# Patient Record
Sex: FEMALE | Race: WHITE | Employment: OTHER | URBAN - METROPOLITAN AREA
[De-identification: names, ages, dates, MRNs, and addresses within clinical notes are randomized per-mention and may not be internally consistent; named-entity substitution may affect disease eponyms.]

---

## 2017-01-10 ENCOUNTER — OFFICE VISIT (OUTPATIENT)
Dept: INTERNAL MEDICINE CLINIC | Age: 57
End: 2017-01-10

## 2017-01-10 VITALS
HEART RATE: 72 BPM | TEMPERATURE: 97.9 F | SYSTOLIC BLOOD PRESSURE: 132 MMHG | WEIGHT: 168 LBS | OXYGEN SATURATION: 98 % | BODY MASS INDEX: 25.46 KG/M2 | HEIGHT: 68 IN | DIASTOLIC BLOOD PRESSURE: 82 MMHG

## 2017-01-10 DIAGNOSIS — I10 ESSENTIAL HYPERTENSION, BENIGN: ICD-10-CM

## 2017-01-10 DIAGNOSIS — J02.9 PHARYNGITIS, UNSPECIFIED ETIOLOGY: Primary | ICD-10-CM

## 2017-01-10 DIAGNOSIS — J01.10 ACUTE NON-RECURRENT FRONTAL SINUSITIS: ICD-10-CM

## 2017-01-10 PROCEDURE — 87880 STREP A ASSAY W/OPTIC: CPT | Performed by: NURSE PRACTITIONER

## 2017-01-10 PROCEDURE — 99214 OFFICE O/P EST MOD 30 MIN: CPT | Performed by: NURSE PRACTITIONER

## 2017-01-10 RX ORDER — ESCITALOPRAM OXALATE 10 MG/1
10 TABLET ORAL DAILY
COMMUNITY
End: 2017-09-22

## 2017-01-10 RX ORDER — AMOXICILLIN AND CLAVULANATE POTASSIUM 875; 125 MG/1; MG/1
1 TABLET, FILM COATED ORAL 2 TIMES DAILY
Qty: 20 TABLET | Refills: 0 | Status: SHIPPED | OUTPATIENT
Start: 2017-01-10 | End: 2017-01-20

## 2017-01-10 ASSESSMENT — ENCOUNTER SYMPTOMS
SWOLLEN GLANDS: 1
NAUSEA: 1
SORE THROAT: 1
VOMITING: 0
COUGH: 1

## 2017-01-12 ENCOUNTER — OFFICE VISIT (OUTPATIENT)
Dept: PSYCHOLOGY | Age: 57
End: 2017-01-12

## 2017-01-12 DIAGNOSIS — F32.9 MAJOR DEPRESSIVE DISORDER, SINGLE EPISODE WITH ANXIOUS DISTRESS: Primary | ICD-10-CM

## 2017-01-12 DIAGNOSIS — F41.9 ANXIETY: ICD-10-CM

## 2017-01-12 DIAGNOSIS — F51.01 PRIMARY INSOMNIA: ICD-10-CM

## 2017-01-12 PROCEDURE — 90832 PSYTX W PT 30 MINUTES: CPT | Performed by: PSYCHOLOGIST

## 2017-01-12 ASSESSMENT — PATIENT HEALTH QUESTIONNAIRE - PHQ9
7. TROUBLE CONCENTRATING ON THINGS, SUCH AS READING THE NEWSPAPER OR WATCHING TELEVISION: 2
SUM OF ALL RESPONSES TO PHQ9 QUESTIONS 1 & 2: 2
3. TROUBLE FALLING OR STAYING ASLEEP: 3
1. LITTLE INTEREST OR PLEASURE IN DOING THINGS: 1
4. FEELING TIRED OR HAVING LITTLE ENERGY: 1
5. POOR APPETITE OR OVEREATING: 0
8. MOVING OR SPEAKING SO SLOWLY THAT OTHER PEOPLE COULD HAVE NOTICED. OR THE OPPOSITE, BEING SO FIGETY OR RESTLESS THAT YOU HAVE BEEN MOVING AROUND A LOT MORE THAN USUAL: 1
6. FEELING BAD ABOUT YOURSELF - OR THAT YOU ARE A FAILURE OR HAVE LET YOURSELF OR YOUR FAMILY DOWN: 2
SUM OF ALL RESPONSES TO PHQ QUESTIONS 1-9: 11
2. FEELING DOWN, DEPRESSED OR HOPELESS: 1
9. THOUGHTS THAT YOU WOULD BE BETTER OFF DEAD, OR OF HURTING YOURSELF: 0
10. IF YOU CHECKED OFF ANY PROBLEMS, HOW DIFFICULT HAVE THESE PROBLEMS MADE IT FOR YOU TO DO YOUR WORK, TAKE CARE OF THINGS AT HOME, OR GET ALONG WITH OTHER PEOPLE: 2

## 2017-01-13 DIAGNOSIS — F41.9 ANXIETY: ICD-10-CM

## 2017-01-13 DIAGNOSIS — F51.01 PRIMARY INSOMNIA: ICD-10-CM

## 2017-01-13 RX ORDER — ALPRAZOLAM 0.5 MG/1
0.5 TABLET ORAL 3 TIMES DAILY PRN
Qty: 60 TABLET | Refills: 2 | Status: CANCELLED | OUTPATIENT
Start: 2017-01-13

## 2017-01-17 RX ORDER — ALPRAZOLAM 0.5 MG/1
0.5 TABLET ORAL 3 TIMES DAILY PRN
Qty: 60 TABLET | Refills: 2 | Status: SHIPPED | OUTPATIENT
Start: 2017-01-17 | End: 2017-09-22 | Stop reason: SDUPTHER

## 2017-05-30 ENCOUNTER — TELEPHONE (OUTPATIENT)
Dept: FAMILY MEDICINE CLINIC | Age: 57
End: 2017-05-30

## 2017-07-07 RX ORDER — LISINOPRIL 10 MG/1
TABLET ORAL
Qty: 90 TABLET | Refills: 0 | Status: SHIPPED | OUTPATIENT
Start: 2017-07-07 | End: 2017-09-22

## 2017-09-22 ENCOUNTER — OFFICE VISIT (OUTPATIENT)
Dept: FAMILY MEDICINE CLINIC | Age: 57
End: 2017-09-22

## 2017-09-22 VITALS
BODY MASS INDEX: 25.42 KG/M2 | DIASTOLIC BLOOD PRESSURE: 82 MMHG | HEART RATE: 83 BPM | RESPIRATION RATE: 12 BRPM | TEMPERATURE: 97.7 F | SYSTOLIC BLOOD PRESSURE: 132 MMHG | WEIGHT: 167.2 LBS

## 2017-09-22 DIAGNOSIS — H92.02 OTALGIA, LEFT: ICD-10-CM

## 2017-09-22 DIAGNOSIS — F41.9 ANXIETY: ICD-10-CM

## 2017-09-22 DIAGNOSIS — Z12.11 SCREEN FOR COLON CANCER: ICD-10-CM

## 2017-09-22 DIAGNOSIS — F51.01 PRIMARY INSOMNIA: ICD-10-CM

## 2017-09-22 DIAGNOSIS — M26.69 TMJ INFLAMMATION: ICD-10-CM

## 2017-09-22 DIAGNOSIS — M25.562 CHRONIC PAIN OF LEFT KNEE: Primary | ICD-10-CM

## 2017-09-22 DIAGNOSIS — N95.1 HOT FLASHES DUE TO MENOPAUSE: ICD-10-CM

## 2017-09-22 DIAGNOSIS — I10 ESSENTIAL HYPERTENSION, BENIGN: ICD-10-CM

## 2017-09-22 DIAGNOSIS — G89.29 CHRONIC PAIN OF LEFT KNEE: Primary | ICD-10-CM

## 2017-09-22 PROCEDURE — 1036F TOBACCO NON-USER: CPT | Performed by: FAMILY MEDICINE

## 2017-09-22 PROCEDURE — 90471 IMMUNIZATION ADMIN: CPT | Performed by: FAMILY MEDICINE

## 2017-09-22 PROCEDURE — 3017F COLORECTAL CA SCREEN DOC REV: CPT | Performed by: FAMILY MEDICINE

## 2017-09-22 PROCEDURE — 90686 IIV4 VACC NO PRSV 0.5 ML IM: CPT | Performed by: FAMILY MEDICINE

## 2017-09-22 PROCEDURE — 3014F SCREEN MAMMO DOC REV: CPT | Performed by: FAMILY MEDICINE

## 2017-09-22 PROCEDURE — G8427 DOCREV CUR MEDS BY ELIG CLIN: HCPCS | Performed by: FAMILY MEDICINE

## 2017-09-22 PROCEDURE — 99214 OFFICE O/P EST MOD 30 MIN: CPT | Performed by: FAMILY MEDICINE

## 2017-09-22 PROCEDURE — G8419 CALC BMI OUT NRM PARAM NOF/U: HCPCS | Performed by: FAMILY MEDICINE

## 2017-09-22 RX ORDER — NAPROXEN 500 MG/1
500 TABLET ORAL 2 TIMES DAILY WITH MEALS
Qty: 60 TABLET | Refills: 5 | Status: SHIPPED | OUTPATIENT
Start: 2017-09-22 | End: 2018-07-09

## 2017-09-22 RX ORDER — ALPRAZOLAM 0.5 MG/1
0.5 TABLET ORAL 3 TIMES DAILY PRN
Qty: 60 TABLET | Refills: 2 | Status: SHIPPED | OUTPATIENT
Start: 2017-09-22 | End: 2018-07-09 | Stop reason: SDUPTHER

## 2017-11-16 DIAGNOSIS — Z00.00 ROUTINE GENERAL MEDICAL EXAMINATION AT A HEALTH CARE FACILITY: Primary | ICD-10-CM

## 2017-11-16 NOTE — TELEPHONE ENCOUNTER
Pt stated prempro about 4 weeks ago and it doesn't seem to have helped. C/o having itchy legs, nausea, lack of energy,  And didn't help anxiety either. Pt has a friend that uses climara patch and want to know your thoughts about that.

## 2017-11-16 NOTE — TELEPHONE ENCOUNTER
Pt also has appt in dec.  And asking for screening labs and if you think it's a good idea to check any hormone levels, iron studies bc family hx of hemochromatosis, and vit d

## 2017-11-21 NOTE — TELEPHONE ENCOUNTER
We had issues with her insurance and coverage of bloodwork in the past  Please verify what she wants to have done so we can order correctly  thanks

## 2017-12-19 ENCOUNTER — OFFICE VISIT (OUTPATIENT)
Dept: FAMILY MEDICINE CLINIC | Age: 57
End: 2017-12-19

## 2017-12-19 VITALS
DIASTOLIC BLOOD PRESSURE: 84 MMHG | HEIGHT: 68 IN | HEART RATE: 70 BPM | OXYGEN SATURATION: 97 % | RESPIRATION RATE: 14 BRPM | BODY MASS INDEX: 26.01 KG/M2 | SYSTOLIC BLOOD PRESSURE: 132 MMHG | WEIGHT: 171.6 LBS | TEMPERATURE: 97.4 F

## 2017-12-19 DIAGNOSIS — Z01.419 WELL WOMAN EXAM: Primary | ICD-10-CM

## 2017-12-19 DIAGNOSIS — I10 ESSENTIAL HYPERTENSION, BENIGN: ICD-10-CM

## 2017-12-19 DIAGNOSIS — R73.9 HYPERGLYCEMIA: ICD-10-CM

## 2017-12-19 DIAGNOSIS — F41.8 DEPRESSION WITH ANXIETY: ICD-10-CM

## 2017-12-19 PROCEDURE — 99396 PREV VISIT EST AGE 40-64: CPT | Performed by: FAMILY MEDICINE

## 2017-12-19 NOTE — TELEPHONE ENCOUNTER
Pt got labs done and she no showed her physical for 12/12/17 but rescheduled to come in for an appointment today 12/19/17

## 2017-12-19 NOTE — PROGRESS NOTES
benign  blood pressure stable @ goal, controlled    4. Depression with anxiety  Mild increased sx d/t stressors  Work on behavioral mgt as we discussed and per dr. Mo Rodríguez  Consider meds for increased sx, ? pristiq to give improvement in energy and low risk of sexual side effects or weight gain           Follow-up appointment:   Prn/1 year    Discussed use, benefit, and side effects of all prescribed medications. Barriers to medication compliance addressed. All patient questions answered. Pt voiced understanding. When applicable, patient's outside records were reviewed through [facility]. The patient has signed appropriate paperworks/consents. Dragon dictation software was used for parts of this progress note. All attempts were made to correct any errors and ensure accuracy.

## 2018-01-10 ENCOUNTER — TELEPHONE (OUTPATIENT)
Dept: FAMILY MEDICINE CLINIC | Age: 58
End: 2018-01-10

## 2018-01-11 NOTE — TELEPHONE ENCOUNTER
Please advise    Pt insurance wont cover tsh and vitamin d under routine. Claim has to be resubmitted with new diagnose code.  Thanks

## 2018-02-06 ENCOUNTER — OFFICE VISIT (OUTPATIENT)
Dept: FAMILY MEDICINE CLINIC | Age: 58
End: 2018-02-06

## 2018-02-06 VITALS
BODY MASS INDEX: 25.85 KG/M2 | HEART RATE: 73 BPM | RESPIRATION RATE: 20 BRPM | SYSTOLIC BLOOD PRESSURE: 134 MMHG | TEMPERATURE: 97.3 F | WEIGHT: 170 LBS | OXYGEN SATURATION: 98 % | DIASTOLIC BLOOD PRESSURE: 64 MMHG

## 2018-02-06 DIAGNOSIS — N39.0 URINARY TRACT INFECTION WITH HEMATURIA, SITE UNSPECIFIED: Primary | ICD-10-CM

## 2018-02-06 DIAGNOSIS — R31.9 URINARY TRACT INFECTION WITH HEMATURIA, SITE UNSPECIFIED: Primary | ICD-10-CM

## 2018-02-06 LAB
BILIRUBIN, POC: NORMAL
BLOOD URINE, POC: NORMAL
CLARITY, POC: NORMAL
COLOR, POC: YELLOW
GLUCOSE URINE, POC: NORMAL
KETONES, POC: NORMAL
LEUKOCYTE EST, POC: NORMAL
NITRITE, POC: NORMAL
PH, POC: 6
PROTEIN, POC: NORMAL
SPECIFIC GRAVITY, POC: 1.03
UROBILINOGEN, POC: 0.2

## 2018-02-06 PROCEDURE — G8427 DOCREV CUR MEDS BY ELIG CLIN: HCPCS | Performed by: FAMILY MEDICINE

## 2018-02-06 PROCEDURE — 3017F COLORECTAL CA SCREEN DOC REV: CPT | Performed by: FAMILY MEDICINE

## 2018-02-06 PROCEDURE — 3014F SCREEN MAMMO DOC REV: CPT | Performed by: FAMILY MEDICINE

## 2018-02-06 PROCEDURE — 99214 OFFICE O/P EST MOD 30 MIN: CPT | Performed by: FAMILY MEDICINE

## 2018-02-06 PROCEDURE — G8419 CALC BMI OUT NRM PARAM NOF/U: HCPCS | Performed by: FAMILY MEDICINE

## 2018-02-06 PROCEDURE — 81002 URINALYSIS NONAUTO W/O SCOPE: CPT | Performed by: FAMILY MEDICINE

## 2018-02-06 PROCEDURE — G8484 FLU IMMUNIZE NO ADMIN: HCPCS | Performed by: FAMILY MEDICINE

## 2018-02-06 PROCEDURE — 1036F TOBACCO NON-USER: CPT | Performed by: FAMILY MEDICINE

## 2018-02-06 RX ORDER — M-VIT,TX,IRON,MINS/CALC/FOLIC 27MG-0.4MG
1 TABLET ORAL DAILY
COMMUNITY

## 2018-02-06 RX ORDER — MULTIVIT WITH MINERALS/LUTEIN
1000 TABLET ORAL DAILY
COMMUNITY
End: 2018-07-09

## 2018-02-06 RX ORDER — CIPROFLOXACIN 500 MG/1
500 TABLET, FILM COATED ORAL 2 TIMES DAILY
Qty: 10 TABLET | Refills: 0 | Status: SHIPPED | OUTPATIENT
Start: 2018-02-06 | End: 2018-02-11

## 2018-02-06 NOTE — PROGRESS NOTES
Patient is here for urinary frequency and some dysuria. X 3 days. She started her 's left over Amoxil 875 mg 4 doses and it has helped. Still with some urinary frequency. Pain Miguel Gomez has improved. Slight specks on toilet paper . No blood for sure noticed. Darker urine . Last UTI was years ago. Last sexually active Friday. No abdominal or back pain. ROS: All other systems were reviewed and are negative . Patient's allergies and medications were reviewed. Patient's past medical, surgical, social , and family history were reviewed. Results for POC orders placed in visit on 02/06/18   POCT Urinalysis no Micro   Result Value Ref Range    Color, UA yellow     Clarity, UA hazy     Glucose, UA POC neg     Bilirubin, UA neg     Ketones, UA neg     Spec Grav, UA 1.030     Blood, UA POC non-hemolyzed trace     pH, UA 6.0     Protein, UA POC neg     Urobilinogen, UA 0.2     Leukocytes, UA trace     Nitrite, UA neg       OBJECTIVE:  /64   Pulse 73   Temp 97.3 °F (36.3 °C) (Oral)   Resp 20   Wt 170 lb (77.1 kg)   SpO2 98%   Breastfeeding? No   BMI 25.85 kg/m²   General: NAD, cooperative, alert and oriented X 3. Mood / affect is good. good insight. well hydrated. Neck : no lymphadenopathy, supple, FROM  CV: Regular rate and rhythm , no murmurs/ rub/ gallop. No edema. Lungs : CTA bilaterally, breathing comfortably  Abdomen: positive bowel sounds, soft , non tender, non distended. No hepatosplenomegaly. No CVA tenderness. Skin: no rashes. Non tender. ASSESSMENT/  PLAN:  Demetris Fields was seen today for urinary tract infection. Diagnoses and all orders for this visit:    Urinary tract infection with hematuria, site unspecified  -     POCT Urinalysis no Micro  -     Urine Culture  -     Likely given already 4 doses of Amoxil have been taken , urine culture will likely be negative . Finish additional Amoxil.    -     If no improvement in 3-4 days. Start Ciprofloxacin (CIPRO) 500 MG tablet;  Take 1

## 2018-02-08 LAB — URINE CULTURE, ROUTINE: NORMAL

## 2018-04-10 ENCOUNTER — OFFICE VISIT (OUTPATIENT)
Dept: FAMILY MEDICINE CLINIC | Age: 58
End: 2018-04-10

## 2018-04-10 VITALS
RESPIRATION RATE: 12 BRPM | SYSTOLIC BLOOD PRESSURE: 135 MMHG | OXYGEN SATURATION: 96 % | BODY MASS INDEX: 25.39 KG/M2 | DIASTOLIC BLOOD PRESSURE: 77 MMHG | TEMPERATURE: 94.9 F | WEIGHT: 167 LBS | HEART RATE: 82 BPM

## 2018-04-10 DIAGNOSIS — K52.9 AGE (ACUTE GASTROENTERITIS): Primary | ICD-10-CM

## 2018-04-10 DIAGNOSIS — J02.9 ACUTE PHARYNGITIS, UNSPECIFIED ETIOLOGY: ICD-10-CM

## 2018-04-10 DIAGNOSIS — Z71.84 COUNSELING FOR TRAVEL: ICD-10-CM

## 2018-04-10 DIAGNOSIS — R05.9 COUGH: ICD-10-CM

## 2018-04-10 DIAGNOSIS — R21 SKIN RASH: ICD-10-CM

## 2018-04-10 DIAGNOSIS — R19.7 DIARRHEA OF PRESUMED INFECTIOUS ORIGIN: ICD-10-CM

## 2018-04-10 PROCEDURE — G8419 CALC BMI OUT NRM PARAM NOF/U: HCPCS | Performed by: FAMILY MEDICINE

## 2018-04-10 PROCEDURE — 99214 OFFICE O/P EST MOD 30 MIN: CPT | Performed by: FAMILY MEDICINE

## 2018-04-10 PROCEDURE — 1036F TOBACCO NON-USER: CPT | Performed by: FAMILY MEDICINE

## 2018-04-10 PROCEDURE — 3014F SCREEN MAMMO DOC REV: CPT | Performed by: FAMILY MEDICINE

## 2018-04-10 PROCEDURE — 3017F COLORECTAL CA SCREEN DOC REV: CPT | Performed by: FAMILY MEDICINE

## 2018-04-10 PROCEDURE — G8427 DOCREV CUR MEDS BY ELIG CLIN: HCPCS | Performed by: FAMILY MEDICINE

## 2018-04-10 RX ORDER — CIPROFLOXACIN 500 MG/1
500 TABLET, FILM COATED ORAL 2 TIMES DAILY
Qty: 20 TABLET | Refills: 0 | Status: SHIPPED | OUTPATIENT
Start: 2018-04-10 | End: 2018-04-20

## 2018-04-19 ENCOUNTER — TELEPHONE (OUTPATIENT)
Dept: FAMILY MEDICINE CLINIC | Age: 58
End: 2018-04-19

## 2018-06-29 DIAGNOSIS — F51.01 PRIMARY INSOMNIA: ICD-10-CM

## 2018-06-29 DIAGNOSIS — F41.9 ANXIETY: ICD-10-CM

## 2018-06-29 NOTE — TELEPHONE ENCOUNTER
Refill is being requested for:  --ALPRAZolam [place]) 0.5 MG tablet     Last OV with PCP on:  --4/10/18    Last Labs on:  --12/9/17    Requested Pharmacy:  --[facility] [address] [facility] [phone] - [phone]    Does PCP feel refill is appropriate? Thank you!

## 2018-06-30 RX ORDER — ALPRAZOLAM 0.5 MG/1
0.5 TABLET ORAL 3 TIMES DAILY PRN
Qty: 60 TABLET | Refills: 2 | OUTPATIENT
Start: 2018-06-30

## 2018-07-02 NOTE — TELEPHONE ENCOUNTER
Pt's Xanax denied due to needing appt. Patient advised. She either wants to  a prescription or try to schedule soham today or early next for rf . No appts available. Please advise.

## 2018-07-09 ENCOUNTER — OFFICE VISIT (OUTPATIENT)
Dept: FAMILY MEDICINE CLINIC | Age: 58
End: 2018-07-09

## 2018-07-09 VITALS
HEART RATE: 68 BPM | SYSTOLIC BLOOD PRESSURE: 142 MMHG | OXYGEN SATURATION: 99 % | TEMPERATURE: 97.3 F | RESPIRATION RATE: 12 BRPM | BODY MASS INDEX: 24.72 KG/M2 | WEIGHT: 162.6 LBS | DIASTOLIC BLOOD PRESSURE: 92 MMHG

## 2018-07-09 DIAGNOSIS — F41.9 ANXIETY: ICD-10-CM

## 2018-07-09 DIAGNOSIS — I10 ESSENTIAL HYPERTENSION, BENIGN: Primary | ICD-10-CM

## 2018-07-09 DIAGNOSIS — Z12.11 SCREEN FOR COLON CANCER: ICD-10-CM

## 2018-07-09 DIAGNOSIS — F51.01 PRIMARY INSOMNIA: ICD-10-CM

## 2018-07-09 PROCEDURE — G8427 DOCREV CUR MEDS BY ELIG CLIN: HCPCS | Performed by: FAMILY MEDICINE

## 2018-07-09 PROCEDURE — 3017F COLORECTAL CA SCREEN DOC REV: CPT | Performed by: FAMILY MEDICINE

## 2018-07-09 PROCEDURE — 99214 OFFICE O/P EST MOD 30 MIN: CPT | Performed by: FAMILY MEDICINE

## 2018-07-09 PROCEDURE — G8420 CALC BMI NORM PARAMETERS: HCPCS | Performed by: FAMILY MEDICINE

## 2018-07-09 PROCEDURE — 1036F TOBACCO NON-USER: CPT | Performed by: FAMILY MEDICINE

## 2018-07-09 RX ORDER — ALPRAZOLAM 0.5 MG/1
0.5 TABLET ORAL 3 TIMES DAILY PRN
Qty: 90 TABLET | Refills: 2 | Status: SHIPPED | OUTPATIENT
Start: 2018-07-09 | End: 2019-05-29 | Stop reason: SDUPTHER

## 2018-07-09 ASSESSMENT — PATIENT HEALTH QUESTIONNAIRE - PHQ9
2. FEELING DOWN, DEPRESSED OR HOPELESS: 0
1. LITTLE INTEREST OR PLEASURE IN DOING THINGS: 0
SUM OF ALL RESPONSES TO PHQ QUESTIONS 1-9: 0
SUM OF ALL RESPONSES TO PHQ9 QUESTIONS 1 & 2: 0

## 2018-07-09 NOTE — PROGRESS NOTES
Vitamins-Minerals (THERAPEUTIC MULTIVITAMIN-MINERALS) tablet Take 1 tablet by mouth daily      lisinopril (PRINIVIL;ZESTRIL) 10 MG tablet TAKE 1 TABLET BY MOUTH EVERY DAY 90 tablet 3    Vitamin D (CHOLECALCIFEROL) 1000 UNITS CAPS capsule Take 1,000 Units by mouth daily. No current facility-administered medications for this visit. ASSESSMENT / PLAN:    1. Primary insomnia  Stable w/ xanax qhs prn  refills given as below  See CSM  Pt aware of need for every 3 month medication followup appointments, and that medication refills for benzodiazepines, narcotics and/or stimulants will only be given at appointment. - ALPRAZolam (XANAX) 0.5 MG tablet; Take 1 tablet by mouth 3 times daily as needed for Sleep or Anxiety for up to 30 days. .  Dispense: 90 tablet; Refill: 2    2. Anxiety  Stable despite increased stressors  No indication for additional therapy at this time, consider counseling or SSRI therapy for increased sx  No suicidal thoughts or ideation  See CSM  - ALPRAZolam (XANAX) 0.5 MG tablet; Take 1 tablet by mouth 3 times daily as needed for Sleep or Anxiety for up to 30 days. .  Dispense: 90 tablet; Refill: 2    3. Essential hypertension, benign  Elevated, recheck improved but still high  Discussed tx options. Pt feels high due to stressors, poor diet, increase in sodium intake  Will monitor with diet/exercise and recheck blood pressure 3 months  Management pending results. Consider increase dose of lisinopril    4. Screen for colon cancer  Working to set up colonoscopy  Aware risk of not having done           Follow-up appointment:   3 mos/prn    Discussed use, benefit, and side effects of all prescribed medications. Barriers to medication compliance addressed. All patient questions answered. Pt voiced understanding. When applicable, patient's outside records were reviewed through [facility]. The patient has signed appropriate paperworks/consents.     Dragon dictation software was used for parts of this progress note. All attempts were made to correct any errors and ensure accuracy.

## 2019-01-15 ENCOUNTER — TELEPHONE (OUTPATIENT)
Dept: FAMILY MEDICINE CLINIC | Age: 59
End: 2019-01-15

## 2019-01-15 DIAGNOSIS — N63.0 BREAST NODULE: Primary | ICD-10-CM

## 2019-03-11 ENCOUNTER — TELEPHONE (OUTPATIENT)
Dept: FAMILY MEDICINE CLINIC | Age: 59
End: 2019-03-11

## 2019-03-11 DIAGNOSIS — Z00.00 ROUTINE GENERAL MEDICAL EXAMINATION AT A HEALTH CARE FACILITY: Primary | ICD-10-CM

## 2019-03-11 DIAGNOSIS — R73.9 HYPERGLYCEMIA: ICD-10-CM

## 2019-05-29 ENCOUNTER — OFFICE VISIT (OUTPATIENT)
Dept: FAMILY MEDICINE CLINIC | Age: 59
End: 2019-05-29
Payer: COMMERCIAL

## 2019-05-29 VITALS
WEIGHT: 162 LBS | TEMPERATURE: 97.9 F | OXYGEN SATURATION: 98 % | HEIGHT: 68 IN | BODY MASS INDEX: 24.55 KG/M2 | DIASTOLIC BLOOD PRESSURE: 81 MMHG | SYSTOLIC BLOOD PRESSURE: 136 MMHG | HEART RATE: 54 BPM

## 2019-05-29 DIAGNOSIS — G89.29 CHRONIC PAIN OF LEFT KNEE: ICD-10-CM

## 2019-05-29 DIAGNOSIS — F51.01 PRIMARY INSOMNIA: ICD-10-CM

## 2019-05-29 DIAGNOSIS — F41.9 ANXIETY: ICD-10-CM

## 2019-05-29 DIAGNOSIS — M25.562 CHRONIC PAIN OF LEFT KNEE: ICD-10-CM

## 2019-05-29 DIAGNOSIS — Z01.419 WELL WOMAN EXAM: Primary | ICD-10-CM

## 2019-05-29 DIAGNOSIS — I83.813 VARICOSE VEINS OF BOTH LOWER EXTREMITIES WITH PAIN: ICD-10-CM

## 2019-05-29 DIAGNOSIS — Z83.49 FAMILY HISTORY OF HEMOCHROMATOSIS: ICD-10-CM

## 2019-05-29 DIAGNOSIS — I10 ESSENTIAL HYPERTENSION, BENIGN: ICD-10-CM

## 2019-05-29 DIAGNOSIS — Z12.11 SCREEN FOR COLON CANCER: ICD-10-CM

## 2019-05-29 DIAGNOSIS — R63.5 WEIGHT GAIN: ICD-10-CM

## 2019-05-29 PROCEDURE — 99396 PREV VISIT EST AGE 40-64: CPT | Performed by: FAMILY MEDICINE

## 2019-05-29 RX ORDER — LISINOPRIL 10 MG/1
TABLET ORAL
Qty: 90 TABLET | Refills: 3 | Status: SHIPPED | OUTPATIENT
Start: 2019-05-29 | End: 2020-05-07 | Stop reason: SDUPTHER

## 2019-05-29 RX ORDER — ALPRAZOLAM 0.5 MG/1
0.5 TABLET ORAL 3 TIMES DAILY PRN
Qty: 90 TABLET | Refills: 2 | Status: SHIPPED | OUTPATIENT
Start: 2019-05-29 | End: 2020-05-12 | Stop reason: SDUPTHER

## 2019-05-29 NOTE — PROGRESS NOTES
Here for well checkup, physical.  Pt states that things are doing well. Pt does have some chronic issues with leg and knee pain. Pt did try and strengthen knee with body bar and stretching but sx have continued. Pt denies any injury or trauma. When she is more active, does find sx are worse. No instability. Pt does try and stay active with stretching, and goes to gym regularly but with persistent sx. Pt has not had any recent imaging. Pt had imaging several years ago. No instability but mostly discomfort. Pt will be going to [facility] for 3 weeks and wants to get it strengthened up. Pt is not treating varicose veings, but is noting sx moderately and with bulging veins and discomfort. Pt with chroinc sx. Except as noted in the history of present illness as above, the review of systems is negative for the following:    General ROS: negative  Psychological ROS: negative  Allergy and Immunology ROS: negative  Hematological and Lymphatic ROS: negative  Respiratory ROS: no cough, shortness of breath, or wheezing  Cardiovascular ROS: no chest pain or dyspnea on exertion  Gastrointestinal ROS: no abdominal pain, change in bowel habits, or black or bloody stools  Genito-Urinary ROS: no dysuria, trouble voiding, or hematuria  Musculoskeletal ROS: negative  Dermatological ROS: negative      Past medical, surgical, and social history reviewed and updated. Medications and allergies reviewed and updated        /81   Pulse 54   Temp 97.9 °F (36.6 °C) (Oral)   Ht 5' 8\" (1.727 m)   Wt 162 lb (73.5 kg)   SpO2 98%   Breastfeeding? No   BMI 24.63 kg/m²   General appearance - healthy, alert, no distress  Skin - Skin color, texture, turgor normal. No rashes or lesions. No suspicious findings  Head - Normocephalic. No masses, lesions, tenderness or abnormalities  Eyes - conjunctivae/corneas clear. Pupils equal and reactive to light and accomodation, extraocular muscles intact.   Ears - External ears normal. Canals clear. Tympanic membranes normal bilaterally. Nose/Sinuses - Nares normal. Septum midline. Mucosa normal. No drainage or sinus tenderness. Oropharynx - Lips, mucosa, and tongue normal. Teeth and gums normal.   Neck - Neck supple. No adenopathy. Thyroid symmetric, normal size, no carotid bruit bilaterally  Back - Back symmetric, no curvature. Range of motion normal. No Costovertebral angle tenderness. Lungs - Percussion normal. Good diaphragmatic excursion. Lungs clear without wheeze, rales, crackles  Heart - Regular rate and rhythm, with no rub, murmur or gallop noted. Abdomen - Abdomen soft, non-tender. Bowel sounds normal. No masses, tenderness or organomegaly  Breasts: breasts appear normal, no suspicious masses, no skin or nipple changes or axillary nodes. Self exam is encouraged. Pelvis: normal external genitalia, vulva, vagina, cervix, uterus and adnexa, PAP: Pap smear done today, thin-prep method HPV co-testing. Extremities - Extremities normal. No deformities, edema, or skin discoloration  Musculoskeletal - Spine ROM normal. Muscular strength intact. Peripheral pulses - radial=4/4,, femoral=4/4, popliteal=4/4, dorsalis pedis=4/4,  Neuro - Gait normal. Reflexes normal and symmetric. Sensation grossly normal.  No focal weakness  Psych - euthymic, no suicidal thoughts or ideation, mood stable. Exam chaperoned by female assistant. ASSESSMENT / PLAN:    1. Well woman exam  No focal abnormalities on exam  Anticipatory guidance discussed. Check bloodwork as below  Check pap/pelvic/breast exam today  - CBC; Future  - Comprehensive Metabolic Panel; Future  - Lipid Panel; Future  - TSH without Reflex; Future  - PAP SMEAR    2.  Primary insomnia  Stable w/o flare  Cont prn xanax, use intermittent  See CSM  Pt aware of need for every 3 month medication followup appointments, and that medication refills for benzodiazepines, narcotics and/or stimulants will only be given at appointment. - ALPRAZolam (XANAX) 0.5 MG tablet; Take 1 tablet by mouth 3 times daily as needed for Sleep or Anxiety for up to 30 days. Dispense: 90 tablet; Refill: 2    3. Anxiety  Stable w/o flare  Cont prn xanax  Use intermittent  See CSM  - ALPRAZolam (XANAX) 0.5 MG tablet; Take 1 tablet by mouth 3 times daily as needed for Sleep or Anxiety for up to 30 days. Dispense: 90 tablet; Refill: 2    4. Weight gain  Check bloodwork for:  - TSH without Reflex; Future  - T4, Free; Future    5. Varicose veins of both lower extremities with pain  Refer Dr. Radha Snowden for eval  - Phi Emanuel MD, Vascular Surgery, [facility]    6. Essential hypertension, benign  blood pressure stable @ goal, controlled    7. Family history of hemochromatosis  Check ferritin level  - Ferritin; Future    8. Screen for colon cancer  Due colonoscopy  Refer GI For eval  - [facility] - Gary Henry MD, Gastroenterology, [facility]    9. Chronic pain of left knee  Exam nonfocal, suspect osteoarthritis issues  tx with range of motion exercsies, NSAIDs and referral to physical therapy   - External Referral To Physical Therapy           Follow-up appointment:   1 year/prn    Discussed use, benefit, and side effects of all prescribed medications. Barriers to medication compliance addressed. All patient questions answered. Pt voiced understanding. When applicable, patient's outside records were reviewed through [address] [place] [address] Loop. The patient has signed appropriate paperworks/consents.

## 2019-05-31 LAB
HPV COMMENT: NORMAL
HPV TYPE 16: NOT DETECTED
HPV TYPE 18: NOT DETECTED
HPVOH (OTHER TYPES): NOT DETECTED

## 2019-07-08 ENCOUNTER — TELEPHONE (OUTPATIENT)
Dept: FAMILY MEDICINE CLINIC | Age: 59
End: 2019-07-08

## 2019-12-20 ENCOUNTER — OFFICE VISIT (OUTPATIENT)
Dept: VASCULAR SURGERY | Age: 59
End: 2019-12-20
Payer: COMMERCIAL

## 2019-12-20 VITALS
BODY MASS INDEX: 24.25 KG/M2 | HEART RATE: 86 BPM | SYSTOLIC BLOOD PRESSURE: 124 MMHG | HEIGHT: 68 IN | WEIGHT: 160 LBS | DIASTOLIC BLOOD PRESSURE: 81 MMHG

## 2019-12-20 DIAGNOSIS — I83.893 SYMPTOMATIC VARICOSE VEINS OF BOTH LOWER EXTREMITIES: Primary | ICD-10-CM

## 2019-12-20 PROCEDURE — G8484 FLU IMMUNIZE NO ADMIN: HCPCS | Performed by: SURGERY

## 2019-12-20 PROCEDURE — G8427 DOCREV CUR MEDS BY ELIG CLIN: HCPCS | Performed by: SURGERY

## 2019-12-20 PROCEDURE — G8420 CALC BMI NORM PARAMETERS: HCPCS | Performed by: SURGERY

## 2019-12-20 PROCEDURE — 3017F COLORECTAL CA SCREEN DOC REV: CPT | Performed by: SURGERY

## 2019-12-20 PROCEDURE — 1036F TOBACCO NON-USER: CPT | Performed by: SURGERY

## 2019-12-20 PROCEDURE — 99204 OFFICE O/P NEW MOD 45 MIN: CPT | Performed by: SURGERY

## 2019-12-20 RX ORDER — ALPRAZOLAM 0.5 MG/1
TABLET ORAL
COMMUNITY
Start: 2019-11-14

## 2020-01-03 ENCOUNTER — OFFICE VISIT (OUTPATIENT)
Dept: INTERNAL MEDICINE CLINIC | Age: 60
End: 2020-01-03
Payer: COMMERCIAL

## 2020-01-03 VITALS
HEART RATE: 70 BPM | WEIGHT: 160 LBS | BODY MASS INDEX: 24.33 KG/M2 | OXYGEN SATURATION: 97 % | SYSTOLIC BLOOD PRESSURE: 122 MMHG | DIASTOLIC BLOOD PRESSURE: 78 MMHG

## 2020-01-03 PROBLEM — N30.00 ACUTE CYSTITIS WITHOUT HEMATURIA: Status: ACTIVE | Noted: 2020-01-03

## 2020-01-03 LAB
BILIRUBIN, POC: NORMAL
BLOOD URINE, POC: NORMAL
CLARITY, POC: NORMAL
COLOR, POC: NORMAL
GLUCOSE URINE, POC: NORMAL
KETONES, POC: NORMAL
LEUKOCYTE EST, POC: 70
NITRITE, POC: NORMAL
PH, POC: 5
PROTEIN, POC: NORMAL
SPECIFIC GRAVITY, POC: 1.02
UROBILINOGEN, POC: 0.2

## 2020-01-03 PROCEDURE — G8427 DOCREV CUR MEDS BY ELIG CLIN: HCPCS | Performed by: NURSE PRACTITIONER

## 2020-01-03 PROCEDURE — 3017F COLORECTAL CA SCREEN DOC REV: CPT | Performed by: NURSE PRACTITIONER

## 2020-01-03 PROCEDURE — 1036F TOBACCO NON-USER: CPT | Performed by: NURSE PRACTITIONER

## 2020-01-03 PROCEDURE — G8420 CALC BMI NORM PARAMETERS: HCPCS | Performed by: NURSE PRACTITIONER

## 2020-01-03 PROCEDURE — 81002 URINALYSIS NONAUTO W/O SCOPE: CPT | Performed by: NURSE PRACTITIONER

## 2020-01-03 PROCEDURE — G8484 FLU IMMUNIZE NO ADMIN: HCPCS | Performed by: NURSE PRACTITIONER

## 2020-01-03 PROCEDURE — 99213 OFFICE O/P EST LOW 20 MIN: CPT | Performed by: NURSE PRACTITIONER

## 2020-01-03 RX ORDER — SULFAMETHOXAZOLE AND TRIMETHOPRIM 800; 160 MG/1; MG/1
1 TABLET ORAL 2 TIMES DAILY
Qty: 14 TABLET | Refills: 0 | Status: SHIPPED | OUTPATIENT
Start: 2020-01-03 | End: 2020-01-10

## 2020-01-03 ASSESSMENT — ENCOUNTER SYMPTOMS
SINUS PRESSURE: 0
SINUS PAIN: 0
CONSTIPATION: 0
VOMITING: 0
NAUSEA: 0
COUGH: 0
COLOR CHANGE: 0
ABDOMINAL PAIN: 0
DIARRHEA: 0
WHEEZING: 0
BACK PAIN: 0
SHORTNESS OF BREATH: 0

## 2020-01-03 NOTE — PROGRESS NOTES
Subjective:      Patient ID: Mary Lindquist is a 63 y.o. female. Chief Complaint   Patient presents with    Other     burning, urine frequency, lower abdominal pain, nausea x 6 days       Dysuria    This is a new problem. The current episode started in the past 7 days. The problem occurs every urination. The problem has been gradually worsening. The quality of the pain is described as burning. There has been no fever. Associated symptoms include frequency and urgency. Pertinent negatives include no chills, flank pain, hematuria, nausea or vomiting. Treatments tried: cranberry juice. The treatment provided no relief. Review of Systems   Constitutional: Negative for chills, fatigue and fever. HENT: Negative for congestion, sinus pressure and sinus pain. Respiratory: Negative for cough, shortness of breath and wheezing. Cardiovascular: Negative for chest pain and palpitations. Gastrointestinal: Negative for abdominal pain, constipation, diarrhea, nausea and vomiting. Genitourinary: Positive for dysuria, frequency and urgency. Negative for flank pain and hematuria. Musculoskeletal: Negative for arthralgias, back pain and myalgias. Skin: Negative for color change, pallor and rash. Neurological: Negative for dizziness, syncope, weakness, light-headedness and headaches. Psychiatric/Behavioral: Negative for behavioral problems, confusion and sleep disturbance. The patient is not nervous/anxious. Objective:   Physical Exam  Constitutional:       Appearance: She is well-developed. HENT:      Head: Normocephalic and atraumatic. Eyes:      Conjunctiva/sclera: Conjunctivae normal.      Pupils: Pupils are equal, round, and reactive to light. Neck:      Musculoskeletal: Normal range of motion and neck supple. Thyroid: No thyromegaly. Vascular: No JVD. Cardiovascular:      Rate and Rhythm: Normal rate and regular rhythm. Heart sounds: Normal heart sounds.    Pulmonary:

## 2020-01-06 LAB
ORGANISM: ABNORMAL
URINE CULTURE, ROUTINE: ABNORMAL

## 2020-01-09 DIAGNOSIS — I83.12 VARICOSE VEINS OF BOTH LOWER EXTREMITIES WITH INFLAMMATION: Primary | ICD-10-CM

## 2020-01-09 DIAGNOSIS — I83.11 VARICOSE VEINS OF BOTH LOWER EXTREMITIES WITH INFLAMMATION: Primary | ICD-10-CM

## 2020-01-10 ENCOUNTER — TELEPHONE (OUTPATIENT)
Dept: VASCULAR SURGERY | Age: 60
End: 2020-01-10

## 2020-01-10 NOTE — TELEPHONE ENCOUNTER
Called lm/voicemail for patient to call office back to reschedule vascular scan. If patient calls back she needs to call [phone] to schedule an appointment at [facility].

## 2020-01-13 ENCOUNTER — TELEPHONE (OUTPATIENT)
Dept: CARDIOTHORACIC SURGERY | Age: 60
End: 2020-01-13

## 2020-01-13 NOTE — TELEPHONE ENCOUNTER
Grover Galvan would like a callback she wants to know if it is more expensive to have the scan done at [facility] and the cost to do it here at our office [phone].

## 2020-02-20 ENCOUNTER — TELEPHONE (OUTPATIENT)
Dept: VASCULAR SURGERY | Age: 60
End: 2020-02-20

## 2020-03-13 ENCOUNTER — NURSE TRIAGE (OUTPATIENT)
Dept: OTHER | Facility: CLINIC | Age: 60
End: 2020-03-13

## 2020-03-13 NOTE — TELEPHONE ENCOUNTER
Pt was triaged to be seen within the next 3 days but the pt wanted to send a message due to Coronavirus and that she needs to care for her parents. Pt has dry cough and nurse suggested that it was caused by a medication. lisinopril (PRINIVIL;ZESTRIL) 10 MG tablet    Please call pt as soon as possible.

## 2020-03-13 NOTE — TELEPHONE ENCOUNTER
Reason for Disposition   Taking an ACE Inhibitor medication (e.g., benazepril/LOTENSIN, captopril/CAPOTEN, enalapril/VASOTEC, lisinopril/ZESTRIL)    Answer Assessment - Initial Assessment Questions  1. ONSET: \"When did the cough begin? \"       Tuesday of last week     2. SEVERITY: \"How bad is the cough today? \"        Productive cough with light green mucus    3. RESPIRATORY DISTRESS: \"Describe your breathing. \"       SOB    4. FEVER: \"Do you have a fever? \" If so, ask: \"What is your temperature, how was it measured, and when did it start? \"      Denies    5. HEMOPTYSIS: \"Are you coughing up any blood? \" If so ask: \"How much? \" (flecks, streaks, tablespoons, etc.)      Denies    6. TREATMENT: \"What have you done so far to treat the cough? \" (e.g., meds, fluids, humidifier)      OTC meds, fluids    7. CARDIAC HISTORY: \"Do you have any history of heart disease? \" (e.g., heart attack, congestive heart failure)       Denies    8. LUNG HISTORY: \"Do you have any history of lung disease? \"  (e.g., pulmonary embolus, asthma, emphysema)       Denies    9. PE RISK FACTORS: \"Do you have a history of blood clots? \" (or: recent major surgery, recent prolonged travel, bedridden)      Denies    10. OTHER SYMPTOMS: \"Do you have any other symptoms? (e.g., runny nose, wheezing, chest pain)        Denies    11. PREGNANCY: \"Is there any chance you are pregnant? \" \"When was your last menstrual period? \"        NA     12. TRAVEL: \"Have you traveled out of the country in the last month? \" (e.g., travel history, exposures)        Denies    Protocols used: COUGH-ADULT-[place]    Patient called [place] [facility]) to schedule appointment, with red flag complaint, transferred to RN access for triage. Pt called in with dry cough that is productive at times with greenish yellow mucus since last Tuesday. SOB, denies wheezing and fever. Pt is taking an ACE inhibitor. Caller reports symptoms as documented above.   Caller informed of disposition. Soft transfer to pre-service center to schedule appointment as recommended. Care advice as documented. Please do not respond to the triage nurse through this encounter. Any subsequent communication should be directly with the patient.

## 2020-03-14 NOTE — TELEPHONE ENCOUNTER
As long as she has no fever, chills, it is likely a virus  I would avoid exposure to anyone while she is sick  It is not likely related to the lisinopril as she has been on that long-term and has a cough for 10 days

## 2020-03-18 ENCOUNTER — NURSE TRIAGE (OUTPATIENT)
Dept: OTHER | Facility: CLINIC | Age: 60
End: 2020-03-18

## 2020-03-18 ENCOUNTER — TELEPHONE (OUTPATIENT)
Dept: FAMILY MEDICINE CLINIC | Age: 60
End: 2020-03-18

## 2020-03-18 NOTE — TELEPHONE ENCOUNTER
Patient is calling today asking if she can get checked for COVID-19. She said that she has had a dry cough x2 weeks. She has no fever. She is fatigued. 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 She was given the 888 number for the flu/COVID clinic to be triaged.

## 2020-03-18 NOTE — TELEPHONE ENCOUNTER
Reason for Disposition   [1] No COVID-19 EXPOSURE BUT [2] questions about    Protocols used: CORONAVIRUS (COVID-19) EXPOSURE-ADULT-AH    Pt calling c/o cough for 2 weeks. Cough is dry. Has some fatigue and headache at times. No fever, no know exposure. 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 Clinic locations given. Recommend treat symptoms at home, increase fluids, cough med as needed, clinic locations given.

## 2020-03-18 NOTE — TELEPHONE ENCOUNTER
Notify pt that we can't do that testing here, and there are limited resources for COVID testing  Should go through the triage number as given  As she has not had a fever at all, it is extremely unlikely that she has that infection

## 2020-03-20 ENCOUNTER — TELEPHONE (OUTPATIENT)
Dept: FAMILY MEDICINE CLINIC | Age: 60
End: 2020-03-20

## 2020-05-07 RX ORDER — LISINOPRIL 10 MG/1
TABLET ORAL
Qty: 90 TABLET | Refills: 3 | Status: SHIPPED | OUTPATIENT
Start: 2020-05-07 | End: 2020-05-12

## 2020-05-12 ENCOUNTER — VIRTUAL VISIT (OUTPATIENT)
Dept: FAMILY MEDICINE CLINIC | Age: 60
End: 2020-05-12
Payer: COMMERCIAL

## 2020-05-12 VITALS — BODY MASS INDEX: 23.42 KG/M2 | WEIGHT: 154 LBS

## 2020-05-12 PROCEDURE — 3017F COLORECTAL CA SCREEN DOC REV: CPT | Performed by: FAMILY MEDICINE

## 2020-05-12 PROCEDURE — G8427 DOCREV CUR MEDS BY ELIG CLIN: HCPCS | Performed by: FAMILY MEDICINE

## 2020-05-12 PROCEDURE — 99213 OFFICE O/P EST LOW 20 MIN: CPT | Performed by: FAMILY MEDICINE

## 2020-05-12 RX ORDER — ALPRAZOLAM 0.5 MG/1
0.5 TABLET ORAL 3 TIMES DAILY PRN
Qty: 90 TABLET | Refills: 2 | Status: SHIPPED | OUTPATIENT
Start: 2020-05-12 | End: 2020-11-25 | Stop reason: SDUPTHER

## 2020-05-12 ASSESSMENT — PATIENT HEALTH QUESTIONNAIRE - PHQ9
2. FEELING DOWN, DEPRESSED OR HOPELESS: 0
SUM OF ALL RESPONSES TO PHQ QUESTIONS 1-9: 0
SUM OF ALL RESPONSES TO PHQ9 QUESTIONS 1 & 2: 0
1. LITTLE INTEREST OR PLEASURE IN DOING THINGS: 0
SUM OF ALL RESPONSES TO PHQ QUESTIONS 1-9: 0

## 2020-05-12 NOTE — PROGRESS NOTES
Here for virtual visit and recheck of anxiety. Pt states that she is doing ok, dealing with some stress with mother's recent health issues, including recent CVA. They are getting a lot of help with her, and looking at getting a live-in help. They are now able to get ST, OT, and PT but is struggling. Pt is managing the stress fair, but does feel that she is managing accordingly with xanax. Pt did do physical therapy in the past, and can restart that if needed. Pt did see dr. Elpidio Rooney at the office. Pt here for follow up of blood pressure. Pt states doing great with adherence to therapy and feels well. No issues of chest pain, shortness of breath. No vision changes, headache, swelling in legs. Pt does have monitor and will start monitoring    2020    TELEHEALTH EVALUATION -- Audio/Visual (During SUMWX-77 public health emergency)      Due to [place] 19 outbreak, patient's office visit was converted to a virtual visit. Patient was contacted and agreed to proceed with a virtual visit via [address] Visit  The risks and benefits of converting to a virtual visit were discussed in light of the current infectious disease epidemic. Patient also understood that insurance coverage and co-pays are up to their individual insurance plans. As above    Review of Systems  Except as noted above in the history of present illness, the review of systems is  negative for headache, vision changes, chest pain, shortness of breath, abdominal pain, urinary sx, bowel changes.     Past medical, surgical, and social history reviewed and updated  Medications and allergies reviewed and updated        Social History     Tobacco Use    Smoking status: Former Smoker     Packs/day: 0.50     Years: 5.00     Pack years: 2.50     Last attempt to quit: 1998     Years since quittin.7    Smokeless tobacco: Never Used   Substance Use Topics    Alcohol use: Yes     Comment: socially, 5x per week    Drug use: Not on file Current Outpatient Medications   Medication Sig Dispense Refill    vitamin B-12 (CYANOCOBALAMIN) 1000 MCG tablet Take 1,000 mcg by mouth daily      ALPRAZolam (XANAX) 0.5 MG tablet Take 1 tablet by mouth 3 times daily as needed for Sleep or Anxiety for up to 30 days. 90 tablet 2    ALPRAZolam (XANAX) 0.5 MG tablet TK 1 T PO  TID PRN FOR SLEEP OR ANXIETY      lisinopril (PRINIVIL;ZESTRIL) 10 MG tablet TAKE 1 TABLET BY MOUTH EVERY DAY 90 tablet 1    Psyllium (METAMUCIL FIBER PO) Take by mouth      Vitamin D (CHOLECALCIFEROL) 1000 UNITS CAPS capsule Take 1,000 Units by mouth daily.  Multiple Vitamins-Minerals (THERAPEUTIC MULTIVITAMIN-MINERALS) tablet Take 1 tablet by mouth daily       No current facility-administered medications for this visit. Allergies   Allergen Reactions    Dust Mite Extract     Macrobid [Nitrofurantoin Macrocrystal] Rash        PHYSICAL EXAMINATION:    All boxes checked are findings on exam, empty boxes are negative or not evaluated during the examination  Limitation in exam due to use of video conferencing software, virtual visits    Vital Signs: (As obtained by patient/caregiver or practitioner observation)  Wt 154 lb (69.9 kg)   BMI 23.42 kg/m²      Constitutional: [x] Appears well-developed and well-nourished [x] No apparent distress      [] Abnormal-   Mental status  [x] Alert and awake  [x] Oriented to person/place/time []Able to follow commands      Eyes:  EOM    []  Normal  [] Abnormal-  Sclera  []  Normal  [] Abnormal -         Discharge []  None visible  [] Abnormal -    HENT:   [x] Normocephalic, atraumatic.   [] Abnormal   [] Mouth/Throat: Mucous membranes are moist.     External Ears [] Normal  [] Abnormal-     Neck: [x] No visualized mass     Pulmonary/Chest: [x] Respiratory effort normal.  [x] No visualized signs of difficulty breathing or respiratory distress        [] Abnormal-      Musculoskeletal:   [] Normal gait with no signs of ataxia         [] signed appropriate paperworks/consents. An  electronic signature was used to authenticate this note. --Lissa Bueno MD on 5/12/2020 at 2:41 PM      Pursuant to the emergency declaration under the [address] [place] [address], [facility] waiver authority and the [employer] Act, this Virtual  Visit was conducted, with patient's consent, to reduce the patient's risk of exposure to COVID-19 and provide continuity of care for an established patient. Services were provided through a video synchronous discussion virtually to substitute for in-person clinic visit.

## 2020-08-25 ENCOUNTER — TELEPHONE (OUTPATIENT)
Dept: FAMILY MEDICINE CLINIC | Age: 60
End: 2020-08-25

## 2020-08-25 NOTE — TELEPHONE ENCOUNTER
Pt stated that she has appointment tomorrow she will like to know if you want to do a virtual and she can show you bump over facetime Sir?  Please advise

## 2020-08-25 NOTE — TELEPHONE ENCOUNTER
Patient is calling today regarding her lower leg/shin bilaterally. More pain on the left. She has a bump that is still present on her left shin. She states she did not have any bruising and the initial bump was about the size of a golf ball. She states she has always had problems with varicose veins. She now has pain/aching, when standing for a long time all the way up into her groin. Bilateral reflex insufficiency study at Dr. Cristian Ann office 8/27 @ 8:30a    She has no leg swelling, no redness, no warmth. She is not thinking it is a blood clot. She states no pain except with prolonged standing and when she touches the area.      Please advise

## 2020-08-27 ENCOUNTER — PROCEDURE VISIT (OUTPATIENT)
Dept: VASCULAR SURGERY | Age: 60
End: 2020-08-27
Payer: COMMERCIAL

## 2020-08-27 DIAGNOSIS — I83.12 VARICOSE VEINS OF BOTH LOWER EXTREMITIES WITH INFLAMMATION: ICD-10-CM

## 2020-08-27 DIAGNOSIS — I83.893 SYMPTOMATIC VARICOSE VEINS OF BOTH LOWER EXTREMITIES: Primary | ICD-10-CM

## 2020-08-27 DIAGNOSIS — I83.11 VARICOSE VEINS OF BOTH LOWER EXTREMITIES WITH INFLAMMATION: ICD-10-CM

## 2020-08-27 PROCEDURE — 93970 EXTREMITY STUDY: CPT | Performed by: SURGERY

## 2020-10-14 ENCOUNTER — TELEPHONE (OUTPATIENT)
Dept: FAMILY MEDICINE CLINIC | Age: 60
End: 2020-10-14

## 2020-10-15 ENCOUNTER — TELEPHONE (OUTPATIENT)
Dept: FAMILY MEDICINE CLINIC | Age: 60
End: 2020-10-15

## 2020-10-15 DIAGNOSIS — Z00.00 ROUTINE GENERAL MEDICAL EXAMINATION AT A HEALTH CARE FACILITY: Primary | ICD-10-CM

## 2020-10-15 NOTE — TELEPHONE ENCOUNTER
Álvaro Bennett called stating she is having knee surgery on 11/19/2020. Her pre-op visit with Dr. Evelyne Dale is scheduled for 11/13/2020. She will be having lab work done at [facility] per her surgeon, but she would also like to know if Dr. Evelyne Dale can order her thyroid panel, iron studies, vitamin D, and anything else she would be due for. She will contact the office to provide the lab's fax number. Please advise. Thanks.           Álvaro Bennett [phone] (home)

## 2020-11-12 ENCOUNTER — TELEPHONE (OUTPATIENT)
Dept: FAMILY MEDICINE CLINIC | Age: 60
End: 2020-11-12

## 2020-11-12 NOTE — TELEPHONE ENCOUNTER
Appt change to Physical/PAP  Pt postponed surgery    ----- Message from Polly Jacome sent at 11/12/2020  1:11 PM EST -----  Subject: Message to Provider    QUESTIONS  Information for Provider? PT has pre-op physical appointment on 11/13 and   they would like to change it to a regular wellness visit. PT wants a pap   smear during wellness visit. Patient also wants to know if they should do   labs they have orders for prior to visit  ---------------------------------------------------------------------------  --------------  [address]  What is the best way for the office to contact you? OK to leave message on   voicemail   OK to respond with secure message via [facility] portal (only for patients   who have registered [facility] account)  Preferred Call Back Phone Number? [phone]  ---------------------------------------------------------------------------  --------------  SCRIPT ANSWERS  Relationship to Patient?  Self

## 2020-11-13 ENCOUNTER — OFFICE VISIT (OUTPATIENT)
Dept: FAMILY MEDICINE CLINIC | Age: 60
End: 2020-11-13
Payer: COMMERCIAL

## 2020-11-13 ENCOUNTER — OFFICE VISIT (OUTPATIENT)
Dept: VASCULAR SURGERY | Age: 60
End: 2020-11-13
Payer: COMMERCIAL

## 2020-11-13 VITALS
OXYGEN SATURATION: 97 % | HEIGHT: 68 IN | WEIGHT: 158 LBS | BODY MASS INDEX: 23.95 KG/M2 | DIASTOLIC BLOOD PRESSURE: 83 MMHG | TEMPERATURE: 97.3 F | HEART RATE: 71 BPM | SYSTOLIC BLOOD PRESSURE: 132 MMHG

## 2020-11-13 VITALS
HEIGHT: 68 IN | HEART RATE: 69 BPM | TEMPERATURE: 97.4 F | DIASTOLIC BLOOD PRESSURE: 79 MMHG | BODY MASS INDEX: 23.95 KG/M2 | SYSTOLIC BLOOD PRESSURE: 126 MMHG | WEIGHT: 158 LBS

## 2020-11-13 PROCEDURE — G8420 CALC BMI NORM PARAMETERS: HCPCS | Performed by: SURGERY

## 2020-11-13 PROCEDURE — G8427 DOCREV CUR MEDS BY ELIG CLIN: HCPCS | Performed by: SURGERY

## 2020-11-13 PROCEDURE — 99213 OFFICE O/P EST LOW 20 MIN: CPT | Performed by: SURGERY

## 2020-11-13 PROCEDURE — 1036F TOBACCO NON-USER: CPT | Performed by: SURGERY

## 2020-11-13 PROCEDURE — G8484 FLU IMMUNIZE NO ADMIN: HCPCS | Performed by: SURGERY

## 2020-11-13 PROCEDURE — G8484 FLU IMMUNIZE NO ADMIN: HCPCS | Performed by: FAMILY MEDICINE

## 2020-11-13 PROCEDURE — 3017F COLORECTAL CA SCREEN DOC REV: CPT | Performed by: SURGERY

## 2020-11-13 PROCEDURE — 99396 PREV VISIT EST AGE 40-64: CPT | Performed by: FAMILY MEDICINE

## 2020-11-13 NOTE — Clinical Note
Temi Valenzuela saw Stacia Buckner in the office today to discuss results of her recent venous reflux study. Please see my attached note for recommendations. I have asked her to consider proceeding with venous surgery either before or after her knee treatment based upon her decision and discussions with her orthopedic surgeon. If you have any questions please feel free to contact me. Thanks for asked me to see her and please let me know if I can help you with any of your other patients in the future.     Belen Lopez

## 2020-11-13 NOTE — PROGRESS NOTES
Here for well checkup, physical.  Pt states that she is doing ok but dealing with moderate stressors. Pt is trying to stay home as she can. 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 Does get groceries delivered. Pt has been doing ok to stay healthy but has not been as consistent with exercise. .  Pt was doing some work to exercise, but with some osteoarthritis issues in L knee. Pt did see ortho in the past and was dx with end-stage osteoarthritis on L knee, but wants to get a second opinion before considering surgical intervention. Pt is struggling with the COVID stress but feels she is managing    Pt was concerned that in 2/2020 she did have some URI sx and she did loss taste and smell, but never had fever. Pt did have URI sx and was concerned with COVID. Pt states since then is more tired and has noted hair loss. At times can feel weak, wonders if not exercising    Pt has had issues with sleep, present for a while but seems to be progressive. Pt falls asleep ok but struggles with waking up and then struggling to get back to sleep. Pt does not take anything for her sleep. Pt has noted some mild pain in hip and wonders if related to her knee pain  Pt does find that her ambulation is ok and that is going well. Sx are more in knee than hip. No issues getting in and out of her car. They have discussed cortisone injections.          Except as noted in the history of present illness as above, the review of systems is negative for the following:    General ROS: negative  Psychological ROS: negative  Allergy and Immunology ROS: negative  Hematological and Lymphatic ROS: negative  Respiratory ROS: no cough, shortness of breath, or wheezing  Cardiovascular ROS: no chest pain or dyspnea on exertion  Gastrointestinal ROS: no abdominal pain, change in bowel habits, or black or bloody stools  Genito-Urinary ROS: no dysuria, trouble voiding, or hematuria  Musculoskeletal ROS: negative  Dermatological ROS: negative      Past medical, surgical, and social history reviewed and updated. Medications and allergies reviewed and updated        /83 (Site: Right Upper Arm, Position: Sitting, Cuff Size: Medium Adult)   Pulse 71   Temp 97.3 °F (36.3 °C) (Temporal)   Ht 5' 8\" (1.727 m)   Wt 158 lb (71.7 kg)   SpO2 97%   BMI 24.02 kg/m²   General appearance - healthy, alert, no distress  Skin - Skin color, texture, turgor normal. No rashes or lesions. No suspicious findings  Head - Normocephalic. No masses, lesions, tenderness or abnormalities  Eyes - conjunctivae/corneas clear. Pupils equal and reactive to light and accomodation, extraocular muscles intact. Ears - External ears normal. Canals clear. Tympanic membranes normal bilaterally. Nose/Sinuses - Nares normal. Septum midline. Mucosa normal. No drainage or sinus tenderness. Oropharynx - Lips, mucosa, and tongue normal. Teeth and gums normal.   Neck - Neck supple. No adenopathy. Thyroid symmetric, normal size, no carotid bruit bilaterally  Back - Back symmetric, no curvature. Range of motion normal. No Costovertebral angle tenderness. Lungs - Percussion normal. Good diaphragmatic excursion. Lungs clear without wheeze, rales, crackles  Heart - Regular rate and rhythm, with no rub, murmur or gallop noted. Abdomen - Abdomen soft, non-tender. Bowel sounds normal. No masses, tenderness or organomegaly  Breasts: breasts appear normal, no suspicious masses, no skin or nipple changes or axillary nodes. Self exam is encouraged. Pelvis: normal external genitalia, vulva, vagina, cervix, uterus and adnexa, PAP: Pap smear done today, thin-prep method, HPV test.   Extremities - Extremities normal. No deformities, edema, or skin discoloration  Musculoskeletal - Spine ROM normal. Muscular strength intact.   Peripheral pulses - radial=4/4,, femoral=4/4, popliteal=4/4, dorsalis pedis=4/4,  Neuro - Gait normal. Reflexes normal and symmetric. Sensation grossly normal.  No focal weakness  Psych - euthymic, no suicidal thoughts or ideation, mood stable. Exam chaperoned by female assistant. ASSESSMENT / PLAN:    1. Well woman exam  No focal abnormalities on exam  Anticipatory guidance discussed. Pap/pelvic/breast exam today  Check bloodwork for:  - CBC; Future  - Comprehensive Metabolic Panel; Future  - Lipid Panel; Future  - TSH without Reflex; Future  - T4, Free; Future  - Covid-19, Antibody; Future  - Hemoglobin A1C; Future  - Vitamin D 25 Hydroxy; Future  - Iron and TIBC; Future  - Ferritin; Future  - PAP SMEAR    2. Essential hypertension, benign  blood pressure stable @ goal, controlled  Check bloodwork for:  - CBC; Future  - Comprehensive Metabolic Panel; Future  - Lipid Panel; Future    3. Primary osteoarthritis of both knees  Moderate end stage dz  F/u with ortho    4. Chronic venous insufficiency  Cont supportive therapy and monitor    5. Hyperglycemia  - Hemoglobin A1C; Future    6. Fatigue, unspecified type  - CBC; Future  - TSH without Reflex; Future  - T4, Free; Future  - Vitamin D 25 Hydroxy; Future  - Iron and TIBC; Future  - Ferritin; Future    7. Family history of hemochromatosis  Check ferritin level    8. Needs flu shot  Encouraged pt get annual flu shot    9. Need for shingles vaccine  Pt is due for update on shingles vaccine. Pt is given information on Shingrix vaccine, need for 2 doses spaced 2-6 months apart, and that due to medicare requirements, they may need to get vaccine at pharmacy. Pt given information/prescription if appropriate. Limited stock availability also discussed. Follow-up appointment:   1 year/prn/pending bloodwork results    Discussed use, benefit, and side effects of all prescribed medications. Barriers to medication compliance addressed. All patient questions answered. Pt voiced understanding.   When applicable, patient's outside records were reviewed through [facility]. The patient has signed appropriate paperworks/consents.

## 2020-11-19 ENCOUNTER — TELEPHONE (OUTPATIENT)
Dept: FAMILY MEDICINE CLINIC | Age: 60
End: 2020-11-19

## 2020-11-19 DIAGNOSIS — Z01.419 WELL WOMAN EXAM: Primary | ICD-10-CM

## 2020-11-19 DIAGNOSIS — I10 ESSENTIAL HYPERTENSION, BENIGN: ICD-10-CM

## 2020-11-19 NOTE — TELEPHONE ENCOUNTER
Galindo Burrows called stating she is scheduled for knee surgery on 12/8/2020. She set up her pre-op visit with Dr. Brenden Greene for Monday 11/23/2020. Dr. Brenden Greene ordered lab work for her on 11/13/2020, and she wants to make sure, per her surgeon Dr. Betty Conway, that the CBC is ordered with Diff. She also wants to make sure all of her labs are coded correctly. She states her iron studies, thyroid studies, and CBC with Diff should have \"medical\" diagnoses and the rest of the labs should be coded as preventative. She wants to have her labs done today. Please advise. Thanks.           Galindo Stormy [phone] (home)

## 2020-11-20 ENCOUNTER — TELEPHONE (OUTPATIENT)
Dept: FAMILY MEDICINE CLINIC | Age: 60
End: 2020-11-20

## 2020-11-20 ENCOUNTER — OFFICE VISIT (OUTPATIENT)
Dept: PRIMARY CARE CLINIC | Age: 60
End: 2020-11-20
Payer: COMMERCIAL

## 2020-11-20 PROCEDURE — 99211 OFF/OP EST MAY X REQ PHY/QHP: CPT | Performed by: NURSE PRACTITIONER

## 2020-11-20 NOTE — PROGRESS NOTES
Amanda Prater received a viral test for COVID-19. They were educated on isolation and quarantine as appropriate. For any symptoms, they were directed to seek care from their PCP, given contact information to establish with a doctor, directed to an urgent care or the emergency room.

## 2020-11-23 ENCOUNTER — TELEPHONE (OUTPATIENT)
Dept: FAMILY MEDICINE CLINIC | Age: 60
End: 2020-11-23

## 2020-11-23 LAB — SARS-COV-2, NAA: NOT DETECTED

## 2020-11-24 DIAGNOSIS — Z01.419 WELL WOMAN EXAM: ICD-10-CM

## 2020-11-24 DIAGNOSIS — R53.83 FATIGUE, UNSPECIFIED TYPE: ICD-10-CM

## 2020-11-24 DIAGNOSIS — R73.9 HYPERGLYCEMIA: ICD-10-CM

## 2020-11-24 DIAGNOSIS — I10 ESSENTIAL HYPERTENSION, BENIGN: ICD-10-CM

## 2020-11-24 LAB
A/G RATIO: 1.9 (ref 1.1–2.2)
ALBUMIN SERPL-MCNC: 4.6 G/DL (ref 3.4–5)
ALP BLD-CCNC: 68 U/L (ref 40–129)
ALT SERPL-CCNC: 24 U/L (ref 10–40)
ANION GAP SERPL CALCULATED.3IONS-SCNC: 13 MMOL/L (ref 3–16)
AST SERPL-CCNC: 18 U/L (ref 15–37)
BASOPHILS ABSOLUTE: 0 K/UL (ref 0–0.2)
BASOPHILS RELATIVE PERCENT: 0.6 %
BILIRUB SERPL-MCNC: 0.4 MG/DL (ref 0–1)
BUN BLDV-MCNC: 11 MG/DL (ref 7–20)
CALCIUM SERPL-MCNC: 9.8 MG/DL (ref 8.3–10.6)
CHLORIDE BLD-SCNC: 102 MMOL/L (ref 99–110)
CHOLESTEROL, TOTAL: 223 MG/DL (ref 0–199)
CO2: 25 MMOL/L (ref 21–32)
CREAT SERPL-MCNC: <0.5 MG/DL (ref 0.6–1.1)
EOSINOPHILS ABSOLUTE: 0.1 K/UL (ref 0–0.6)
EOSINOPHILS RELATIVE PERCENT: 2 %
FERRITIN: 633.6 NG/ML (ref 15–150)
GFR AFRICAN AMERICAN: >60
GFR NON-AFRICAN AMERICAN: >60
GLOBULIN: 2.4 G/DL
GLUCOSE BLD-MCNC: 109 MG/DL (ref 70–99)
HCT VFR BLD CALC: 41.7 % (ref 36–48)
HDLC SERPL-MCNC: 83 MG/DL (ref 40–60)
HEMOGLOBIN: 14 G/DL (ref 12–16)
IRON SATURATION: 57 % (ref 15–50)
IRON: 151 UG/DL (ref 37–145)
LDL CHOLESTEROL CALCULATED: 123 MG/DL
LYMPHOCYTES ABSOLUTE: 1.8 K/UL (ref 1–5.1)
LYMPHOCYTES RELATIVE PERCENT: 31.4 %
MCH RBC QN AUTO: 35.5 PG (ref 26–34)
MCHC RBC AUTO-ENTMCNC: 33.4 G/DL (ref 31–36)
MCV RBC AUTO: 106.2 FL (ref 80–100)
MONOCYTES ABSOLUTE: 0.5 K/UL (ref 0–1.3)
MONOCYTES RELATIVE PERCENT: 8.8 %
NEUTROPHILS ABSOLUTE: 3.3 K/UL (ref 1.7–7.7)
NEUTROPHILS RELATIVE PERCENT: 57.2 %
PDW BLD-RTO: 13 % (ref 12.4–15.4)
PLATELET # BLD: 328 K/UL (ref 135–450)
PMV BLD AUTO: 9 FL (ref 5–10.5)
POTASSIUM SERPL-SCNC: 4.6 MMOL/L (ref 3.5–5.1)
RBC # BLD: 3.93 M/UL (ref 4–5.2)
SARS-COV-2 ANTIBODY, TOTAL: NEGATIVE
SODIUM BLD-SCNC: 140 MMOL/L (ref 136–145)
T4 FREE: 1 NG/DL (ref 0.9–1.8)
TOTAL IRON BINDING CAPACITY: 266 UG/DL (ref 260–445)
TOTAL PROTEIN: 7 G/DL (ref 6.4–8.2)
TRIGL SERPL-MCNC: 86 MG/DL (ref 0–150)
TSH SERPL DL<=0.05 MIU/L-ACNC: 1.94 UIU/ML (ref 0.27–4.2)
VITAMIN D 25-HYDROXY: 55.8 NG/ML
VLDLC SERPL CALC-MCNC: 17 MG/DL
WBC # BLD: 5.8 K/UL (ref 4–11)

## 2020-11-25 ENCOUNTER — OFFICE VISIT (OUTPATIENT)
Dept: FAMILY MEDICINE CLINIC | Age: 60
End: 2020-11-25
Payer: COMMERCIAL

## 2020-11-25 VITALS
SYSTOLIC BLOOD PRESSURE: 139 MMHG | TEMPERATURE: 97.5 F | OXYGEN SATURATION: 97 % | WEIGHT: 162.3 LBS | HEART RATE: 83 BPM | DIASTOLIC BLOOD PRESSURE: 83 MMHG | HEIGHT: 68 IN | BODY MASS INDEX: 24.6 KG/M2

## 2020-11-25 PROBLEM — M17.0 PRIMARY OSTEOARTHRITIS OF BOTH KNEES: Status: ACTIVE | Noted: 2020-11-25

## 2020-11-25 LAB
ESTIMATED AVERAGE GLUCOSE: 93.9 MG/DL
HBA1C MFR BLD: 4.9 %

## 2020-11-25 PROCEDURE — 93000 ELECTROCARDIOGRAM COMPLETE: CPT | Performed by: FAMILY MEDICINE

## 2020-11-25 PROCEDURE — 99244 OFF/OP CNSLTJ NEW/EST MOD 40: CPT | Performed by: FAMILY MEDICINE

## 2020-11-25 PROCEDURE — G8420 CALC BMI NORM PARAMETERS: HCPCS | Performed by: FAMILY MEDICINE

## 2020-11-25 PROCEDURE — G8484 FLU IMMUNIZE NO ADMIN: HCPCS | Performed by: FAMILY MEDICINE

## 2020-11-25 PROCEDURE — G8427 DOCREV CUR MEDS BY ELIG CLIN: HCPCS | Performed by: FAMILY MEDICINE

## 2020-11-25 RX ORDER — ALPRAZOLAM 0.5 MG/1
0.5 TABLET ORAL 3 TIMES DAILY PRN
Qty: 90 TABLET | Refills: 2 | Status: SHIPPED | OUTPATIENT
Start: 2020-11-25 | End: 2021-07-15 | Stop reason: SDUPTHER

## 2020-11-25 RX ORDER — BACILLUS COAGULANS/INULIN 1B-250 MG
1 CAPSULE ORAL DAILY
COMMUNITY

## 2020-11-25 NOTE — PROGRESS NOTES
Subjective:    Chief Complaint:     Bacilio Albrecht is a 62 y.o. female who presents for a preoperative physical examination. She is scheduled to have L knee TKR done by Dr. Krista Emanuel at [facility] in [place] on 12/8/2020. Surgery is outpatient. Pt has had chronic issues with osteoarthritis to knee bilaterally, but L > R.  Pt has been working with conseravtive therapy but with persistent pain, is set for surgery. Pt set for surgery d/t persistent discomfort. Pt was given mobic and gabapentin qhs for before/after surgery. Pt here for follow up of blood pressure. Pt states doing great with adherence to therapy and feels well. No issues of chest pain, shortness of breath. No vision changes, headache, swelling in legs. Did have some bloodwork showing high iron and ferritin. 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     Hives     Primary osteoarthritis of both knees 11/25/2020        Review of patient's past surgical history indicates:     Past Surgical History:   Procedure Laterality Date    ECTOPIC PREGNANCY SURGERY      LAPAROSCOPY      early 25s                                               No anesthesia complications       Current Outpatient Medications   Medication Sig Dispense Refill    vitamin B-12 (CYANOCOBALAMIN) 1000 MCG tablet Take 1,000 mcg by mouth daily      ALPRAZolam (XANAX) 0.5 MG tablet TK 1 T PO  TID PRN FOR SLEEP OR ANXIETY      lisinopril (PRINIVIL;ZESTRIL) 10 MG tablet TAKE 1 TABLET BY MOUTH EVERY DAY 90 tablet 1    Psyllium (METAMUCIL FIBER PO) Take by mouth      Multiple Vitamins-Minerals (THERAPEUTIC MULTIVITAMIN-MINERALS) tablet Take 1 tablet by mouth daily      Vitamin D (CHOLECALCIFEROL) 1000 UNITS CAPS capsule Take 1,000 Units by mouth daily.          No current facility-administered medications for this visit. Allergies   Allergen Reactions    Dust Mite Extract     Macrobid [Nitrofurantoin Macrocrystal] Rash       Social History     Tobacco Use    Smoking status: Former Smoker     Packs/day: 0.50     Years: 5.00     Pack years: 2.50     Last attempt to quit: 1998     Years since quittin.2    Smokeless tobacco: Never Used   Substance Use Topics    Alcohol use: Yes     Comment: socially, 5x per week    Drug use: Not on file        Family History   Problem Relation Age of Onset    Hemochromatosis Brother     Hemochromatosis Sister     Hemochromatosis Mother     Thyroid Disease Mother     Hemochromatosis Father     Hypertension Father     Hypertension Brother     Hypertension Sister     Cancer Maternal Grandmother         bone        Review Of Systems    Skin: no abnormal pigmentation, rash, scaling, itching, masses, hair or nail changes  Eyes: negative  Ears/Nose/Throat: negative  Respiratory: negative  Cardiovascular: negative  Gastrointestinal: negative  Genitourinary: negative  Musculoskeletal: negative  Neurologic: negative  Psychiatric: negative  Hematologic/Lymphatic/Immunologic: negative  Endocrine: negative       Objective:      /83   Pulse 83   Temp 97.5 °F (36.4 °C) (Temporal)   Ht 5' 8\" (1.727 m)   Wt 162 lb 4.8 oz (73.6 kg)   SpO2 97%   BMI 24.68 kg/m²   General appearance - healthy, alert, no distress  Skin - Skin color, texture, turgor normal. No rashes or lesions. Head - Normocephalic. No masses, lesions, tenderness or abnormalities  Eyes - conjunctivae/corneas clear. PERRL, EOM's intact. Ears - External ears normal. Canals clear. TM's normal.  Nose/Sinuses - Nares normal. Septum midline. Mucosa normal. No drainage or sinus tenderness. Oropharynx - Lips, mucosa, and tongue normal. Teeth and gums normal.   Neck - Neck supple. No adenopathy. Thyroid symmetric, normal size,  Back - Back symmetric, no curvature.  ROM normal. No CVA tenderness. Lungs - Percussion normal. Good diaphragmatic excursion. Lungs clear  Heart - Regular rate and rhythm, with no rub, murmur or gallop noted. Abdomen - Abdomen soft, non-tender. BS normal. No masses, organomegaly  Extremities - Extremities normal. No deformities, edema, or skin discoloration  Musculoskeletal - Spine ROM normal. Muscular strength intact. Peripheral pulses - radial=4/4,, femoral=4/4, popliteal=4/4, dorsalis pedis=4/4,  Neuro - Gait normal. Reflexes normal and symmetric. Sensation grossly normal.  No focal weakness    EKG: normal EKG, normal sinus rhythm, unchanged from previous tracings.        Orders Only on 11/24/2020   Component Date Value Ref Range Status    WBC 11/24/2020 5.8  4.0 - 11.0 K/uL Final    RBC 11/24/2020 3.93* 4.00 - 5.20 M/uL Final    Hemoglobin 11/24/2020 14.0  12.0 - 16.0 g/dL Final    Hematocrit 11/24/2020 41.7  36.0 - 48.0 % Final    MCV 11/24/2020 106.2* 80.0 - 100.0 fL Final    MCH 11/24/2020 35.5* 26.0 - 34.0 pg Final    MCHC 11/24/2020 33.4  31.0 - 36.0 g/dL Final    RDW 11/24/2020 13.0  12.4 - 15.4 % Final    Platelets 71/70/0026 328  135 - 450 K/uL Final    MPV 11/24/2020 9.0  5.0 - 10.5 fL Final    Neutrophils % 11/24/2020 57.2  % Final    Lymphocytes % 11/24/2020 31.4  % Final    Monocytes % 11/24/2020 8.8  % Final    Eosinophils % 11/24/2020 2.0  % Final    Basophils % 11/24/2020 0.6  % Final    Neutrophils Absolute 11/24/2020 3.3  1.7 - 7.7 K/uL Final    Lymphocytes Absolute 11/24/2020 1.8  1.0 - 5.1 K/uL Final    Monocytes Absolute 11/24/2020 0.5  0.0 - 1.3 K/uL Final    Eosinophils Absolute 11/24/2020 0.1  0.0 - 0.6 K/uL Final    Basophils Absolute 11/24/2020 0.0  0.0 - 0.2 K/uL Final    Ferritin 11/24/2020 633.6* 15.0 - 150.0 ng/mL Final    Iron 11/24/2020 151* 37 - 145 ug/dL Final    TIBC 11/24/2020 266  260 - 445 ug/dL Final    Iron Saturation 11/24/2020 57* 15 - 50 % Final    Vit D, 25-Hydroxy 11/24/2020 55.8  >=30 ng/mL Final Comment: <=20 ng/mL. ........... [place] Stands Deficient  21-29 ng/mL. ......... [place] Stands Insufficient  >=30 ng/mL. ........ [place] Stands Sufficient      Hemoglobin A1C 11/24/2020 4.9  See comment % Final    Comment: Comment:  Diagnosis of Diabetes: > or = 6.5%  Increased risk of diabetes (Prediabetes): 5.7-6.4%  Glycemic Control: Nonpregnant Adults: <7.0%                    Pregnant: <6.0%        eAG 11/24/2020 93.9  mg/dL Final    T4 Free 11/24/2020 1.0  0.9 - 1.8 ng/dL Final    TSH 11/24/2020 1.94  0.27 - 4.20 uIU/mL Final    Cholesterol, Total 11/24/2020 223* 0 - 199 mg/dL Final    Triglycerides 11/24/2020 86  0 - 150 mg/dL Final    HDL 11/24/2020 83* 40 - 60 mg/dL Final    LDL Calculated 11/24/2020 123* <100 mg/dL Final    VLDL Cholesterol Calculated 11/24/2020 17  Not Established mg/dL Final    Sodium 11/24/2020 140  136 - 145 mmol/L Final    Potassium 11/24/2020 4.6  3.5 - 5.1 mmol/L Final    Chloride 11/24/2020 102  99 - 110 mmol/L Final    CO2 11/24/2020 25  21 - 32 mmol/L Final    Anion Gap 11/24/2020 13  3 - 16 Final    Glucose 11/24/2020 109* 70 - 99 mg/dL Final    BUN 11/24/2020 11  7 - 20 mg/dL Final    CREATININE 11/24/2020 <0.5* 0.6 - 1.1 mg/dL Final    GFR Non- 11/24/2020 >60  >60 Final    Comment: >60 mL/min/1.73m2 EGFR, calc. for ages 25 and older using the  MDRD formula (not corrected for weight), is valid for stable  renal function.  GFR  11/24/2020 >60  >60 Final    Comment: Chronic Kidney Disease: less than 60 ml/min/1.73 sq.m. Kidney Failure: less than 15 ml/min/1.73 sq.m. Results valid for patients 18 years and older.       Calcium 11/24/2020 9.8  8.3 - 10.6 mg/dL Final    Total Protein 11/24/2020 7.0  6.4 - 8.2 g/dL Final    Alb 11/24/2020 4.6  3.4 - 5.0 g/dL Final    Albumin/Globulin Ratio 11/24/2020 1.9  1.1 - 2.2 Final    Total Bilirubin 11/24/2020 0.4  0.0 - 1.0 mg/dL Final    Alkaline Phosphatase 11/24/2020 68  40 - 129 U/L Final    ALT 11/24/2020 24 10 - 40 U/L Final    AST 11/24/2020 18  15 - 37 U/L Final    Globulin 11/24/2020 2.4  g/dL Final    SARS-CoV-2, Total 11/24/2020 Negative  Negative Final    Comment: Negative results do not rule out SARS-CoV-2 infection, particularly  in those who have been in contact with the virus. Serum or plasma  samples from the early (pre-seroconversion) phase of illness can  yield negative findings. Therefore, this test cannot be used to  diagnose an acute infection. Also, over time, titers may decline and  eventually become negative. Testing with a molecular diagnostic should  be performed to evaluate for active infection in symptomatic individuals. It is not known at this time if the presence of antibodies to SARS-CoV-2  confers immunity to reinfection. A positive result suggests exposure to SARS-CoV-2 (COVID-19) but does  not necessarily indicate immunity. Results from antibody testing should  not be used as the sole basis to diagnose or exclude SARS-CoV-2 infection  or to inform infection status. This test has been authorized by the FDA under an Emergency Use  Authorization (EUA) for use by authorized laboratories. Fact sheet for [employer]                           viders:  BuildHer.es  Fact sheet for Patients:  BuildHer.es  METHODOLOGY: ECIA          ASSESSMENT / PLAN:    1. Preop examination  Medically cleared for surgery. - EKG 12 lead  - Culture, MRSA, Screening    2. Primary osteoarthritis of both knees  Chronic progressive sx, set for surgery  Medically cleared for surgery. 3. Essential hypertension, benign  blood pressure stable @ goal    4. Elevated ferritin  Reviewed bloodwork  Suspect hemochromatosis  Aware need for formal eval and tx  Check bloodwork as below  Management pending results. - Vitamin B12 & Folate; Future  - HEMOCHROMATOSIS MUTATION; Future    5. Family history of hemochromatosis  Check bloodwork as above    6. Primary insomnia  Stable w/o flare  Cont prn xanax  Use intermittent  See [facility]SM  Pt aware of need for every 3 month medication followup appointments, and that medication refills for benzodiazepines, narcotics and/or stimulants will only be given at appointment. - ALPRAZolam (XANAX) 0.5 MG tablet; Take 1 tablet by mouth 3 times daily as needed for Sleep or Anxiety for up to 30 days. Dispense: 90 tablet; Refill: 2    7. Anxiety  Stable w/o flare  Cont prn xanax, montor  See CSM  - ALPRAZolam (XANAX) 0.5 MG tablet; Take 1 tablet by mouth 3 times daily as needed for Sleep or Anxiety for up to 30 days. Dispense: 90 tablet; Refill: 2           Follow-up appointment:   After surgery/prn/pending f/u bloodwork results    Discussed use, benefit, and side effects of all prescribed medications. Barriers to medication compliance addressed. All patient questions answered. Pt voiced understanding. When applicable, patient's outside records were reviewed through [facility]. The patient has signed appropriate paperworks/consents. Per encounter diagnoses   She is medically cleared for surgery and anesthesia. Avoid Aspirin, non steroidal anti inflammatory medications, including Motrin, Aleve, Ibuprofen, Advil; multi vitamins, Vitamin E, omega 3 fish oil, and glucosamine chondroitin for the 7 days prior to surgery.

## 2020-11-26 LAB — MRSA CULTURE ONLY: NORMAL

## 2020-11-30 ENCOUNTER — TELEPHONE (OUTPATIENT)
Dept: FAMILY MEDICINE CLINIC | Age: 60
End: 2020-11-30

## 2020-12-01 DIAGNOSIS — R79.89 ELEVATED FERRITIN: ICD-10-CM

## 2020-12-02 LAB
FOLATE: 11.29 NG/ML (ref 4.78–24.2)
VITAMIN B-12: 1240 PG/ML (ref 211–911)

## 2020-12-08 LAB
C282Y HEMOCHROMATOSIS MUT: NORMAL
H63D HEMOCHROMATOSIS MUT: NEGATIVE
HEMOCHROMATOSIS GENE ANALYSIS: NORMAL
HFE PCR SPECIMEN: NORMAL
S65C HEMOCHROMATOSIS MUT: NEGATIVE

## 2020-12-15 ENCOUNTER — TELEPHONE (OUTPATIENT)
Dept: VASCULAR SURGERY | Age: 60
End: 2020-12-15

## 2020-12-21 NOTE — TELEPHONE ENCOUNTER
Spoke to patient. She had her knee surgery on 12/8. I will call back in a few months (February) to schedule if patient is ready.

## 2021-02-18 ENCOUNTER — TELEPHONE (OUTPATIENT)
Dept: FAMILY MEDICINE CLINIC | Age: 61
End: 2021-02-18

## 2021-02-18 NOTE — TELEPHONE ENCOUNTER
Patient states she was in for a pre-op exam on 11/25/2020 and the bill was over $230 (after covered by a contracted rate through her wellness plan). Is this a normal charge for a pre-op visit? Patient states it seems very high. Please reach out to patient at # [phone].  (Patient requested to speak with the .)

## 2021-02-23 NOTE — TELEPHONE ENCOUNTER
Pt advised that a pre op apt is $230   If insurance doesn't cover, pt is responsible for the balance.   Pt states that she would still like to speak to     (ondina) advised

## 2021-04-09 ENCOUNTER — TELEPHONE (OUTPATIENT)
Dept: FAMILY MEDICINE CLINIC | Age: 61
End: 2021-04-09

## 2021-04-09 NOTE — TELEPHONE ENCOUNTER
Pt called in to get test results from [facility] and would like a call back with them. Wants a clinical team member to call her and let her know what they are and what they mean. Please advise, thank you!

## 2021-05-14 ENCOUNTER — TELEPHONE (OUTPATIENT)
Dept: FAMILY MEDICINE CLINIC | Age: 61
End: 2021-05-14

## 2021-05-14 RX ORDER — LISINOPRIL 10 MG/1
TABLET ORAL
Qty: 90 TABLET | Refills: 1 | Status: SHIPPED | OUTPATIENT
Start: 2021-05-14 | End: 2022-01-03

## 2021-05-14 NOTE — TELEPHONE ENCOUNTER
PT called in saying the Manager was suppose to give a call back regarding her account that is in collections. ,     Informed that is a billing office call and tried to provide their number. She stated she was told it was a coding issue. Would like a call back asap. Please advise, thank you!

## 2021-05-14 NOTE — TELEPHONE ENCOUNTER
Requested Prescriptions     Pending Prescriptions Disp Refills    lisinopril (PRINIVIL;ZESTRIL) 10 MG tablet 90 tablet 1     Sig: TAKE 1 TABLET BY MOUTH EVERY DAY     Last ov: 11/25/2020  Last labs: 12/1/2020

## 2021-05-14 NOTE — TELEPHONE ENCOUNTER
Medication name: lisinopril   Medication dose: 10 mg  Frequency: TAKE 1 TABLET BY MOUTH EVERY DAY  Quantity: 90 tabs with 1 refill    Pharmacy name: [address], [place] [address] - P [phone] [place] [phone]     Last ov: 11/25/2020  Last labs: 12/1/2020      Please follow up with Isa Alejandra [phone] (home)

## 2021-07-15 ENCOUNTER — TELEPHONE (OUTPATIENT)
Dept: FAMILY MEDICINE CLINIC | Age: 61
End: 2021-07-15

## 2021-07-15 ENCOUNTER — OFFICE VISIT (OUTPATIENT)
Dept: FAMILY MEDICINE CLINIC | Age: 61
End: 2021-07-15
Payer: COMMERCIAL

## 2021-07-15 VITALS
RESPIRATION RATE: 12 BRPM | DIASTOLIC BLOOD PRESSURE: 88 MMHG | HEART RATE: 73 BPM | SYSTOLIC BLOOD PRESSURE: 132 MMHG | TEMPERATURE: 97.8 F | BODY MASS INDEX: 22.62 KG/M2 | OXYGEN SATURATION: 98 % | WEIGHT: 148.8 LBS

## 2021-07-15 DIAGNOSIS — R35.0 URINE FREQUENCY: Primary | ICD-10-CM

## 2021-07-15 DIAGNOSIS — F51.01 PRIMARY INSOMNIA: ICD-10-CM

## 2021-07-15 DIAGNOSIS — I10 ESSENTIAL HYPERTENSION, BENIGN: ICD-10-CM

## 2021-07-15 DIAGNOSIS — J06.9 ACUTE URI: ICD-10-CM

## 2021-07-15 DIAGNOSIS — E83.110 HEREDITARY HEMOCHROMATOSIS (HCC): ICD-10-CM

## 2021-07-15 DIAGNOSIS — F41.9 ANXIETY: ICD-10-CM

## 2021-07-15 LAB
BILIRUBIN, POC: NEGATIVE
BLOOD URINE, POC: ABNORMAL
CLARITY, POC: ABNORMAL
COLOR, POC: YELLOW
GLUCOSE URINE, POC: NEGATIVE
KETONES, POC: NEGATIVE
LEUKOCYTE EST, POC: ABNORMAL
NITRITE, POC: POSITIVE
PH, POC: 7
PROTEIN, POC: ABNORMAL
SPECIFIC GRAVITY, POC: 1.01
UROBILINOGEN, POC: 0.2

## 2021-07-15 PROCEDURE — G8420 CALC BMI NORM PARAMETERS: HCPCS | Performed by: FAMILY MEDICINE

## 2021-07-15 PROCEDURE — 3017F COLORECTAL CA SCREEN DOC REV: CPT | Performed by: FAMILY MEDICINE

## 2021-07-15 PROCEDURE — 1036F TOBACCO NON-USER: CPT | Performed by: FAMILY MEDICINE

## 2021-07-15 PROCEDURE — G8427 DOCREV CUR MEDS BY ELIG CLIN: HCPCS | Performed by: FAMILY MEDICINE

## 2021-07-15 PROCEDURE — 81002 URINALYSIS NONAUTO W/O SCOPE: CPT | Performed by: FAMILY MEDICINE

## 2021-07-15 PROCEDURE — 99214 OFFICE O/P EST MOD 30 MIN: CPT | Performed by: FAMILY MEDICINE

## 2021-07-15 RX ORDER — MELOXICAM 15 MG/1
TABLET ORAL
COMMUNITY
Start: 2021-06-21 | End: 2021-07-29

## 2021-07-15 RX ORDER — SULFAMETHOXAZOLE AND TRIMETHOPRIM 800; 160 MG/1; MG/1
1 TABLET ORAL 2 TIMES DAILY
Qty: 14 TABLET | Refills: 0 | Status: SHIPPED | OUTPATIENT
Start: 2021-07-15 | End: 2021-07-22

## 2021-07-15 RX ORDER — DULOXETIN HYDROCHLORIDE 30 MG/1
CAPSULE, DELAYED RELEASE ORAL
COMMUNITY
Start: 2021-06-21 | End: 2021-07-29

## 2021-07-15 RX ORDER — ONDANSETRON 4 MG/1
4 TABLET, ORALLY DISINTEGRATING ORAL EVERY 8 HOURS PRN
Qty: 20 TABLET | Refills: 0 | Status: SHIPPED | OUTPATIENT
Start: 2021-07-15 | End: 2021-12-06

## 2021-07-15 RX ORDER — CIPROFLOXACIN 500 MG/1
500 TABLET, FILM COATED ORAL 2 TIMES DAILY
Qty: 10 TABLET | Refills: 0 | Status: SHIPPED | OUTPATIENT
Start: 2021-07-15 | End: 2021-07-20

## 2021-07-15 RX ORDER — KETOCONAZOLE 20 MG/G
CREAM TOPICAL
COMMUNITY
Start: 2021-04-21 | End: 2021-12-06

## 2021-07-15 RX ORDER — GABAPENTIN 100 MG/1
CAPSULE ORAL
COMMUNITY
Start: 2021-06-21 | End: 2021-12-06

## 2021-07-15 RX ORDER — ALPRAZOLAM 0.5 MG/1
0.5 TABLET ORAL 3 TIMES DAILY PRN
Qty: 90 TABLET | Refills: 2 | Status: SHIPPED | OUTPATIENT
Start: 2021-07-15 | End: 2022-07-11 | Stop reason: SDUPTHER

## 2021-07-15 SDOH — ECONOMIC STABILITY: TRANSPORTATION INSECURITY
IN THE PAST 12 MONTHS, HAS LACK OF TRANSPORTATION KEPT YOU FROM MEETINGS, WORK, OR FROM GETTING THINGS NEEDED FOR DAILY LIVING?: NO

## 2021-07-15 SDOH — ECONOMIC STABILITY: FOOD INSECURITY: WITHIN THE PAST 12 MONTHS, YOU WORRIED THAT YOUR FOOD WOULD RUN OUT BEFORE YOU GOT MONEY TO BUY MORE.: NEVER TRUE

## 2021-07-15 SDOH — ECONOMIC STABILITY: TRANSPORTATION INSECURITY
IN THE PAST 12 MONTHS, HAS THE LACK OF TRANSPORTATION KEPT YOU FROM MEDICAL APPOINTMENTS OR FROM GETTING MEDICATIONS?: NO

## 2021-07-15 SDOH — ECONOMIC STABILITY: FOOD INSECURITY: WITHIN THE PAST 12 MONTHS, THE FOOD YOU BOUGHT JUST DIDN'T LAST AND YOU DIDN'T HAVE MONEY TO GET MORE.: NEVER TRUE

## 2021-07-15 ASSESSMENT — PATIENT HEALTH QUESTIONNAIRE - PHQ9
SUM OF ALL RESPONSES TO PHQ QUESTIONS 1-9: 0
1. LITTLE INTEREST OR PLEASURE IN DOING THINGS: 0
SUM OF ALL RESPONSES TO PHQ QUESTIONS 1-9: 0
SUM OF ALL RESPONSES TO PHQ9 QUESTIONS 1 & 2: 0
2. FEELING DOWN, DEPRESSED OR HOPELESS: 0
SUM OF ALL RESPONSES TO PHQ QUESTIONS 1-9: 0

## 2021-07-15 ASSESSMENT — SOCIAL DETERMINANTS OF HEALTH (SDOH): HOW HARD IS IT FOR YOU TO PAY FOR THE VERY BASICS LIKE FOOD, HOUSING, MEDICAL CARE, AND HEATING?: NOT HARD AT ALL

## 2021-07-15 NOTE — PROGRESS NOTES
Here for eval of some UTI sx, with some urgency and frequency. Pt has noted some mild dysuria. Sx have been on and off for about 2-3 weeks. Pt is have some chills, malaise. Loss of appetite. Pt states that she has been dealing with some moderate stressors with death of mother 1 week ago. Pt already is on cymbalta for her knee s/p TKR with some persistent knee pain, on cymbalta 300mg and gabapentin 100mg prn, and mobic 15mg qd. Pt has some persistent inflammation and is following closely with surgery. Surgery is happy with the results despite some persistent discomfort. Pt is working with heme/onc for hemochromatosis. Pt did have increase in ferritin after her knee surgery but has not had any phlebotomy since 3/2021. Levels were high over 900. Did have some URI sx, that started a few weeks ago, that seems to have improved. Pt denies any fever, chills. Except as noted above in the history of present illness, the review of systems is  negative for headache, vision changes, chest pain, shortness of breath,  bowel changes. Past medical, surgical, and social history reviewed and updated  Medications and allergies reviewed and updated        O: /88   Pulse 73   Temp 97.8 °F (36.6 °C) (Temporal)   Resp 12   Wt 148 lb 12.8 oz (67.5 kg)   SpO2 98%   BMI 22.62 kg/m²   GEN: No acute distress, cooperative, well nourished, alert. HEENT: PEERLA, EOMI , normocephalic/atraumatic, nares and oropharynx clear. Mucous membranes normal, Tympanic membranes clear bilaterally. Neck: soft, supple, no thyromegaly, mass, no Lymphadenopathy  CV: Regular rate and rhythm, no murmur, rubs, gallops. No edema. Resp: Clear to auscultation bilaterally good air entry bilaterally  No crackles, wheeze. Breathing comfortably. Psych: mild dysthymia, denies any ST/SI  Abd: soft, mild suprapubic tender to palpation. normoactive bowels sounds.   No hepatosplenomegaly         Current Outpatient Medications [facility])  Needs f/u with heme/onc  Per review, # as high as 900 on ferritin  Declines bloodwork today  Wants 2nd opinion, referred to [facility] heme/onc for eval    3. Essential hypertension, benign  blood pressure stable @ goal, controlled    4. Acute URI  Resolving  Exam nonfocal    5. Primary insomnia  Chronic sx, mildl worse related to stressors  Trial over the counter melatonin 5-10mg qhs  Discussed use, benefit, and side effects of prescribed medications. Barriers to medication compliance addressed. All patient questions answered. Pt voiced understanding. Cont prn xanax, use intermittent  - ALPRAZolam (XANAX) 0.5 MG tablet; Take 1 tablet by mouth 3 times daily as needed for Sleep or Anxiety for up to 30 days. Dispense: 90 tablet; Refill: 2    6. Anxiety  Moderate increased sx d/t stressors  Discussed tx options. Cont xanax TID prn  Refer back to psychology for evaluation and tx  Consider increase in cymbalta, or change to different SSRI/SNRI  - ALPRAZolam (XANAX) 0.5 MG tablet; Take 1 tablet by mouth 3 times daily as needed for Sleep or Anxiety for up to 30 days. Dispense: 90 tablet; Refill: 2           Follow-up appointment:   Pending urine cx/prn    Discussed use, benefit, and side effects of all prescribed medications. Barriers to medication compliance addressed. All patient questions answered. Pt voiced understanding. When applicable, patient's outside records were reviewed through [place]. The patient has signed appropriate paperworks/consents.

## 2021-07-15 NOTE — TELEPHONE ENCOUNTER
Pt given Bactrim today and wants to make it is safe for her to take due to her being on lisinopril and having high iron levels and high liver levels.   Please advise

## 2021-07-16 ENCOUNTER — TELEPHONE (OUTPATIENT)
Dept: FAMILY MEDICINE CLINIC | Age: 61
End: 2021-07-16

## 2021-07-16 NOTE — TELEPHONE ENCOUNTER
Pt states she spoke to her hematologist and they suggest no sulfa drugs. Pt spoke to Dr. Hannah Angulo last night regarding the bactrim- pt stated she had emesis 1 1/2 after taking. Dr. Hannah Angulo switched her to Cipro and also sent in Zofran. Pt states she did start the Cipro this am because she needed to start some type of treatment however,  Pt states she was doing research on Cipro which causes joint/ligament issues pt is concerned about taking this medication because she has arthritis of both knees. Pt states her chart says she is allergice to Macrobid which causes a rash but she isn't sure if she is allergic. Pt would like to know if Macrobid would be a better option than Cipro as it has less side effects?      Please advise

## 2021-07-16 NOTE — TELEPHONE ENCOUNTER
Patient called after hours due to nausea and emesis. She took 1 dose of Bactrim DS at 3:30 pm. She had emesis X 1 1/2 hour ago. She was in the office today and saw Dr. COOPER and was diagnosed with UTI and had nausea, but no emesis. Her appetite is decreased, but she did eat a few bites of fried shrimp and mac & cheese prior to emesis. She had knee surgery 6 months ago. No new medication and no pain meds taken this pm.  She prefers to have Bactrim switched as she feels it made her more nauseated . A prescription for Cipro 500 mg bid X 5 days was sent to the pharmacy, which she will start tomorrow am.  Push fluids - water. Zofran ODT was also sent to the pharmacy. Advised to eat bland diet and avoid fatty/ spicy/ acidic foods. Advised follow up if symptoms persist or worsen.

## 2021-07-17 LAB
ORGANISM: ABNORMAL
URINE CULTURE, ROUTINE: ABNORMAL

## 2021-07-19 NOTE — TELEPHONE ENCOUNTER
Spoke to pt. She states she has taken 3 days of Cipro and it has caused nausea, itchy eyes and joint pain in her ankles. Pt has read that its not a good abx to take when having a culture with e-coli. Pt wants to know if she should stop taking the Cipro? Have the Cipro switched? Or keep taking the last couple days of the cipro.      Please advise

## 2021-07-19 NOTE — TELEPHONE ENCOUNTER
Patient would like a call back from clinical concerning her labs and the medication she was prescribed. Please advise. Thanks.

## 2021-07-29 ENCOUNTER — APPOINTMENT (OUTPATIENT)
Dept: CT IMAGING | Age: 61
DRG: 392 | End: 2021-07-29
Payer: COMMERCIAL

## 2021-07-29 ENCOUNTER — HOSPITAL ENCOUNTER (INPATIENT)
Age: 61
LOS: 4 days | Discharge: HOME | DRG: 392 | End: 2021-08-02
Attending: EMERGENCY MEDICINE | Admitting: INTERNAL MEDICINE
Payer: COMMERCIAL

## 2021-07-29 ENCOUNTER — TELEPHONE (OUTPATIENT)
Dept: FAMILY MEDICINE CLINIC | Age: 61
End: 2021-07-29

## 2021-07-29 DIAGNOSIS — K57.32 DIVERTICULITIS OF COLON: Primary | ICD-10-CM

## 2021-07-29 DIAGNOSIS — K57.92 DIVERTICULITIS: ICD-10-CM

## 2021-07-29 LAB
ANION GAP SERPL CALCULATED.3IONS-SCNC: 10 MMOL/L (ref 3–16)
BACTERIA: ABNORMAL /HPF
BASOPHILS ABSOLUTE: 0.1 K/UL (ref 0–0.2)
BASOPHILS RELATIVE PERCENT: 0.3 %
BILIRUBIN URINE: ABNORMAL
BLOOD, URINE: ABNORMAL
BUN BLDV-MCNC: 6 MG/DL (ref 7–20)
CALCIUM SERPL-MCNC: 9 MG/DL (ref 8.3–10.6)
CHLORIDE BLD-SCNC: 103 MMOL/L (ref 99–110)
CLARITY: CLEAR
CO2: 25 MMOL/L (ref 21–32)
COLOR: YELLOW
CREAT SERPL-MCNC: <0.5 MG/DL (ref 0.6–1.2)
EOSINOPHILS ABSOLUTE: 0 K/UL (ref 0–0.6)
EOSINOPHILS RELATIVE PERCENT: 0.2 %
EPITHELIAL CELLS, UA: ABNORMAL /HPF (ref 0–5)
GFR AFRICAN AMERICAN: >60
GFR NON-AFRICAN AMERICAN: >60
GLUCOSE BLD-MCNC: 130 MG/DL (ref 70–99)
GLUCOSE URINE: NEGATIVE MG/DL
HCT VFR BLD CALC: 35.9 % (ref 36–48)
HEMOGLOBIN: 12.3 G/DL (ref 12–16)
KETONES, URINE: 15 MG/DL
LEUKOCYTE ESTERASE, URINE: ABNORMAL
LYMPHOCYTES ABSOLUTE: 0.8 K/UL (ref 1–5.1)
LYMPHOCYTES RELATIVE PERCENT: 5 %
MCH RBC QN AUTO: 37.1 PG (ref 26–34)
MCHC RBC AUTO-ENTMCNC: 34.4 G/DL (ref 31–36)
MCV RBC AUTO: 107.8 FL (ref 80–100)
MICROSCOPIC EXAMINATION: YES
MONOCYTES ABSOLUTE: 0.7 K/UL (ref 0–1.3)
MONOCYTES RELATIVE PERCENT: 4.5 %
MUCUS: ABNORMAL /LPF
NEUTROPHILS ABSOLUTE: 13.8 K/UL (ref 1.7–7.7)
NEUTROPHILS RELATIVE PERCENT: 90 %
NITRITE, URINE: NEGATIVE
PDW BLD-RTO: 13.1 % (ref 12.4–15.4)
PH UA: 6 (ref 5–8)
PLATELET # BLD: 369 K/UL (ref 135–450)
PMV BLD AUTO: 7.9 FL (ref 5–10.5)
POTASSIUM REFLEX MAGNESIUM: 4.1 MMOL/L (ref 3.5–5.1)
PROTEIN UA: NEGATIVE MG/DL
RBC # BLD: 3.33 M/UL (ref 4–5.2)
RBC UA: ABNORMAL /HPF (ref 0–4)
RENAL EPITHELIAL, UA: ABNORMAL /HPF (ref 0–1)
SODIUM BLD-SCNC: 138 MMOL/L (ref 136–145)
SPECIFIC GRAVITY UA: 1.02 (ref 1–1.03)
URINE REFLEX TO CULTURE: ABNORMAL
URINE TYPE: ABNORMAL
UROBILINOGEN, URINE: 0.2 E.U./DL
WBC # BLD: 15.4 K/UL (ref 4–11)
WBC UA: ABNORMAL /HPF (ref 0–5)

## 2021-07-29 PROCEDURE — 96368 THER/DIAG CONCURRENT INF: CPT

## 2021-07-29 PROCEDURE — 6360000004 HC RX CONTRAST MEDICATION: Performed by: EMERGENCY MEDICINE

## 2021-07-29 PROCEDURE — 99285 EMERGENCY DEPT VISIT HI MDM: CPT

## 2021-07-29 PROCEDURE — 81001 URINALYSIS AUTO W/SCOPE: CPT

## 2021-07-29 PROCEDURE — 74177 CT ABD & PELVIS W/CONTRAST: CPT

## 2021-07-29 PROCEDURE — 6360000002 HC RX W HCPCS: Performed by: EMERGENCY MEDICINE

## 2021-07-29 PROCEDURE — 85025 COMPLETE CBC W/AUTO DIFF WBC: CPT

## 2021-07-29 PROCEDURE — 1200000000 HC SEMI PRIVATE

## 2021-07-29 PROCEDURE — 2500000003 HC RX 250 WO HCPCS: Performed by: EMERGENCY MEDICINE

## 2021-07-29 PROCEDURE — 87635 SARS-COV-2 COVID-19 AMP PRB: CPT

## 2021-07-29 PROCEDURE — 80048 BASIC METABOLIC PNL TOTAL CA: CPT

## 2021-07-29 PROCEDURE — 36415 COLL VENOUS BLD VENIPUNCTURE: CPT

## 2021-07-29 PROCEDURE — 2580000003 HC RX 258: Performed by: EMERGENCY MEDICINE

## 2021-07-29 PROCEDURE — 96365 THER/PROPH/DIAG IV INF INIT: CPT

## 2021-07-29 PROCEDURE — 80076 HEPATIC FUNCTION PANEL: CPT

## 2021-07-29 PROCEDURE — 6370000000 HC RX 637 (ALT 250 FOR IP): Performed by: EMERGENCY MEDICINE

## 2021-07-29 RX ORDER — IBUPROFEN 400 MG/1
800 TABLET ORAL ONCE
Status: COMPLETED | OUTPATIENT
Start: 2021-07-29 | End: 2021-07-29

## 2021-07-29 RX ORDER — ACETAMINOPHEN 325 MG/1
650 TABLET ORAL ONCE
Status: COMPLETED | OUTPATIENT
Start: 2021-07-29 | End: 2021-07-29

## 2021-07-29 RX ADMIN — IBUPROFEN 800 MG: 400 TABLET, FILM COATED ORAL at 22:34

## 2021-07-29 RX ADMIN — METRONIDAZOLE 500 MG: 500 INJECTION, SOLUTION INTRAVENOUS at 22:37

## 2021-07-29 RX ADMIN — ACETAMINOPHEN 650 MG: 325 TABLET ORAL at 22:34

## 2021-07-29 RX ADMIN — CEFTRIAXONE 1000 MG: 1 INJECTION, POWDER, FOR SOLUTION INTRAMUSCULAR; INTRAVENOUS at 22:36

## 2021-07-29 RX ADMIN — IOPAMIDOL 80 ML: 755 INJECTION, SOLUTION INTRAVENOUS at 20:15

## 2021-07-29 ASSESSMENT — PAIN SCALES - GENERAL
PAINLEVEL_OUTOF10: 9
PAINLEVEL_OUTOF10: 9

## 2021-07-29 ASSESSMENT — PAIN DESCRIPTION - FREQUENCY: FREQUENCY: INTERMITTENT

## 2021-07-29 ASSESSMENT — PAIN DESCRIPTION - LOCATION: LOCATION: ABDOMEN

## 2021-07-29 ASSESSMENT — PAIN DESCRIPTION - PROGRESSION: CLINICAL_PROGRESSION: NOT CHANGED

## 2021-07-29 ASSESSMENT — PAIN DESCRIPTION - ONSET: ONSET: ON-GOING

## 2021-07-29 ASSESSMENT — PAIN DESCRIPTION - PAIN TYPE: TYPE: ACUTE PAIN

## 2021-07-29 ASSESSMENT — PAIN DESCRIPTION - DESCRIPTORS: DESCRIPTORS: CRAMPING;DISCOMFORT

## 2021-07-29 NOTE — ED TRIAGE NOTES
Pt came into the ED with c/o abd pain that started approx 2 weeks ago. Pt states that she was on Sulfa, but PCP switched to Cipro. she finished her Cipro for a UTI and the pain has come back as well as the urgency.

## 2021-07-29 NOTE — ED PROVIDER NOTES
[address] [facility]          ATTENDING PHYSICIAN NOTE       Date of evaluation: 7/29/2021    Chief Complaint     Abdominal Pain and Urinary Tract Infection      History of Present Illness     Katie Servin is a 62 y.o. female who presents to the emergency department with lower abdominal pain for the past several days. Patient ports that she was initially seen on 15 July by her primary care provider with suprapubic pain, had a urinalysis done in office that was suspicious for urinary tract infection and completed a 5-day course of ciprofloxacin. She was initially feeling better but over the past several days has started having increased pain, pain with defecation, and urinary urgency. She denies dysuria, hematuria, fevers, and flank pain. Review of Systems     Review of Systems    Pertinent positive and negative findings as documented in the HPI, otherwise other systems were reviewed and were negative. 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 She has a past surgical history that includes Ectopic pregnancy surgery; laparoscopy; and Total knee arthroplasty (Left, 01/2021). Her family history includes Cancer in her maternal grandmother; Hemochromatosis in her brother, father, mother, and sister; Hypertension in her brother, father, and sister; Thyroid Disease in her mother. She reports that she quit smoking about 22 years ago. She has a 2.50 pack-year smoking history. She has never used smokeless tobacco. She reports current alcohol use. Medications     Previous Medications    ALPRAZOLAM (XANAX) 0.5 MG TABLET    TK 1 T PO  TID PRN FOR SLEEP OR ANXIETY    ALPRAZOLAM (XANAX) 0.5 MG TABLET    Take 1 tablet by mouth 3 times daily as needed for Sleep or Anxiety for up to 30 days. BACILLUS COAGULANS-INULIN (PROBIOTIC) 1-250 BILLION-MG CAPS    Take 1 capsule by mouth daily    GABAPENTIN (NEURONTIN) 100 MG CAPSULE    TAKE 1 TO 2 CAPSULES BY MOUTH THREE TIMES DAILY AS NEEDED    KETOCONAZOLE (NIZORAL) 2 % CREAM        LISINOPRIL (PRINIVIL;ZESTRIL) 10 MG TABLET    TAKE 1 TABLET BY MOUTH EVERY DAY    MAGNESIUM GLYCINATE PLUS PO    Take 400 mg by mouth daily    MULTIPLE VITAMINS-MINERALS (THERAPEUTIC MULTIVITAMIN-MINERALS) TABLET    Take 1 tablet by mouth daily    ONDANSETRON (ZOFRAN ODT) 4 MG DISINTEGRATING TABLET    Take 1 tablet by mouth every 8 hours as needed for Nausea or Vomiting    PSYLLIUM (METAMUCIL FIBER PO)    Take by mouth    VITAMIN B-12 (CYANOCOBALAMIN) 1000 MCG TABLET    Take 1,000 mcg by mouth daily    VITAMIN D (CHOLECALCIFEROL) 1000 UNITS CAPS CAPSULE    Take 1,000 Units by mouth daily. Allergies     She is allergic to dust mite extract and macrobid [nitrofurantoin macrocrystal]. Physical Exam     INITIAL VITALS: BP: 137/82, Temp: 98.6 °F (37 °C), Pulse: 81, Resp: 18, SpO2: 100 %   Physical Exam    General: Well developed and well nourished. No acute distress. HEENT: NCAT, PERRL, moist mucous membranes  Neck: Trachea midline, neck supple with FROM  Heart: Regular rate and rhythm. No murmurs, gallops, or rubs appreciated. Lungs: Clear to auscultation in all fields bilaterally. Normal effort. Abd: Mild to moderate left lower quadrant and suprapubic tenderness palpation without guarding or rebound. MSK: No obvious deformities. Range of motion grossly intact. Extremities: No cyanosis or edema. Peripheral pulses intact. Skin: No rashes, abrasions, contusions, or lacerations noted. Neuro: Alert and oriented, moves all extremities spontaneously. No gross motor or sensory deficits. Psych: Mood and affect appropriate.  Thought process and content normal.    Diagnostic Results     EKG   None    RADIOLOGY:  CT ABDOMEN PELVIS W IV CONTRAST Additional Contrast? None   Final Result      Diverticulosis of the sigmoid colon. Localized wall thickening and pericolonic soft tissue stranding. Scattered small multiple foci of extraluminal air in the pelvis, consistent with regional microperforation. No other acute findings in the abdomen or pelvis. Findings were personally called to Nasim vu in the ED at 2145 hours on 7/29/2021.           LABS:   Results for orders placed or performed during the hospital encounter of 07/29/21   Urinalysis Reflex to Culture    Specimen: Urine, clean catch   Result Value Ref Range    Color, UA Yellow Straw/Yellow    Clarity, UA Clear Clear    Glucose, Ur Negative Negative mg/dL    Bilirubin Urine SMALL (A) Negative    Ketones, Urine 15 (A) Negative mg/dL    Specific Gravity, UA 1.025 1.005 - 1.030    Blood, Urine TRACE-LYSED (A) Negative    pH, UA 6.0 5.0 - 8.0    Protein, UA Negative Negative mg/dL    Urobilinogen, Urine 0.2 <2.0 E.U./dL    Nitrite, Urine Negative Negative    Leukocyte Esterase, Urine TRACE (A) Negative    Microscopic Examination YES     Urine Type Voided     Urine Reflex to Culture Not Indicated    Microscopic Urinalysis   Result Value Ref Range    Mucus, UA 2+ (A) None Seen /LPF    WBC, UA 6-9 (A) 0 - 5 /HPF    RBC, UA 3-4 0 - 4 /HPF    Epithelial Cells, UA 6-10 (A) 0 - 5 /HPF    Renal Epithelial, UA 2-5 (A) 0 - 1 /HPF    Bacteria, UA 3+ (A) None Seen /HPF   CBC Auto Differential   Result Value Ref Range    WBC 15.4 (H) 4.0 - 11.0 K/uL    RBC 3.33 (L) 4.00 - 5.20 M/uL    Hemoglobin 12.3 12.0 - 16.0 g/dL    Hematocrit 35.9 (L) 36.0 - 48.0 %    .8 (H) 80.0 - 100.0 fL    MCH 37.1 (H) 26.0 - 34.0 pg    MCHC 34.4 31.0 - 36.0 g/dL    RDW 13.1 12.4 - 15.4 %    Platelets 625 052 - 466 K/uL    MPV 7.9 5.0 - 10.5 fL    Neutrophils % 90.0 %    Lymphocytes % 5.0 %    Monocytes % 4.5 %    Eosinophils % 0.2 %    Basophils % 0.3 %    Neutrophils Absolute 13.8 (H) 1.7 - 7.7 K/uL    Lymphocytes Absolute 0.8 (L) 1.0 - 5.1 K/uL Monocytes Absolute 0.7 0.0 - 1.3 K/uL    Eosinophils Absolute 0.0 0.0 - 0.6 K/uL    Basophils Absolute 0.1 0.0 - 0.2 K/uL   Basic Metabolic Panel w/ Reflex to MG   Result Value Ref Range    Sodium 138 136 - 145 mmol/L    Potassium reflex Magnesium 4.1 3.5 - 5.1 mmol/L    Chloride 103 99 - 110 mmol/L    CO2 25 21 - 32 mmol/L    Anion Gap 10 3 - 16    Glucose 130 (H) 70 - 99 mg/dL    BUN 6 (L) 7 - 20 mg/dL    CREATININE <0.5 (L) 0.6 - 1.2 mg/dL    GFR Non-African American >60 >60    GFR African American >60 >60    Calcium 9.0 8.3 - 10.6 mg/dL       ED BEDSIDE ULTRASOUND:  None    RECENT VITALS:  BP: 137/82,Temp: 98.6 °F (37 °C), Pulse: 81, Resp: 18, SpO2: 93 %     Procedures     None    ED Course     Nursing Notes, Past Medical Hx, Past Surgical Hx, Social Hx,Allergies, and Family Hx were reviewed. patient was given the following medications:  Orders Placed This Encounter   Medications    iopamidol (ISOVUE-370) 76 % injection 80 mL    ibuprofen (ADVIL;MOTRIN) tablet 800 mg    acetaminophen (TYLENOL) tablet 650 mg    metronidazole (FLAGYL) 500 mg in NaCl 100 mL IVPB premix     Order Specific Question:   Antimicrobial Indications     Answer:   Intra-Abdominal Infection    cefTRIAXone (ROCEPHIN) 1000 mg IVPB in 50 mL D5W minibag     Order Specific Question:   Antimicrobial Indications     Answer:   Intra-Abdominal Infection       CONSULTS:  IP CONSULT TO HOSPITALIST    MEDICAL DECISIONMAKING / ASSESSMENT / Gloria Fili is a 62 y.o. female presenting with lower abdominal pain. On presentation the patient was afebrile, hemodynamically stable, and in no acute distress. Her lower abdomen was mildly tender to palpation but she had no peritoneal signs. Urinalysis did not show signs of overt infection but she did have a leukocytosis of 15.4. Renal function electrolytes were within normal limits. CT of the abdomen with IV contrast was obtained that showed diverticulitis with microperforations.  I discussed this finding with the patient and her  at bedside and recommendation to initiate IV antibiotics and admit to the hospital for close observation. They were agreeable with the plan. I then discussed the case with the hospitalist and she was accepted for admission. She will continue to be monitored closely in the ED until she is taken to her new treatment location. Clinical Impression     1. Diverticulitis of colon        Disposition     PATIENT REFERRED TO:  No follow-up provider specified.     DISCHARGE MEDICATIONS:  New Prescriptions    No medications on file       DISPOSITION Decision To Admit 07/29/2021 10:23:17 PM         Colton Noriega MD  07/29/21 9831

## 2021-07-30 LAB
ALBUMIN SERPL-MCNC: 3.8 G/DL (ref 3.4–5)
ALP BLD-CCNC: 75 U/L (ref 40–129)
ALT SERPL-CCNC: 20 U/L (ref 10–40)
ANION GAP SERPL CALCULATED.3IONS-SCNC: 13 MMOL/L (ref 3–16)
AST SERPL-CCNC: 21 U/L (ref 15–37)
BILIRUB SERPL-MCNC: 1 MG/DL (ref 0–1)
BILIRUBIN DIRECT: <0.2 MG/DL (ref 0–0.3)
BILIRUBIN, INDIRECT: NORMAL MG/DL (ref 0–1)
BUN BLDV-MCNC: 6 MG/DL (ref 7–20)
C DIFF TOXIN/ANTIGEN: NORMAL
CALCIUM SERPL-MCNC: 9 MG/DL (ref 8.3–10.6)
CHLORIDE BLD-SCNC: 104 MMOL/L (ref 99–110)
CO2: 21 MMOL/L (ref 21–32)
CREAT SERPL-MCNC: <0.5 MG/DL (ref 0.6–1.2)
GFR AFRICAN AMERICAN: >60
GFR NON-AFRICAN AMERICAN: >60
GLUCOSE BLD-MCNC: 120 MG/DL (ref 70–99)
HCT VFR BLD CALC: 35.9 % (ref 36–48)
HEMOGLOBIN: 12.3 G/DL (ref 12–16)
MAGNESIUM: 1.7 MG/DL (ref 1.8–2.4)
MCH RBC QN AUTO: 36.3 PG (ref 26–34)
MCHC RBC AUTO-ENTMCNC: 34.4 G/DL (ref 31–36)
MCV RBC AUTO: 105.4 FL (ref 80–100)
PDW BLD-RTO: 12.7 % (ref 12.4–15.4)
PLATELET # BLD: 353 K/UL (ref 135–450)
PMV BLD AUTO: 8.4 FL (ref 5–10.5)
POTASSIUM REFLEX MAGNESIUM: 3.3 MMOL/L (ref 3.5–5.1)
RBC # BLD: 3.4 M/UL (ref 4–5.2)
SARS-COV-2, NAAT: NOT DETECTED
SODIUM BLD-SCNC: 138 MMOL/L (ref 136–145)
TOTAL PROTEIN: 6.9 G/DL (ref 6.4–8.2)
WBC # BLD: 14.5 K/UL (ref 4–11)

## 2021-07-30 PROCEDURE — 83735 ASSAY OF MAGNESIUM: CPT

## 2021-07-30 PROCEDURE — 6370000000 HC RX 637 (ALT 250 FOR IP): Performed by: INTERNAL MEDICINE

## 2021-07-30 PROCEDURE — 85027 COMPLETE CBC AUTOMATED: CPT

## 2021-07-30 PROCEDURE — 87449 NOS EACH ORGANISM AG IA: CPT

## 2021-07-30 PROCEDURE — 6360000002 HC RX W HCPCS: Performed by: INTERNAL MEDICINE

## 2021-07-30 PROCEDURE — 2580000003 HC RX 258: Performed by: INTERNAL MEDICINE

## 2021-07-30 PROCEDURE — 87324 CLOSTRIDIUM AG IA: CPT

## 2021-07-30 PROCEDURE — 99223 1ST HOSP IP/OBS HIGH 75: CPT | Performed by: SURGERY

## 2021-07-30 PROCEDURE — 80048 BASIC METABOLIC PNL TOTAL CA: CPT

## 2021-07-30 PROCEDURE — 2500000003 HC RX 250 WO HCPCS: Performed by: INTERNAL MEDICINE

## 2021-07-30 PROCEDURE — 1200000000 HC SEMI PRIVATE

## 2021-07-30 PROCEDURE — 36415 COLL VENOUS BLD VENIPUNCTURE: CPT

## 2021-07-30 RX ORDER — ACETAMINOPHEN 650 MG/1
650 SUPPOSITORY RECTAL EVERY 6 HOURS PRN
Status: DISCONTINUED | OUTPATIENT
Start: 2021-07-30 | End: 2021-08-02 | Stop reason: HOSPADM

## 2021-07-30 RX ORDER — MAGNESIUM SULFATE IN WATER 40 MG/ML
2000 INJECTION, SOLUTION INTRAVENOUS ONCE
Status: COMPLETED | OUTPATIENT
Start: 2021-07-30 | End: 2021-07-30

## 2021-07-30 RX ORDER — LORAZEPAM 1 MG/1
3 TABLET ORAL
Status: DISCONTINUED | OUTPATIENT
Start: 2021-07-30 | End: 2021-08-02 | Stop reason: HOSPADM

## 2021-07-30 RX ORDER — ALPRAZOLAM 0.5 MG/1
0.5 TABLET ORAL 3 TIMES DAILY PRN
Status: DISCONTINUED | OUTPATIENT
Start: 2021-07-30 | End: 2021-08-02 | Stop reason: HOSPADM

## 2021-07-30 RX ORDER — POTASSIUM CHLORIDE 7.45 MG/ML
10 INJECTION INTRAVENOUS
Status: COMPLETED | OUTPATIENT
Start: 2021-07-30 | End: 2021-07-30

## 2021-07-30 RX ORDER — ACETAMINOPHEN 325 MG/1
650 TABLET ORAL EVERY 6 HOURS PRN
Status: DISCONTINUED | OUTPATIENT
Start: 2021-07-30 | End: 2021-08-02 | Stop reason: HOSPADM

## 2021-07-30 RX ORDER — MORPHINE SULFATE 2 MG/ML
2 INJECTION, SOLUTION INTRAMUSCULAR; INTRAVENOUS EVERY 4 HOURS PRN
Status: DISCONTINUED | OUTPATIENT
Start: 2021-07-30 | End: 2021-07-30

## 2021-07-30 RX ORDER — SODIUM CHLORIDE, SODIUM LACTATE, POTASSIUM CHLORIDE, CALCIUM CHLORIDE 600; 310; 30; 20 MG/100ML; MG/100ML; MG/100ML; MG/100ML
INJECTION, SOLUTION INTRAVENOUS CONTINUOUS
Status: DISCONTINUED | OUTPATIENT
Start: 2021-07-30 | End: 2021-07-31

## 2021-07-30 RX ORDER — SODIUM CHLORIDE 0.9 % (FLUSH) 0.9 %
5-40 SYRINGE (ML) INJECTION PRN
Status: DISCONTINUED | OUTPATIENT
Start: 2021-07-30 | End: 2021-08-02 | Stop reason: HOSPADM

## 2021-07-30 RX ORDER — LORAZEPAM 2 MG/ML
3 INJECTION INTRAMUSCULAR
Status: DISCONTINUED | OUTPATIENT
Start: 2021-07-30 | End: 2021-08-02 | Stop reason: HOSPADM

## 2021-07-30 RX ORDER — LORAZEPAM 2 MG/ML
2 INJECTION INTRAMUSCULAR
Status: DISCONTINUED | OUTPATIENT
Start: 2021-07-30 | End: 2021-08-02 | Stop reason: HOSPADM

## 2021-07-30 RX ORDER — ONDANSETRON 2 MG/ML
4 INJECTION INTRAMUSCULAR; INTRAVENOUS EVERY 6 HOURS PRN
Status: DISCONTINUED | OUTPATIENT
Start: 2021-07-30 | End: 2021-08-02 | Stop reason: HOSPADM

## 2021-07-30 RX ORDER — LORAZEPAM 1 MG/1
4 TABLET ORAL
Status: DISCONTINUED | OUTPATIENT
Start: 2021-07-30 | End: 2021-08-02 | Stop reason: HOSPADM

## 2021-07-30 RX ORDER — POLYETHYLENE GLYCOL 3350 17 G/17G
17 POWDER, FOR SOLUTION ORAL DAILY PRN
Status: DISCONTINUED | OUTPATIENT
Start: 2021-07-30 | End: 2021-08-02 | Stop reason: HOSPADM

## 2021-07-30 RX ORDER — SODIUM CHLORIDE 9 MG/ML
25 INJECTION, SOLUTION INTRAVENOUS PRN
Status: DISCONTINUED | OUTPATIENT
Start: 2021-07-30 | End: 2021-08-02 | Stop reason: HOSPADM

## 2021-07-30 RX ORDER — SODIUM CHLORIDE 0.9 % (FLUSH) 0.9 %
5-40 SYRINGE (ML) INJECTION EVERY 12 HOURS SCHEDULED
Status: DISCONTINUED | OUTPATIENT
Start: 2021-07-30 | End: 2021-08-02 | Stop reason: HOSPADM

## 2021-07-30 RX ORDER — LORAZEPAM 2 MG/ML
1 INJECTION INTRAMUSCULAR
Status: DISCONTINUED | OUTPATIENT
Start: 2021-07-30 | End: 2021-08-02 | Stop reason: HOSPADM

## 2021-07-30 RX ORDER — ONDANSETRON 4 MG/1
4 TABLET, ORALLY DISINTEGRATING ORAL EVERY 8 HOURS PRN
Status: DISCONTINUED | OUTPATIENT
Start: 2021-07-30 | End: 2021-08-02 | Stop reason: HOSPADM

## 2021-07-30 RX ORDER — LORAZEPAM 1 MG/1
2 TABLET ORAL
Status: DISCONTINUED | OUTPATIENT
Start: 2021-07-30 | End: 2021-08-02 | Stop reason: HOSPADM

## 2021-07-30 RX ORDER — LORAZEPAM 1 MG/1
1 TABLET ORAL
Status: DISCONTINUED | OUTPATIENT
Start: 2021-07-30 | End: 2021-08-02 | Stop reason: HOSPADM

## 2021-07-30 RX ORDER — LORAZEPAM 2 MG/ML
4 INJECTION INTRAMUSCULAR
Status: DISCONTINUED | OUTPATIENT
Start: 2021-07-30 | End: 2021-08-02 | Stop reason: HOSPADM

## 2021-07-30 RX ORDER — SODIUM CHLORIDE 0.9 % (FLUSH) 0.9 %
5-40 SYRINGE (ML) INJECTION EVERY 12 HOURS SCHEDULED
Status: DISCONTINUED | OUTPATIENT
Start: 2021-07-30 | End: 2021-07-31

## 2021-07-30 RX ORDER — MORPHINE SULFATE 2 MG/ML
2 INJECTION, SOLUTION INTRAMUSCULAR; INTRAVENOUS ONCE
Status: COMPLETED | OUTPATIENT
Start: 2021-07-30 | End: 2021-07-30

## 2021-07-30 RX ORDER — MORPHINE SULFATE 2 MG/ML
2 INJECTION, SOLUTION INTRAMUSCULAR; INTRAVENOUS EVERY 4 HOURS PRN
Status: DISCONTINUED | OUTPATIENT
Start: 2021-07-30 | End: 2021-07-31

## 2021-07-30 RX ORDER — MORPHINE SULFATE 4 MG/ML
4 INJECTION, SOLUTION INTRAMUSCULAR; INTRAVENOUS EVERY 4 HOURS PRN
Status: DISCONTINUED | OUTPATIENT
Start: 2021-07-30 | End: 2021-08-02 | Stop reason: HOSPADM

## 2021-07-30 RX ADMIN — ACETAMINOPHEN 650 MG: 325 TABLET ORAL at 17:44

## 2021-07-30 RX ADMIN — SODIUM CHLORIDE, POTASSIUM CHLORIDE, SODIUM LACTATE AND CALCIUM CHLORIDE: 600; 310; 30; 20 INJECTION, SOLUTION INTRAVENOUS at 01:08

## 2021-07-30 RX ADMIN — POTASSIUM CHLORIDE 10 MEQ: 10 INJECTION, SOLUTION INTRAVENOUS at 17:35

## 2021-07-30 RX ADMIN — MORPHINE SULFATE 2 MG: 2 INJECTION, SOLUTION INTRAMUSCULAR; INTRAVENOUS at 13:33

## 2021-07-30 RX ADMIN — ONDANSETRON 4 MG: 2 INJECTION INTRAMUSCULAR; INTRAVENOUS at 08:49

## 2021-07-30 RX ADMIN — ALPRAZOLAM 0.5 MG: 0.5 TABLET ORAL at 01:08

## 2021-07-30 RX ADMIN — ACETAMINOPHEN 650 MG: 325 TABLET ORAL at 08:39

## 2021-07-30 RX ADMIN — METRONIDAZOLE 500 MG: 500 INJECTION, SOLUTION INTRAVENOUS at 06:43

## 2021-07-30 RX ADMIN — METRONIDAZOLE 500 MG: 500 INJECTION, SOLUTION INTRAVENOUS at 14:31

## 2021-07-30 RX ADMIN — POTASSIUM CHLORIDE 10 MEQ: 10 INJECTION, SOLUTION INTRAVENOUS at 18:37

## 2021-07-30 RX ADMIN — MAGNESIUM SULFATE HEPTAHYDRATE 2000 MG: 2 INJECTION, SOLUTION INTRAVENOUS at 16:31

## 2021-07-30 RX ADMIN — SODIUM CHLORIDE, POTASSIUM CHLORIDE, SODIUM LACTATE AND CALCIUM CHLORIDE: 600; 310; 30; 20 INJECTION, SOLUTION INTRAVENOUS at 12:24

## 2021-07-30 RX ADMIN — ONDANSETRON 4 MG: 2 INJECTION INTRAMUSCULAR; INTRAVENOUS at 15:05

## 2021-07-30 RX ADMIN — MORPHINE SULFATE 2 MG: 2 INJECTION, SOLUTION INTRAMUSCULAR; INTRAVENOUS at 15:06

## 2021-07-30 RX ADMIN — SODIUM CHLORIDE, POTASSIUM CHLORIDE, SODIUM LACTATE AND CALCIUM CHLORIDE: 600; 310; 30; 20 INJECTION, SOLUTION INTRAVENOUS at 22:48

## 2021-07-30 RX ADMIN — Medication 5 ML: at 21:43

## 2021-07-30 RX ADMIN — ENOXAPARIN SODIUM 40 MG: 40 INJECTION SUBCUTANEOUS at 08:39

## 2021-07-30 RX ADMIN — METRONIDAZOLE 500 MG: 500 INJECTION, SOLUTION INTRAVENOUS at 22:49

## 2021-07-30 RX ADMIN — CEFTRIAXONE 1000 MG: 1 INJECTION, POWDER, FOR SOLUTION INTRAMUSCULAR; INTRAVENOUS at 21:43

## 2021-07-30 RX ADMIN — POTASSIUM CHLORIDE 10 MEQ: 10 INJECTION, SOLUTION INTRAVENOUS at 16:31

## 2021-07-30 RX ADMIN — MORPHINE SULFATE 4 MG: 4 INJECTION INTRAVENOUS at 21:43

## 2021-07-30 ASSESSMENT — PAIN DESCRIPTION - DESCRIPTORS
DESCRIPTORS: CONSTANT;SHOOTING
DESCRIPTORS: ACHING;DISCOMFORT
DESCRIPTORS: STABBING;SHARP
DESCRIPTORS: ACHING
DESCRIPTORS: ACHING

## 2021-07-30 ASSESSMENT — PAIN DESCRIPTION - FREQUENCY
FREQUENCY: CONTINUOUS
FREQUENCY: INTERMITTENT

## 2021-07-30 ASSESSMENT — PAIN DESCRIPTION - PROGRESSION
CLINICAL_PROGRESSION: GRADUALLY WORSENING
CLINICAL_PROGRESSION: NOT CHANGED
CLINICAL_PROGRESSION: NOT CHANGED
CLINICAL_PROGRESSION: GRADUALLY WORSENING
CLINICAL_PROGRESSION: NOT CHANGED
CLINICAL_PROGRESSION: GRADUALLY WORSENING
CLINICAL_PROGRESSION: NOT CHANGED

## 2021-07-30 ASSESSMENT — PAIN DESCRIPTION - ORIENTATION
ORIENTATION: LOWER;LEFT;RIGHT
ORIENTATION: LOWER

## 2021-07-30 ASSESSMENT — PAIN DESCRIPTION - PAIN TYPE
TYPE: ACUTE PAIN

## 2021-07-30 ASSESSMENT — PAIN DESCRIPTION - LOCATION
LOCATION: HEAD
LOCATION: ABDOMEN
LOCATION: ABDOMEN
LOCATION: HEAD;ABDOMEN
LOCATION: ABDOMEN

## 2021-07-30 ASSESSMENT — PAIN SCALES - GENERAL
PAINLEVEL_OUTOF10: 6
PAINLEVEL_OUTOF10: 8
PAINLEVEL_OUTOF10: 9
PAINLEVEL_OUTOF10: 1
PAINLEVEL_OUTOF10: 8
PAINLEVEL_OUTOF10: 3
PAINLEVEL_OUTOF10: 6
PAINLEVEL_OUTOF10: 3
PAINLEVEL_OUTOF10: 9

## 2021-07-30 ASSESSMENT — PAIN DESCRIPTION - ONSET
ONSET: ON-GOING
ONSET: ON-GOING
ONSET: PROGRESSIVE
ONSET: ON-GOING

## 2021-07-30 ASSESSMENT — PAIN - FUNCTIONAL ASSESSMENT
PAIN_FUNCTIONAL_ASSESSMENT: ACTIVITIES ARE NOT PREVENTED

## 2021-07-30 NOTE — PROGRESS NOTES
HOSPITALISTS Progress Note    7/30/2021 12:24 PM    Patient Information:  Dannielle Seaman is a 62 y.o. female [id number]  PCP:  Royal Levon MD (Tel: [phone] )    Chief complaint:  Abdominal pain        History of Present Illness:   62 y.o. female with HTN, hemochromatosis,  Diverticulosis and previous diverticulitis, who presented with 2-week symptoms of abdominal pain. She was seen by her PCP for urinary symptoms, and treated for UTI with some improvement in urinary symptoms. Urine culture did grow E. coli on 7/15/2021. She however,, over the past 2 days, noted persistent lower abdominal pain with nausea. . she also noted diarrhea. No blood in the stools. She has a hx of diverticulitis about 4 years ago, treated with antibiotics. In the ED, she was afebrile. Normal vitals. Labs showed leucocytosis of 15.4. She was admitted for further eval and management. Interval History  No new complaints  Had fever 37.9 early this AM    Still abd pain. No N/V        REVIEW OF SYSTEMS:   All other ROS negative except mentioned in [place]      Medications: Reviewed        Allergies: Allergies   Allergen Reactions    Dust Mite Extract     Macrobid [Nitrofurantoin Macrocrystal] Rash          Physical Exam:  /81   Pulse 78   Temp 98.7 °F (37.1 °C) (Oral)   Resp 16   Ht 5' 8\" (1.727 m)   Wt 148 lb 13 oz (67.5 kg)   SpO2 98%   BMI 22.63 kg/m²     General appearance:  Appears comfortable. Well nourished  Eyes: Sclera clear, pupils equal  ENT: Dry mucus membranes, no thrush. Trachea midline. Cardiovascular: Regular rhythm, normal S1, S2. No murmur, gallop, rub. No edema in lower extremities  Respiratory: Clear to auscultation bilaterally, no wheeze, good inspiratory effort  Abdomen: Soft, tender in the RLQ, LLQ and the suprapubic area.    Musculoskeletal: No cyanosis in digits, neck supple  Neurology: Cranial nerves grossly intact. Alert and oriented in time, place and person. No speech or motor deficits  Psychiatry: Appropriate affect. Not agitated, does seem to have a little bit of pressured speech  Skin: Warm, dry, normal turgor, no rash  Brisk capillary refill, peripheral pulses palpable       Labs:  CBC:   Lab Results   Component Value Date    WBC 14.5 07/30/2021    RBC 3.40 07/30/2021    HGB 12.3 07/30/2021    HCT 35.9 07/30/2021    .4 07/30/2021    MCH 36.3 07/30/2021    MCHC 34.4 07/30/2021    RDW 12.7 07/30/2021     07/30/2021    MPV 8.4 07/30/2021     BMP:    Lab Results   Component Value Date     07/30/2021    K 3.3 07/30/2021     07/30/2021    CO2 21 07/30/2021    BUN 6 07/30/2021    CREATININE <0.5 07/30/2021    CALCIUM 9.0 07/30/2021    GFRAA >60 07/30/2021    LABGLOM >60 07/30/2021    LABGLOM 101 12/09/2017    GLUCOSE 120 07/30/2021     CT ABDOMEN PELVIS W IV CONTRAST Additional Contrast? None   Final Result      Diverticulosis of the sigmoid colon. Localized wall thickening and pericolonic soft tissue stranding. Scattered small multiple foci of extraluminal air in the pelvis, consistent with regional microperforation. No other acute findings in the abdomen or pelvis. Findings were personally called to Alejo vu in the ED at 2145 hours on 7/29/2021. Assessment/Plan:   62 y.o. female with HTN, hemochromatosis,  Diverticulosis and previous diverticulitis, who presented with 2-week symptoms of abdominal pain. 1. Recurrent Diverticulitis complicated with microperforation. POA  - Presented with fever 37.9, and leucocytosis  - Second episode of diverticulitis  - Had a \"negative colonoscopy\" last year. - No features of peritoneal irritation  - Continue serial abdominal exams  - Keep n.p.o. except ice chips and meds.  Possible clears tomorrow.   -  Rocephin and Flagyl, IV fluids, as needed opiates IV for pain control  - Surgery consulted, juwan as the divertic. is second episode to eval candidacy for elective surgery. 2. Alcohol abuse  3. Macrocytosis without anemia  Monitor for alcohol withdrawal   CIWA scale,  Normal B12 folate in 12/2021  Replenish electrolytes      4. Recent E Coli UTI  Was treated with bactrim DS BID x 7d  Cx/sens: Pan-sensitive E Coli  UA on admission had a lot of epithelial cells      5. Hereditary hemochromatosis ([facility] [address]  Needs f/u with heme/onc  Per review, # as high as 900 on ferritin  Signif family hx, and states HFE gene positive  Was being treated with phlebotomy  Wants 2nd opinion, and was referred to [facility] heme/onc for eval by PCP          6. Essential hypertension  Hold lisinopril for now      7. Primary insomnia/Anxiety  Chronic sx, mildl worse related to stressors  Melatonin 5-10mg qhs  Was placed on ALPRAZolam 0.5 MG tablet; Take 1 tablet by mouth 3 times daily as needed for Sleep or Anxiety per PCP and refered to psychology for evaluation and tx  Also On cymbalta        DVT prophylaxis Lovenox  Code status full code  Diet n.p.o.  IV access peripheral  Simeon Catheter no      Disposition: Pending progress.       Eric Daniels MD    7/30/2021 12:24 PM

## 2021-07-30 NOTE — PROGRESS NOTES
Admitted patient to room 6326 from ED with dx: diverticulitis. Patient is alert and oriented, respirations easy and unlabored. VSS. NAD. Oriented to room, call light, tv, phone and dietary services. Bed in lowest position and locked. Exit alarms in place. Non slip socks on. ID bracelet on and correct per patient verbally reporting name and date of birth. Call light and needed items in reach.

## 2021-07-30 NOTE — PLAN OF CARE
Problem: Nutrition  Goal: Optimal nutrition therapy  Note: Nutrition Problem #1: Inadequate oral intake  Intervention: Food and/or Nutrient Delivery: Continue NPO  Nutritional Goals: Pt will tolerate diet advancement to consume >50% of meals and supplements throughout admission.

## 2021-07-30 NOTE — H&P
HOSPITALISTS HISTORY AND PHYSICAL    7/29/2021 10:43 PM    Patient Information:  Fiordaliza Tracey is a 62 y.o. female [id number]  PCP:  Bela Ochoa MD (Tel: [phone] )    Chief complaint:    Chief Complaint   Patient presents with    Abdominal Pain    Urinary Tract Infection        Problem List  Diverticulitis with microperforation  Alcohol use  Macrocytosis  History of hypertension    Assessment/Plan:   Diverticulitis with microperforation  Second episode  Does seem to have peritoneal irritation  Keep n.p.o. except ice chips and meds, general surgery consult, Rocephin and Flagyl, IV fluids, as needed opiates IV for pain control    History of alcohol use  Monitor for alcohol withdrawal CIWA scale, does seem to have elevated MCV with normal B12 folate might be related to alcohol effect  Check LFTs    History of hypertension  Hold lisinopril    Questioning Covid testing, no symptoms  Will order rapid Covid swab      DVT prophylaxis Lovenox  Code status full code  Diet n.p.o.  IV access peripheral  Simeon Catheter no    Admit as inpatient. I anticipate hospitalization spanning more than two midnights for investigation and treatment of the above medically necessary diagnoses. History of Present Illness:  Joshua Sanchez is a 62 y.o. female who presented with 2-week symptoms of abdominal discomfort seen PCP as she was having urinary symptoms to she was treated for UTI with which her symptoms did got better. But then for the last 2 days she is started to have persistent abdominal pain which is lower along with nausea. She did mention some sweats. No blood in the stools. She does mention that she did had an attack of diverticulitis about 4 years ago. History obtained from patient and going over the chart  Old medical records show   Urine culture did grow E. coli on 7/15/2021.   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 Past Surgical History:   has a past surgical history that includes Ectopic pregnancy surgery; laparoscopy; and Total knee arthroplasty (Left, 01/2021). Medications:  No current facility-administered medications on file prior to encounter. Current Outpatient Medications on File Prior to Encounter   Medication Sig Dispense Refill    gabapentin (NEURONTIN) 100 MG capsule TAKE 1 TO 2 CAPSULES BY MOUTH THREE TIMES DAILY AS NEEDED      ketoconazole (NIZORAL) 2 % cream       ALPRAZolam (XANAX) 0.5 MG tablet Take 1 tablet by mouth 3 times daily as needed for Sleep or Anxiety for up to 30 days. 90 tablet 2    ondansetron (ZOFRAN ODT) 4 MG disintegrating tablet Take 1 tablet by mouth every 8 hours as needed for Nausea or Vomiting 20 tablet 0    lisinopril (PRINIVIL;ZESTRIL) 10 MG tablet TAKE 1 TABLET BY MOUTH EVERY DAY 90 tablet 1    Bacillus Coagulans-Inulin (PROBIOTIC) 1-250 BILLION-MG CAPS Take 1 capsule by mouth daily      MAGNESIUM GLYCINATE PLUS PO Take 400 mg by mouth daily      vitamin B-12 (CYANOCOBALAMIN) 1000 MCG tablet Take 1,000 mcg by mouth daily      ALPRAZolam (XANAX) 0.5 MG tablet TK 1 T PO  TID PRN FOR SLEEP OR ANXIETY      Psyllium (METAMUCIL FIBER PO) Take by mouth      Multiple Vitamins-Minerals (THERAPEUTIC MULTIVITAMIN-MINERALS) tablet Take 1 tablet by mouth daily      Vitamin D (CHOLECALCIFEROL) 1000 UNITS CAPS capsule Take 1,000 Units by mouth daily. Allergies:   Allergies   Allergen Reactions    Dust Mite Extract     Macrobid [Nitrofurantoin Macrocrystal] Rash        Social History:  Patient Lives with , Retired   Drinks couple of wine  reports that she quit smoking about 22 years ago. She has a 2.50 pack-year smoking history. She has never used smokeless tobacco. She reports current alcohol use. Family History:  family history includes Cancer in her maternal grandmother; Hemochromatosis in her brother, father, mother, and sister; Hypertension in her brother, father, and sister; Thyroid Disease in her mother. ,     Physical Exam:  /82   Pulse 81   Temp 98.6 °F (37 °C) (Oral)   Resp 18   Ht 5' 8\" (1.727 m)   Wt 155 lb (70.3 kg)   SpO2 93%   BMI 23.57 kg/m²   Lower abdominal tenderness with the rebound tenderness  Mucosa is dry  General appearance:  Appears comfortable. Well nourished  Eyes: Sclera clear, pupils equal  ENT: Dry mucus membranes, no thrush. Trachea midline. Cardiovascular: Regular rhythm, normal S1, S2. No murmur, gallop, rub. No edema in lower extremities  Respiratory: Clear to auscultation bilaterally, no wheeze, good inspiratory effort  Musculoskeletal: No cyanosis in digits, neck supple  Neurology: Cranial nerves grossly intact. Alert and oriented in time, place and person. No speech or motor deficits  Psychiatry: Appropriate affect.  Not agitated, does seem to have a little bit of pressured speech  Skin: Warm, dry, normal turgor, no rash  Brisk capillary refill, peripheral pulses palpable   Labs:  CBC:   Lab Results   Component Value Date    WBC 15.4 07/29/2021    RBC 3.33 07/29/2021    HGB 12.3 07/29/2021    HCT 35.9 07/29/2021    .8 07/29/2021    MCH 37.1 07/29/2021    MCHC 34.4 07/29/2021    RDW 13.1 07/29/2021     07/29/2021    MPV 7.9 07/29/2021     BMP:    Lab Results   Component Value Date     07/29/2021    K 4.1 07/29/2021     07/29/2021    CO2 25 07/29/2021    BUN 6 07/29/2021    CREATININE <0.5 07/29/2021    CALCIUM 9.0 07/29/2021    GFRAA >60 07/29/2021    LABGLOM >60 07/29/2021    LABGLOM 101 12/09/2017    GLUCOSE 130 07/29/2021     CT ABDOMEN PELVIS W IV CONTRAST Additional Contrast? None   Final Result Diverticulosis of the sigmoid colon. Localized wall thickening and pericolonic soft tissue stranding. Scattered small multiple foci of extraluminal air in the pelvis, consistent with regional microperforation. No other acute findings in the abdomen or pelvis. Findings were personally called to Renee vu in the ED at 2145 hours on 7/29/2021. Discussed case  with ER provider    Please note that some part of this chart was generated using Dragon dictation software. Although every effort was made to ensure the accuracy of this automated transcription, some errors in transcription may have occurred inadvertently. If you may need any clarification, please do not hesitate to contact me through [facility].        Carrie Sherwood MD    7/29/2021 10:43 PM

## 2021-07-30 NOTE — CONSULTS
Bariatric Surgery   Resident Consult Note    Reason for Consult: Diverticulitis    History of Present Illness:   Michel Jain is a 62 y.o. female with HTN, known diverticulosis, hemochromatosis, presented to the ED with 2 days of persistent abdominal pain, she reports that she has been having abdominal pain for the past 2 weeks, and was diagnosed with a UTI and treated with a course of outpatient  Ciprofloxacin, however despite treatment pain persisted. She reports failing outpatient abx 3-4 weeks ago. This is her second episode of diverticulitis, last episode was 3 years ago treated outpatient with antibiotics . Last EGD/colonoscopy dec 2020, both normal, only finding diverticulosis. 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 Current Outpatient Medications on File Prior to Encounter   Medication Sig Dispense Refill    Cranberry 125 MG TABS Take 2 tablets by mouth 3 times daily as needed (urinary symptoms)      lisinopril (PRINIVIL;ZESTRIL) 10 MG tablet TAKE 1 TABLET BY MOUTH EVERY DAY 90 tablet 1    Psyllium (METAMUCIL FIBER PO) Take by mouth      gabapentin (NEURONTIN) 100 MG capsule TAKE 1 TO 2 CAPSULES BY MOUTH THREE TIMES DAILY AS NEEDED      ketoconazole (NIZORAL) 2 % cream       ALPRAZolam (XANAX) 0.5 MG tablet Take 1 tablet by mouth 3 times daily as needed for Sleep or Anxiety for up to 30 days.  90 tablet 2    ondansetron (ZOFRAN ODT) 4 MG disintegrating tablet Take 1 tablet by mouth every 8 hours as needed for Nausea or Vomiting 20 tablet 0    Bacillus Coagulans-Inulin (PROBIOTIC) 1-250 BILLION-MG CAPS Take 1 capsule by mouth daily      MAGNESIUM GLYCINATE PLUS PO Take 400 mg by mouth daily      vitamin B-12 (CYANOCOBALAMIN) 1000 MCG tablet Take 1,000 mcg by mouth daily      ALPRAZolam (XANAX) 0.5 MG tablet TK 1 T PO  TID PRN FOR SLEEP OR ANXIETY      Multiple Vitamins-Minerals (THERAPEUTIC MULTIVITAMIN-MINERALS) tablet Take 1 tablet by mouth daily      Vitamin D (CHOLECALCIFEROL) 1000 UNITS CAPS capsule Take 1,000 Units by mouth daily.            Current Meds  ALPRAZolam (XANAX) tablet 0.5 mg, TID PRN  sodium chloride flush 0.9 % injection 5-40 mL, 2 times per day  sodium chloride flush 0.9 % injection 5-40 mL, PRN  0.9 % sodium chloride infusion, PRN  enoxaparin (LOVENOX) injection 40 mg, Daily  ondansetron (ZOFRAN-ODT) disintegrating tablet 4 mg, Q8H PRN   Or  ondansetron (ZOFRAN) injection 4 mg, Q6H PRN  polyethylene glycol (GLYCOLAX) packet 17 g, Daily PRN  acetaminophen (TYLENOL) tablet 650 mg, Q6H PRN   Or  acetaminophen (TYLENOL) suppository 650 mg, Q6H PRN  morphine (PF) injection 2 mg, Q4H PRN  metronidazole (FLAGYL) 500 mg in NaCl 100 mL IVPB premix, Q8H  cefTRIAXone (ROCEPHIN) 1000 mg IVPB in 50 mL D5W minibag, Q24H  lactated ringers infusion, Continuous  sodium chloride flush 0.9 % injection 5-40 mL, 2 times per day  sodium chloride flush 0.9 % injection 5-40 mL, PRN  0.9 % sodium chloride infusion, PRN  LORazepam (ATIVAN) tablet 1 mg, Q1H PRN   Or  LORazepam (ATIVAN) injection 1 mg, Q1H PRN   Or  LORazepam (ATIVAN) tablet 2 mg, Q1H PRN   Or  LORazepam (ATIVAN) injection 2 mg, Q1H PRN   Or  LORazepam (ATIVAN) tablet 3 mg, Q1H PRN   Or  LORazepam (ATIVAN) injection 3 mg, Q1H PRN   Or  LORazepam (ATIVAN) tablet 4 mg, Q1H PRN   Or  LORazepam (ATIVAN) injection 4 mg, Q1H PRN        Family History:   Family History   Problem Relation Age of Onset    Hemochromatosis Brother     Hemochromatosis Sister     Hemochromatosis Mother     Thyroid Disease Mother     Hemochromatosis Father     Hypertension Father     Hypertension Brother     Hypertension Sister     Cancer Maternal Grandmother         bone       Social History:   TOBACCO:   reports that she quit smoking about 22 years ago. She has a 2.50 pack-year smoking history. She has never used smokeless tobacco.  ETOH:   reports current alcohol use. DRUGS:   has no history on file for drug use. ROS: A 14 point review of systems was conducted, significant findings as noted in HPI. All other systems negative. Physical exam:    Vitals:    07/29/21 1830 07/29/21 2358 07/30/21 0015 07/30/21 0415   BP:  129/68 120/74 (!) 91/53   Pulse:  74 74 72   Resp:  16 16 16   Temp:  100 °F (37.8 °C) 98.7 °F (37.1 °C) 98.4 °F (36.9 °C)   TempSrc:  Oral Oral Oral   SpO2: 93% 97% 97% 98%   Weight:   148 lb 13 oz (67.5 kg)    Height:   5' 8\" (1.727 m)        General appearance: alert, no acute distress, grooming appropriate  Eyes: no gross deficits  Neck: trachea midline  Chest/Lungs: normal effort, no accessory muscle use, on RA  Cardiovascular: RRR  Abdomen: soft,tender LLQ, non-distended, no guarding/rigidity  Skin: warm and dry, no rashes  Extremities: no edema, no cyanosis  Neuro: A&Ox3, no focal deficits, sensation intact    Labs:    CBC:   Recent Labs     07/29/21 1811   WBC 15.4*   HGB 12.3   HCT 35.9*   .8*        BMP:   Recent Labs     07/29/21 1811      K 4.1      CO2 25   BUN 6*   CREATININE <0.5*     PT/INR: No results for input(s): PROTIME, INR in the last 72 hours. APTT: No results for input(s): APTT in the last 72 hours.   Liver Profile:  Lab Results   Component Value Date    AST 21 07/29/2021    ALT 20 07/29/2021    BILIDIR <0.2 07/29/2021    BILITOT 1.0 07/29/2021    ALKPHOS 75 07/29/2021     Lab Results   Component Value Date    CHOL 223 11/24/2020    HDL 83 11/24/2020    TRIG 86 11/24/2020

## 2021-07-30 NOTE — PROGRESS NOTES
Physician Progress Note      PATIENT:               Usha Mc  CSN #:                  [id number]  :                       1960  ADMIT DATE:       2021 4:39 PM  [address] [place] DATE:  RESPONDING  PROVIDER #:        Constantino Brunner MD          QUERY TEXT:    Patient admitted with diverticulosis. Noted documentation of UTI in ED note. In order to support the diagnosis of ***, please include additional clinical   indicators in your documentation. Or please document if the diagnosis of ***   has been ruled out after further study. The medical record reflects the following:  Risk Factors: Diverticulosis  Clinical Indicators: Per ED report: ports that she was initially seen on 15   July by her primary care provider with suprapubic pain, had a urinalysis done   in office that was suspicious for urinary tract infection and completed a   5-day course of ciprofloxacin. She was initially feeling better but over the   past several days has started having increased pain, pain with defecation, and   urinary urgency.   Trace Leukocytes, 2+ mucous, WBC 6-9  Treatment: Rocephin, IV fluids, UA  Options provided:  -- This patient has a UTI  -- UTI ruled out  -- Other - I will add my own diagnosis  -- Disagree - Not applicable / Not valid  -- Disagree - Clinically unable to determine / Unknown  -- Refer to Clinical Documentation Reviewer    PROVIDER RESPONSE TEXT:    Recent E Coli UTI Was treated with bactrim DS BID x 7d    Query created by: Vianney Avendano on 2021 12:07 PM      Electronically signed by:  Constantino Brunner MD 2021 3:37 PM

## 2021-07-30 NOTE — CONSULTS
NUTRITION ASSESSMENT  Admission Date: 7/29/2021     Type and Reason for Visit: Initial, Positive Nutrition Screen    NUTRITION RECOMMENDATIONS:   1. PO Diet: Continue NPO per MD.  2. Nutrition Education: Low fiber diet for diverticulitis, high fiber for diverticulosis educations provided. 3. Offer ONS when diet able to be advanced. NUTRITION ASSESSMENT:  Nutritional summary & status: Pt triggered malnutrition screening tool for wt loss and poor po intakes. Pt reports decreased po intakes for the last few weeks d/t nausea, vomiting, and abdominal pain. Pt reports that she had a diverticulitis flare up three years ago and has not yet received education. RD provided low fiber diet education for when pt has a diverticulitis flare up. Also provided high fiber education for diverticulosis - or when not having a flare up. RD explained the reasoning for differences in diets. RD phone number left with pt and pt encouraged to call with any further questions or concerns. RD to continue monitoring. Patient admitted d/t abdominal pain    PMH significant for: HTN, hemochromatosis, diverticulosis and previous diverticulitis. MALNUTRITION ASSESSMENT  Context of Malnutrition: Acute Illness   Malnutrition Status:  At risk for malnutrition (Comment) (decreased po + NPO)  Findings of the 6 clinical characteristics of malnutrition (Minimum of 2 out of 6 clinical characteristics is required to make the diagnosis of moderate or severe Protein Calorie Malnutrition based on AND/ASPEN Guidelines):  Energy Intake: Less than/equal to 75% of estimated energy requirements    Energy Intake Time: Greater than or equal to 7 days    Weight Loss %: No significant loss   Weight loss Time: Greater than or equal to 6 months   Body Fat Loss: No significant loss    Body Fat Location: No Significant   Body Muscle Loss: No significant loss    Body Muscle Loss Location: No significant    Fluid Accumulation: No significant    Fluid Accumulation Location: No significant     Strength: Not Performed; Not Measured     NUTRITION DIAGNOSIS   Problem: Problem #1: Inadequate oral intake   Etiology: Altered GI function  Signs & Symptoms: Diet history of poor intake , Nausea, Nutrition Support-EN, Patient report of  and Vomiting  Pt report of pain     NUTRITION MONITORING & EVALUATION:  Evaluation:Goals set   Goals: Pt will tolerate diet advancement to consume >50% of meals and supplements throughout admission.   Monitoring: Diet advancement , Diet Tolerance , GI Function , Meal Intake , Supplement Intake , Weight  or Vomiting      OBJECTIVE DATA: Significant to nutrition assessment  · Nutrition-Focused Physical Findings:   lbm 7/30 - loose  · Labs: Reviewed; K 3.3, Mg 1.7  · Meds: Reviewed; KCl  · Wounds:   None      ANTHROPOMETRICS  Current Height: 5' 8\" (172.7 cm)  Current Weight: 148 lb 13 oz (67.5 kg)    Admission weight: 155 lb (70.3 kg)  Ideal Body Weight (lbs) (Calculated): 140 lbs (Ideal Body Weight (Kg) (Calculated): 64 kg)  Usual Bodyweight ALEXYS - limited actual wt hx   Weight Changes limited actual wt hx; no et loss noted in last 8 months      BMI BMI (Calculated): 22.7    Wt Readings from Last 50 Encounters:   07/30/21 148 lb 13 oz (67.5 kg)   07/15/21 148 lb 12.8 oz (67.5 kg)   11/25/20 162 lb 4.8 oz (73.6 kg)   11/13/20 158 lb (71.7 kg)   11/13/20 158 lb (71.7 kg)   05/12/20 154 lb (69.9 kg)       COMPARATIVE STANDARDS  Estimated Total Kcals/Day : 25-30  Current Bodyweight (68 kg) 9491-6214 kcal/day    Estimated Total Protein (g/day) : 1.2-1.4 Current Bodyweight (68 kg) 82-95 g/day  Estimated Daily Total Fluid (ml/day): 4672-9466 mL per day     CURRENT NUTRITION THERAPIES  Diet NPO Exceptions are: Ice Chips, Sips of Water with Meds     PO Intake: NPO  PO Supplement Intake: NPO  IVF: 100 ml/hr OMAYRA Arriaga, [address]  [place]:  [phone]  Office:  [phone]

## 2021-07-31 LAB
ALBUMIN SERPL-MCNC: 3 G/DL (ref 3.4–5)
ANION GAP SERPL CALCULATED.3IONS-SCNC: 10 MMOL/L (ref 3–16)
BASOPHILS ABSOLUTE: 0 K/UL (ref 0–0.2)
BASOPHILS ABSOLUTE: 0 K/UL (ref 0–0.2)
BASOPHILS RELATIVE PERCENT: 0 %
BASOPHILS RELATIVE PERCENT: 0.2 %
BUN BLDV-MCNC: 7 MG/DL (ref 7–20)
CALCIUM SERPL-MCNC: 8.8 MG/DL (ref 8.3–10.6)
CHLORIDE BLD-SCNC: 104 MMOL/L (ref 99–110)
CO2: 24 MMOL/L (ref 21–32)
CREAT SERPL-MCNC: <0.5 MG/DL (ref 0.6–1.2)
EOSINOPHILS ABSOLUTE: 0 K/UL (ref 0–0.6)
EOSINOPHILS ABSOLUTE: 0 K/UL (ref 0–0.6)
EOSINOPHILS RELATIVE PERCENT: 0 %
EOSINOPHILS RELATIVE PERCENT: 0.2 %
GFR AFRICAN AMERICAN: >60
GFR NON-AFRICAN AMERICAN: >60
GLUCOSE BLD-MCNC: 82 MG/DL (ref 70–99)
HCT VFR BLD CALC: 33.3 % (ref 36–48)
HCT VFR BLD CALC: 34.2 % (ref 36–48)
HEMOGLOBIN: 11.3 G/DL (ref 12–16)
HEMOGLOBIN: 11.7 G/DL (ref 12–16)
LYMPHOCYTES ABSOLUTE: 1.2 K/UL (ref 1–5.1)
LYMPHOCYTES ABSOLUTE: 1.3 K/UL (ref 1–5.1)
LYMPHOCYTES RELATIVE PERCENT: 7 %
LYMPHOCYTES RELATIVE PERCENT: 8.6 %
MAGNESIUM: 1.9 MG/DL (ref 1.8–2.4)
MCH RBC QN AUTO: 36.7 PG (ref 26–34)
MCH RBC QN AUTO: 36.8 PG (ref 26–34)
MCHC RBC AUTO-ENTMCNC: 33.9 G/DL (ref 31–36)
MCHC RBC AUTO-ENTMCNC: 34.1 G/DL (ref 31–36)
MCV RBC AUTO: 107.9 FL (ref 80–100)
MCV RBC AUTO: 108.7 FL (ref 80–100)
MONOCYTES ABSOLUTE: 0.4 K/UL (ref 0–1.3)
MONOCYTES ABSOLUTE: 0.7 K/UL (ref 0–1.3)
MONOCYTES RELATIVE PERCENT: 2 %
MONOCYTES RELATIVE PERCENT: 4.8 %
NEUTROPHILS ABSOLUTE: 11.9 K/UL (ref 1.7–7.7)
NEUTROPHILS ABSOLUTE: 16.7 K/UL (ref 1.7–7.7)
NEUTROPHILS RELATIVE PERCENT: 86.2 %
NEUTROPHILS RELATIVE PERCENT: 91 %
PDW BLD-RTO: 12.8 % (ref 12.4–15.4)
PDW BLD-RTO: 12.9 % (ref 12.4–15.4)
PHOSPHORUS: 2.7 MG/DL (ref 2.5–4.9)
PLATELET # BLD: 309 K/UL (ref 135–450)
PLATELET # BLD: 334 K/UL (ref 135–450)
PMV BLD AUTO: 7.9 FL (ref 5–10.5)
PMV BLD AUTO: 8.4 FL (ref 5–10.5)
POTASSIUM SERPL-SCNC: 4 MMOL/L (ref 3.5–5.1)
RBC # BLD: 3.06 M/UL (ref 4–5.2)
RBC # BLD: 3.17 M/UL (ref 4–5.2)
RBC # BLD: NORMAL 10*6/UL
SODIUM BLD-SCNC: 138 MMOL/L (ref 136–145)
WBC # BLD: 13.9 K/UL (ref 4–11)
WBC # BLD: 18.3 K/UL (ref 4–11)

## 2021-07-31 PROCEDURE — 6370000000 HC RX 637 (ALT 250 FOR IP): Performed by: INTERNAL MEDICINE

## 2021-07-31 PROCEDURE — 6360000002 HC RX W HCPCS: Performed by: INTERNAL MEDICINE

## 2021-07-31 PROCEDURE — 80069 RENAL FUNCTION PANEL: CPT

## 2021-07-31 PROCEDURE — 6360000002 HC RX W HCPCS

## 2021-07-31 PROCEDURE — 2580000003 HC RX 258: Performed by: INTERNAL MEDICINE

## 2021-07-31 PROCEDURE — 2580000003 HC RX 258: Performed by: STUDENT IN AN ORGANIZED HEALTH CARE EDUCATION/TRAINING PROGRAM

## 2021-07-31 PROCEDURE — 2500000003 HC RX 250 WO HCPCS

## 2021-07-31 PROCEDURE — 36415 COLL VENOUS BLD VENIPUNCTURE: CPT

## 2021-07-31 PROCEDURE — 85025 COMPLETE CBC W/AUTO DIFF WBC: CPT

## 2021-07-31 PROCEDURE — 2500000003 HC RX 250 WO HCPCS: Performed by: INTERNAL MEDICINE

## 2021-07-31 PROCEDURE — 2580000003 HC RX 258

## 2021-07-31 PROCEDURE — 83735 ASSAY OF MAGNESIUM: CPT

## 2021-07-31 PROCEDURE — 1200000000 HC SEMI PRIVATE

## 2021-07-31 PROCEDURE — 99232 SBSQ HOSP IP/OBS MODERATE 35: CPT | Performed by: SURGERY

## 2021-07-31 RX ORDER — SODIUM CHLORIDE 9 MG/ML
INJECTION, SOLUTION INTRAVENOUS CONTINUOUS
Status: DISCONTINUED | OUTPATIENT
Start: 2021-07-31 | End: 2021-08-01

## 2021-07-31 RX ORDER — MAGNESIUM SULFATE IN WATER 40 MG/ML
2000 INJECTION, SOLUTION INTRAVENOUS ONCE
Status: COMPLETED | OUTPATIENT
Start: 2021-07-31 | End: 2021-07-31

## 2021-07-31 RX ORDER — HYDROCODONE BITARTRATE AND ACETAMINOPHEN 5; 325 MG/1; MG/1
1 TABLET ORAL EVERY 6 HOURS PRN
Status: DISCONTINUED | OUTPATIENT
Start: 2021-07-31 | End: 2021-08-02 | Stop reason: HOSPADM

## 2021-07-31 RX ORDER — LOPERAMIDE HYDROCHLORIDE 2 MG/1
2 CAPSULE ORAL 4 TIMES DAILY PRN
Status: DISCONTINUED | OUTPATIENT
Start: 2021-07-31 | End: 2021-08-02 | Stop reason: HOSPADM

## 2021-07-31 RX ADMIN — SODIUM PHOSPHATE, MONOBASIC, MONOHYDRATE 10 MMOL: 276; 142 INJECTION, SOLUTION INTRAVENOUS at 09:59

## 2021-07-31 RX ADMIN — METRONIDAZOLE 500 MG: 500 INJECTION, SOLUTION INTRAVENOUS at 22:29

## 2021-07-31 RX ADMIN — ALPRAZOLAM 0.5 MG: 0.5 TABLET ORAL at 00:38

## 2021-07-31 RX ADMIN — ENOXAPARIN SODIUM 40 MG: 40 INJECTION SUBCUTANEOUS at 09:18

## 2021-07-31 RX ADMIN — Medication 10 ML: at 21:52

## 2021-07-31 RX ADMIN — SODIUM CHLORIDE, POTASSIUM CHLORIDE, SODIUM LACTATE AND CALCIUM CHLORIDE: 600; 310; 30; 20 INJECTION, SOLUTION INTRAVENOUS at 09:19

## 2021-07-31 RX ADMIN — MAGNESIUM SULFATE HEPTAHYDRATE 2000 MG: 2 INJECTION, SOLUTION INTRAVENOUS at 09:19

## 2021-07-31 RX ADMIN — MORPHINE SULFATE 2 MG: 2 INJECTION, SOLUTION INTRAMUSCULAR; INTRAVENOUS at 05:07

## 2021-07-31 RX ADMIN — METRONIDAZOLE 500 MG: 500 INJECTION, SOLUTION INTRAVENOUS at 14:43

## 2021-07-31 RX ADMIN — HYDROCODONE BITARTRATE AND ACETAMINOPHEN 1 TABLET: 5; 325 TABLET ORAL at 17:53

## 2021-07-31 RX ADMIN — Medication 10 ML: at 10:28

## 2021-07-31 RX ADMIN — LOPERAMIDE HYDROCHLORIDE 2 MG: 2 CAPSULE ORAL at 21:52

## 2021-07-31 RX ADMIN — SODIUM CHLORIDE: 9 INJECTION, SOLUTION INTRAVENOUS at 21:51

## 2021-07-31 RX ADMIN — Medication 10 ML: at 10:27

## 2021-07-31 RX ADMIN — ACETAMINOPHEN 650 MG: 325 TABLET ORAL at 12:04

## 2021-07-31 RX ADMIN — SODIUM CHLORIDE, POTASSIUM CHLORIDE, SODIUM LACTATE AND CALCIUM CHLORIDE: 600; 310; 30; 20 INJECTION, SOLUTION INTRAVENOUS at 21:30

## 2021-07-31 RX ADMIN — CEFTRIAXONE 1000 MG: 1 INJECTION, POWDER, FOR SOLUTION INTRAMUSCULAR; INTRAVENOUS at 21:53

## 2021-07-31 RX ADMIN — METRONIDAZOLE 500 MG: 500 INJECTION, SOLUTION INTRAVENOUS at 06:26

## 2021-07-31 ASSESSMENT — PAIN SCALES - GENERAL
PAINLEVEL_OUTOF10: 0
PAINLEVEL_OUTOF10: 0
PAINLEVEL_OUTOF10: 6
PAINLEVEL_OUTOF10: 7
PAINLEVEL_OUTOF10: 0
PAINLEVEL_OUTOF10: 5
PAINLEVEL_OUTOF10: 6
PAINLEVEL_OUTOF10: 6
PAINLEVEL_OUTOF10: 0
PAINLEVEL_OUTOF10: 5
PAINLEVEL_OUTOF10: 2
PAINLEVEL_OUTOF10: 6

## 2021-07-31 NOTE — PROGRESS NOTES
HOSPITALISTS Progress Note    7/31/2021 12:14 PM    Patient Information:  Tonny Banuelos is a 62 y.o. female [id number]  PCP:  Mati Cutler MD (Tel: [phone] )    Chief complaint:  Abdominal pain        History of Present Illness:   62 y.o. female with HTN, hemochromatosis,  Diverticulosis and previous diverticulitis, who presented with 2-week symptoms of abdominal pain. She was seen by her PCP for urinary symptoms, and treated for UTI with some improvement in urinary symptoms. Urine culture did grow E. coli on 7/15/2021. She however,, over the past 2 days, noted persistent lower abdominal pain with nausea. . she also noted diarrhea. No blood in the stools. She has a hx of diverticulitis about 4 years ago, treated with antibiotics. In the ED, she was afebrile. Normal vitals. Labs showed leucocytosis of 15.4. She was admitted for further eval and management. Interval History  No new complaints  No further fevers    Still abd pain,  But states improved    Has been having diarrhea     No N/V        REVIEW OF SYSTEMS:   All other ROS negative except mentioned in [place]      Medications: Reviewed        Allergies: Allergies   Allergen Reactions    Dust Mite Extract     Macrobid [Nitrofurantoin Macrocrystal] Rash          Physical Exam:  /78   Pulse 84   Temp 99.1 °F (37.3 °C) (Oral)   Resp 16   Ht 5' 8\" (1.727 m)   Wt 148 lb 13 oz (67.5 kg)   SpO2 96%   BMI 22.63 kg/m²     General appearance:  Appears comfortable. Well nourished  Eyes: Sclera clear, pupils equal  ENT: Dry mucus membranes, no thrush. Trachea midline. Cardiovascular: Regular rhythm, normal S1, S2. No murmur, gallop, rub. No edema in lower extremities  Respiratory: Clear to auscultation bilaterally, no wheeze, good inspiratory effort  Abdomen: Soft, tender in the RLQ, LLQ and the suprapubic area.    Musculoskeletal: No cyanosis in digits, neck supple  Neurology: Cranial nerves grossly intact. Alert and oriented in time, place and person. No speech or motor deficits  Psychiatry: Appropriate affect. Not agitated, does seem to have a little bit of pressured speech  Skin: Warm, dry, normal turgor, no rash  Brisk capillary refill, peripheral pulses palpable       Labs:  CBC:   Lab Results   Component Value Date    WBC 18.3 07/31/2021    RBC 3.17 07/31/2021    HGB 11.7 07/31/2021    HCT 34.2 07/31/2021    .9 07/31/2021    MCH 36.7 07/31/2021    MCHC 34.1 07/31/2021    RDW 12.8 07/31/2021     07/31/2021    MPV 8.4 07/31/2021     BMP:    Lab Results   Component Value Date     07/31/2021    K 4.0 07/31/2021    K 3.3 07/30/2021     07/31/2021    CO2 24 07/31/2021    BUN 7 07/31/2021    CREATININE <0.5 07/31/2021    CALCIUM 8.8 07/31/2021    GFRAA >60 07/31/2021    LABGLOM >60 07/31/2021    LABGLOM 101 12/09/2017    GLUCOSE 82 07/31/2021     CT ABDOMEN PELVIS W IV CONTRAST Additional Contrast? None   Final Result      Diverticulosis of the sigmoid colon. Localized wall thickening and pericolonic soft tissue stranding. Scattered small multiple foci of extraluminal air in the pelvis, consistent with regional microperforation. No other acute findings in the abdomen or pelvis. Findings were personally called to Jose Manuel vu in the ED at 2145 hours on 7/29/2021. Assessment/Plan:   62 y.o. female with HTN, hemochromatosis,  Diverticulosis and previous diverticulitis, who presented with 2-week symptoms of abdominal pain. 1. Recurrent Diverticulitis complicated with microperforation. POA  - Presented with fever 37.9, and leucocytosis  - Second episode of diverticulitis  - Had a \"negative colonoscopy\" last year.   - No features of peritoneal irritation on exam  - Still having a lot of diarrhea, and bump in WBC noted today  - Had a negative C diff test 7/30, on admission.   - Will favor further bowel rest, with bumb in WBC and persistent diarrhea, but has been placed on clears per Surgery: Monitor for tolerance. - Continue serial abdominal exams  - Continue Rocephin and Flagyl, IV fluids, pain control  - Repeat CBC today, monitor  - Surgery consulted, juwan as the divertic. is second episode; eval candidacy for elective surgery. 2. Alcohol abuse  3. Macrocytosis without anemia  Monitor for alcohol withdrawal   CIWA   Normal B12, folate in 12/2021  Replenish electrolytes      4. Recent E Coli UTI  Cx/sens from PCP visit: Pan-sensitive E Coli  UA on admission had a lot of epithelial cells  Was treated with bactrim DS BID x 7d      5. Hereditary hemochromatosis ([facility], [place] [address]  Needs f/u with heme/onc  Signif family hx, and states HFE gene positive  Was being treated with phlebotomy  Wants 2nd opinion, and was referred to [facility] heme/onc for eval by PCP       6. Essential hypertension  Holding lisinopril for now      7. Primary insomnia/Anxiety  Chronic sx, mildl worse related to stressors  Melatonin 5-10mg qhs  Was placed on ALPRAZolam 0.5 MG tablet; Take 1 tablet by mouth 3 times daily as needed for Sleep or Anxiety per PCP and refered to psychology for evaluation and tx  Also On cymbalta        DVT prophylaxis Lovenox  Code status full code  Diet : Started on clears per surgery. Monitor for tolerance  IV access peripheral  Simeon Catheter no      Disposition: Pending progress.       Adelia Givens MD    7/31/2021 12:14 PM

## 2021-07-31 NOTE — PROGRESS NOTES
General Surgery   Daily Progress Note  Patient: Jacey Amaya    CC: Diverticulitis     SUBJECTIVE:  Patient remained afebrile overnight with no acute events reported. This AM she reports interval improvement in abdominal pain. She continues to pass BMs with which she continues to report significant dyschezia. No complaints of nausea and no episodes of emesis. She's hungry this morning. ROS: A 14 point review of systems was conducted, significant findings as noted above. All other systems negative. OBJECTIVE:   Infusions:   sodium chloride      lactated ringers 100 mL/hr at 07/30/21 2248    sodium chloride        I/O:I/O last 3 completed shifts: In: 180 [P.O.:180]  Out: -            Wt Readings from Last 1 Encounters:   07/30/21 148 lb 13 oz (67.5 kg)     Exam:  /76   Pulse 85   Temp 98.2 °F (36.8 °C) (Oral)   Resp 16   Ht 5' 8\" (1.727 m)   Wt 148 lb 13 oz (67.5 kg)   SpO2 94%   BMI 22.63 kg/m²     General Appearance: Alert, in no apparent distress   Neuro: A&Ox3, no focal deficits  Lungs: Normal effort; breathing room air  Heart: Regular rate and rhythm  Abdomen: Soft, flat, mild lower abdominal tenderness  Extremities: No edema, no cyanosis            LABS:   Recent Labs     07/29/21  1811 07/30/21  0553   WBC 15.4* 14.5*   HGB 12.3 12.3   HCT 35.9* 35.9*   .8* 105.4*    353     Recent Labs     07/29/21  1811 07/30/21  0553    138   K 4.1 3.3*    104   CO2 25 21   BUN 6* 6*   CREATININE <0.5* <0.5*        Recent Labs     07/29/21  1811   AST 21   ALT 20   BILIDIR <0.2   BILITOT 1.0   ALKPHOS 75      No results for input(s): LIPASE, AMYLASE in the last 72 hours. Recent Labs     07/29/21  1811   PROT 6.9      No results for input(s): CKTOTAL, CKMB, CKMBINDEX, TROPONINI in the last 72 hours.     ASSESSMENT/PLAN:   Patient is a 62 y.o. female with Hinchey class I diverticulitis with interval improvement with IV ABx.     - Continue non-operative management with IV ABx - OK to advance diet at the discretion of the primary team   - Further medical management per primary team  - Will continue to follow      Stephen Herrera DO  PGY1, General Surgery  07/31/21  6:16 AM  [phone]

## 2021-07-31 NOTE — PLAN OF CARE
Problem: Pain:  Goal: Pain level will decrease  Description: Pain level will decrease  Outcome: Ongoing  Pt c/o 9/10 intermittent sharp abdominal pain, given PRN morphine per orders. Pt expresses relief, will continue to monitor. Problem: Nutrition  Goal: Optimal nutrition therapy  7/31/2021 0214 by Rashida Carmona RN  Outcome: Ongoing  Pt is currently still NPO with ice chips, IV fluids infusing per orders. Will continue to monitor.

## 2021-08-01 LAB
ALBUMIN SERPL-MCNC: 2.7 G/DL (ref 3.4–5)
ANION GAP SERPL CALCULATED.3IONS-SCNC: 7 MMOL/L (ref 3–16)
BASOPHILS ABSOLUTE: 0 K/UL (ref 0–0.2)
BASOPHILS RELATIVE PERCENT: 0.3 %
BUN BLDV-MCNC: 5 MG/DL (ref 7–20)
CALCIUM SERPL-MCNC: 8.5 MG/DL (ref 8.3–10.6)
CHLORIDE BLD-SCNC: 105 MMOL/L (ref 99–110)
CO2: 27 MMOL/L (ref 21–32)
CREAT SERPL-MCNC: <0.5 MG/DL (ref 0.6–1.2)
EOSINOPHILS ABSOLUTE: 0.1 K/UL (ref 0–0.6)
EOSINOPHILS RELATIVE PERCENT: 0.8 %
GFR AFRICAN AMERICAN: >60
GFR NON-AFRICAN AMERICAN: >60
GLUCOSE BLD-MCNC: 126 MG/DL (ref 70–99)
HCT VFR BLD CALC: 30.9 % (ref 36–48)
HEMOGLOBIN: 10.8 G/DL (ref 12–16)
LYMPHOCYTES ABSOLUTE: 1.3 K/UL (ref 1–5.1)
LYMPHOCYTES RELATIVE PERCENT: 11.2 %
MAGNESIUM: 1.8 MG/DL (ref 1.8–2.4)
MCH RBC QN AUTO: 37.4 PG (ref 26–34)
MCHC RBC AUTO-ENTMCNC: 35 G/DL (ref 31–36)
MCV RBC AUTO: 106.9 FL (ref 80–100)
MONOCYTES ABSOLUTE: 0.7 K/UL (ref 0–1.3)
MONOCYTES RELATIVE PERCENT: 6.6 %
NEUTROPHILS ABSOLUTE: 9.1 K/UL (ref 1.7–7.7)
NEUTROPHILS RELATIVE PERCENT: 81.1 %
PDW BLD-RTO: 12.6 % (ref 12.4–15.4)
PHOSPHORUS: 2.7 MG/DL (ref 2.5–4.9)
PLATELET # BLD: 302 K/UL (ref 135–450)
PMV BLD AUTO: 8 FL (ref 5–10.5)
POTASSIUM SERPL-SCNC: 3.2 MMOL/L (ref 3.5–5.1)
RBC # BLD: 2.89 M/UL (ref 4–5.2)
SODIUM BLD-SCNC: 139 MMOL/L (ref 136–145)
WBC # BLD: 11.2 K/UL (ref 4–11)

## 2021-08-01 PROCEDURE — 85025 COMPLETE CBC W/AUTO DIFF WBC: CPT

## 2021-08-01 PROCEDURE — 36415 COLL VENOUS BLD VENIPUNCTURE: CPT

## 2021-08-01 PROCEDURE — 1200000000 HC SEMI PRIVATE

## 2021-08-01 PROCEDURE — 80069 RENAL FUNCTION PANEL: CPT

## 2021-08-01 PROCEDURE — 2580000003 HC RX 258: Performed by: INTERNAL MEDICINE

## 2021-08-01 PROCEDURE — 83735 ASSAY OF MAGNESIUM: CPT

## 2021-08-01 PROCEDURE — 6370000000 HC RX 637 (ALT 250 FOR IP): Performed by: INTERNAL MEDICINE

## 2021-08-01 PROCEDURE — 99231 SBSQ HOSP IP/OBS SF/LOW 25: CPT | Performed by: SURGERY

## 2021-08-01 PROCEDURE — 6360000002 HC RX W HCPCS: Performed by: INTERNAL MEDICINE

## 2021-08-01 PROCEDURE — 6370000000 HC RX 637 (ALT 250 FOR IP): Performed by: STUDENT IN AN ORGANIZED HEALTH CARE EDUCATION/TRAINING PROGRAM

## 2021-08-01 PROCEDURE — 2500000003 HC RX 250 WO HCPCS: Performed by: INTERNAL MEDICINE

## 2021-08-01 PROCEDURE — 6360000002 HC RX W HCPCS: Performed by: STUDENT IN AN ORGANIZED HEALTH CARE EDUCATION/TRAINING PROGRAM

## 2021-08-01 RX ORDER — POTASSIUM CHLORIDE 20 MEQ/1
40 TABLET, EXTENDED RELEASE ORAL ONCE
Status: COMPLETED | OUTPATIENT
Start: 2021-08-01 | End: 2021-08-01

## 2021-08-01 RX ORDER — SACCHAROMYCES BOULARDII 250 MG
250 CAPSULE ORAL 2 TIMES DAILY
Status: DISCONTINUED | OUTPATIENT
Start: 2021-08-01 | End: 2021-08-02 | Stop reason: HOSPADM

## 2021-08-01 RX ORDER — MAGNESIUM SULFATE IN WATER 40 MG/ML
2000 INJECTION, SOLUTION INTRAVENOUS ONCE
Status: COMPLETED | OUTPATIENT
Start: 2021-08-01 | End: 2021-08-01

## 2021-08-01 RX ORDER — AMOXICILLIN AND CLAVULANATE POTASSIUM 875; 125 MG/1; MG/1
1 TABLET, FILM COATED ORAL 2 TIMES DAILY
Qty: 20 TABLET | Refills: 0 | Status: SHIPPED | OUTPATIENT
Start: 2021-08-01 | End: 2021-08-11

## 2021-08-01 RX ORDER — DIAZEPAM 2 MG/1
2 TABLET ORAL EVERY 6 HOURS PRN
Status: DISCONTINUED | OUTPATIENT
Start: 2021-08-01 | End: 2021-08-02 | Stop reason: HOSPADM

## 2021-08-01 RX ORDER — POTASSIUM CHLORIDE 7.45 MG/ML
10 INJECTION INTRAVENOUS
Status: DISPENSED | OUTPATIENT
Start: 2021-08-01 | End: 2021-08-01

## 2021-08-01 RX ADMIN — ONDANSETRON 4 MG: 4 TABLET, ORALLY DISINTEGRATING ORAL at 21:10

## 2021-08-01 RX ADMIN — HYDROCODONE BITARTRATE AND ACETAMINOPHEN 1 TABLET: 5; 325 TABLET ORAL at 16:59

## 2021-08-01 RX ADMIN — HYDROCODONE BITARTRATE AND ACETAMINOPHEN 1 TABLET: 5; 325 TABLET ORAL at 00:45

## 2021-08-01 RX ADMIN — Medication 250 MG: at 23:08

## 2021-08-01 RX ADMIN — POTASSIUM CHLORIDE 10 MEQ: 7.46 INJECTION, SOLUTION INTRAVENOUS at 07:19

## 2021-08-01 RX ADMIN — DIBASIC SODIUM PHOSPHATE, MONOBASIC POTASSIUM PHOSPHATE AND MONOBASIC SODIUM PHOSPHATE 2 TABLET: 852; 155; 130 TABLET ORAL at 20:36

## 2021-08-01 RX ADMIN — POTASSIUM CHLORIDE 10 MEQ: 7.46 INJECTION, SOLUTION INTRAVENOUS at 05:52

## 2021-08-01 RX ADMIN — DIBASIC SODIUM PHOSPHATE, MONOBASIC POTASSIUM PHOSPHATE AND MONOBASIC SODIUM PHOSPHATE 2 TABLET: 852; 155; 130 TABLET ORAL at 09:04

## 2021-08-01 RX ADMIN — Medication 10 ML: at 09:48

## 2021-08-01 RX ADMIN — POTASSIUM CHLORIDE 10 MEQ: 7.46 INJECTION, SOLUTION INTRAVENOUS at 09:05

## 2021-08-01 RX ADMIN — POTASSIUM CHLORIDE 40 MEQ: 1500 TABLET, EXTENDED RELEASE ORAL at 07:18

## 2021-08-01 RX ADMIN — DIAZEPAM 2 MG: 2 TABLET ORAL at 23:08

## 2021-08-01 RX ADMIN — METRONIDAZOLE 500 MG: 500 INJECTION, SOLUTION INTRAVENOUS at 05:43

## 2021-08-01 RX ADMIN — HYDROCODONE BITARTRATE AND ACETAMINOPHEN 1 TABLET: 5; 325 TABLET ORAL at 09:04

## 2021-08-01 RX ADMIN — Medication 10 ML: at 20:37

## 2021-08-01 RX ADMIN — METRONIDAZOLE 500 MG: 500 INJECTION, SOLUTION INTRAVENOUS at 14:14

## 2021-08-01 RX ADMIN — ALPRAZOLAM 0.5 MG: 0.5 TABLET ORAL at 03:02

## 2021-08-01 RX ADMIN — MAGNESIUM SULFATE HEPTAHYDRATE 2000 MG: 2 INJECTION, SOLUTION INTRAVENOUS at 07:19

## 2021-08-01 RX ADMIN — ENOXAPARIN SODIUM 40 MG: 40 INJECTION SUBCUTANEOUS at 09:03

## 2021-08-01 RX ADMIN — CEFTRIAXONE 1000 MG: 1 INJECTION, POWDER, FOR SOLUTION INTRAMUSCULAR; INTRAVENOUS at 20:36

## 2021-08-01 ASSESSMENT — PAIN DESCRIPTION - ORIENTATION: ORIENTATION: LOWER;ANTERIOR

## 2021-08-01 ASSESSMENT — PAIN SCALES - GENERAL
PAINLEVEL_OUTOF10: 0
PAINLEVEL_OUTOF10: 7
PAINLEVEL_OUTOF10: 0
PAINLEVEL_OUTOF10: 8
PAINLEVEL_OUTOF10: 8
PAINLEVEL_OUTOF10: 5
PAINLEVEL_OUTOF10: 8
PAINLEVEL_OUTOF10: 6
PAINLEVEL_OUTOF10: 0
PAINLEVEL_OUTOF10: 0
PAINLEVEL_OUTOF10: 7

## 2021-08-01 ASSESSMENT — PAIN - FUNCTIONAL ASSESSMENT: PAIN_FUNCTIONAL_ASSESSMENT: PREVENTS OR INTERFERES SOME ACTIVE ACTIVITIES AND ADLS

## 2021-08-01 ASSESSMENT — PAIN DESCRIPTION - ONSET: ONSET: AWAKENED FROM SLEEP

## 2021-08-01 ASSESSMENT — PAIN DESCRIPTION - PROGRESSION: CLINICAL_PROGRESSION: NOT CHANGED

## 2021-08-01 ASSESSMENT — PAIN DESCRIPTION - FREQUENCY: FREQUENCY: CONTINUOUS

## 2021-08-01 ASSESSMENT — PAIN DESCRIPTION - LOCATION
LOCATION: ABDOMEN
LOCATION: ABDOMEN;HEAD

## 2021-08-01 ASSESSMENT — PAIN DESCRIPTION - PAIN TYPE
TYPE: ACUTE PAIN
TYPE: ACUTE PAIN

## 2021-08-01 ASSESSMENT — PAIN DESCRIPTION - DESCRIPTORS: DESCRIPTORS: ACHING

## 2021-08-01 NOTE — PROGRESS NOTES
General Surgery   Daily Progress Note  Patient: Amanda Prater    CC: Diverticulitis     SUBJECTIVE:  Patient rested well overnight. Remains afebrile and hemodynamically stable. Tolerating a CLD with no nausea or vomiting. Had multiple loose BMs over the day yesterday. ROS: A 14 point review of systems was conducted, significant findings as noted above. All other systems negative. OBJECTIVE:   Infusions:   sodium chloride 75 mL/hr at 07/31/21 2151    sodium chloride      sodium chloride        I/O:I/O last 3 completed shifts: In: 0573 [P.O.:1140; I.V.:4985]  Out: -            Wt Readings from Last 1 Encounters:   08/01/21 151 lb 14.4 oz (68.9 kg)     Exam:  /76   Pulse 64   Temp 98.4 °F (36.9 °C) (Oral)   Resp 20   Ht 5' 8\" (1.727 m)   Wt 151 lb 14.4 oz (68.9 kg)   SpO2 97%   BMI 23.10 kg/m²     General Appearance: Alert, in no apparent distress   Neuro: A&Ox3, no focal deficits  Lungs: Normal effort; breathing room air  Heart: Regular rate and rhythm  Abdomen: Soft, flat, mild lower abdominal tenderness  Extremities: No edema, no cyanosis            LABS:   Recent Labs     07/31/21  1607 08/01/21  0427   WBC 13.9* 11.2*   HGB 11.3* 10.8*   HCT 33.3* 30.9*   .7* 106.9*    302     Recent Labs     07/31/21  0414 08/01/21  0427    139   K 4.0 3.2*    105   CO2 24 27   PHOS 2.7 2.7   BUN 7 5*   CREATININE <0.5* <0.5*        Recent Labs     07/29/21  1811   AST 21   ALT 20   BILIDIR <0.2   BILITOT 1.0   ALKPHOS 75      No results for input(s): LIPASE, AMYLASE in the last 72 hours. Recent Labs     07/29/21  1811   PROT 6.9      No results for input(s): CKTOTAL, CKMB, CKMBINDEX, TROPONINI in the last 72 hours.     ASSESSMENT/PLAN:   Patient is a 62 y.o. female with Hinchey class I diverticulitis with interval improvement with IV ABx.     - Continue non-operative management with IV ABx   - Advance to low fiber diet today  - Further medical management per primary team  - Will continue to follow    Capo Serrano DO, [address]  PGY1, General Surgery  08/01/21  6:36 AM  [phone]

## 2021-08-01 NOTE — PROGRESS NOTES
HOSPITALISTS Progress Note    8/1/2021 12:23 PM    Patient Information:  Samreen Ricic is a 62 y.o. female [id number]  PCP:  Valdez Reese MD (Tel: [phone] )    Chief complaint:  Abdominal pain        History of Present Illness:   62 y.o. female with HTN, hemochromatosis,  Diverticulosis and previous diverticulitis, who presented with 2-week symptoms of abdominal pain. She was seen by her PCP for urinary symptoms, and treated for UTI with some improvement in urinary symptoms. Urine culture did grow E. coli on 7/15/2021. She however,, over the past 2 days, noted persistent lower abdominal pain with nausea. . she also noted diarrhea. No blood in the stools. She has a hx of diverticulitis about 4 years ago, treated with antibiotics. In the ED, she was afebrile. Normal vitals. Labs showed leucocytosis of 15.4. She was admitted for further eval and management. Interval History  No new complaints  No further fevers    Still abd pain,  But states improved    Has been having diarrhea     No N/V        REVIEW OF SYSTEMS:   All other ROS negative except mentioned in [place]      Medications: Reviewed        Allergies: Allergies   Allergen Reactions    Dust Mite Extract     Macrobid [Nitrofurantoin Macrocrystal] Rash          Physical Exam:  /73   Pulse 70   Temp 98.5 °F (36.9 °C) (Oral)   Resp 16   Ht 5' 8\" (1.727 m)   Wt 151 lb 14.4 oz (68.9 kg)   SpO2 93%   BMI 23.10 kg/m²     General appearance:  Appears comfortable. Well nourished  Eyes: Sclera clear, pupils equal  ENT: Dry mucus membranes, no thrush. Trachea midline. Cardiovascular: Regular rhythm, normal S1, S2. No murmur, gallop, rub. No edema in lower extremities  Respiratory: Clear to auscultation bilaterally, no wheeze, good inspiratory effort  Abdomen: Soft, tender in the RLQ, LLQ and the suprapubic area.    Musculoskeletal: No cyanosis in digits, neck supple  Neurology: Cranial nerves grossly intact. Alert and oriented in time, place and person. No speech or motor deficits  Psychiatry: Appropriate affect. Not agitated, does seem to have a little bit of pressured speech  Skin: Warm, dry, normal turgor, no rash  Brisk capillary refill, peripheral pulses palpable       Labs:  CBC:   Lab Results   Component Value Date    WBC 11.2 08/01/2021    RBC 2.89 08/01/2021    HGB 10.8 08/01/2021    HCT 30.9 08/01/2021    .9 08/01/2021    MCH 37.4 08/01/2021    MCHC 35.0 08/01/2021    RDW 12.6 08/01/2021     08/01/2021    MPV 8.0 08/01/2021     BMP:    Lab Results   Component Value Date     08/01/2021    K 3.2 08/01/2021    K 3.3 07/30/2021     08/01/2021    CO2 27 08/01/2021    BUN 5 08/01/2021    CREATININE <0.5 08/01/2021    CALCIUM 8.5 08/01/2021    GFRAA >60 08/01/2021    LABGLOM >60 08/01/2021    LABGLOM 101 12/09/2017    GLUCOSE 126 08/01/2021     CT ABDOMEN PELVIS W IV CONTRAST Additional Contrast? None   Final Result      Diverticulosis of the sigmoid colon. Localized wall thickening and pericolonic soft tissue stranding. Scattered small multiple foci of extraluminal air in the pelvis, consistent with regional microperforation. No other acute findings in the abdomen or pelvis. Findings were personally called to Ryan vu in the ED at 2145 hours on 7/29/2021. Assessment/Plan:   62 y.o. female with HTN, hemochromatosis,  Diverticulosis and previous diverticulitis, who presented with 2-week symptoms of abdominal pain. 1. Recurrent Diverticulitis complicated with microperforation. POA  - Presented with fever 37.9, and leucocytosis  - Second episode of diverticulitis  - Had a \"negative colonoscopy\" last year.   - No features of peritoneal irritation on exam  - Had a negative C diff test 7/30, on admission.   - Continue serial abdominal exams  - Continue Rocephin and Flagyl,  - DC IV fluids  - Surgery consulted, juwan as the divertic. is second episode; eval candidacy for elective surgery. O/p F/u.   - Clinically improved. Diet advanced. Monitor for tolerance. - DC within 24 hrs, if continues to improve, and tolerating diet. - Pxt has been on Ceftriaxone and flagyl which she is tolerating, but appears to have some concerns re flagyl, based on what she read on the internet. Reassured. Will plan 10 more days of Augmentin      2. Alcohol abuse  3. Macrocytosis without anemia  Monitor for alcohol withdrawal   CIWA: Not withdrawing  Normal B12, folate in 12/2021  Replenish electrolytes      4. Recent E Coli UTI  Cx/sens from PCP visit: Pan-sensitive E Coli  UA on admission had a lot of epithelial cells  Was treated with bactrim DS BID x 7d      5. Hereditary hemochromatosis   Needs f/u with heme/onc  Signif family hx, and states HFE gene positive  Was being treated with phlebotomy  Wants 2nd opinion, and was referred to  heme/onc for eval by PCP        6. Essential hypertension  Resume lisinopril at discharge      7. Primary insomnia/Anxiety  Chronic sx, mildl worse related to stressors  Melatonin 5-10mg qhs  Was placed on ALPRAZolam 0.5 MG tablet; Take 1 tablet by mouth 3 times daily as needed for Sleep or Anxiety per PCP and refered to psychology for evaluation and tx  Also On cymbalta        DVT prophylaxis Lovenox  Code status full code  Diet : Started on clears per surgery. Monitor for tolerance  IV access peripheral  Simeon Catheter no      Disposition: Possible discharge within 24 hrs, probably in AM, if tolerating diet advance.         Marlene Palmer MD    8/1/2021 12:23 PM

## 2021-08-02 VITALS
SYSTOLIC BLOOD PRESSURE: 128 MMHG | WEIGHT: 151.9 LBS | HEART RATE: 76 BPM | RESPIRATION RATE: 18 BRPM | BODY MASS INDEX: 23.02 KG/M2 | TEMPERATURE: 98.1 F | DIASTOLIC BLOOD PRESSURE: 71 MMHG | OXYGEN SATURATION: 95 % | HEIGHT: 68 IN

## 2021-08-02 LAB
ALBUMIN SERPL-MCNC: 3.1 G/DL (ref 3.4–5)
ANION GAP SERPL CALCULATED.3IONS-SCNC: 9 MMOL/L (ref 3–16)
BASOPHILS ABSOLUTE: 0.1 K/UL (ref 0–0.2)
BASOPHILS RELATIVE PERCENT: 0.8 %
BUN BLDV-MCNC: 4 MG/DL (ref 7–20)
CALCIUM SERPL-MCNC: 9.2 MG/DL (ref 8.3–10.6)
CHLORIDE BLD-SCNC: 106 MMOL/L (ref 99–110)
CO2: 29 MMOL/L (ref 21–32)
CREAT SERPL-MCNC: <0.5 MG/DL (ref 0.6–1.2)
EOSINOPHILS ABSOLUTE: 0.1 K/UL (ref 0–0.6)
EOSINOPHILS RELATIVE PERCENT: 1.4 %
GFR AFRICAN AMERICAN: >60
GFR NON-AFRICAN AMERICAN: >60
GLUCOSE BLD-MCNC: 105 MG/DL (ref 70–99)
HCT VFR BLD CALC: 35.1 % (ref 36–48)
HEMOGLOBIN: 11.9 G/DL (ref 12–16)
LYMPHOCYTES ABSOLUTE: 1.5 K/UL (ref 1–5.1)
LYMPHOCYTES RELATIVE PERCENT: 17.4 %
MAGNESIUM: 1.9 MG/DL (ref 1.8–2.4)
MCH RBC QN AUTO: 37 PG (ref 26–34)
MCHC RBC AUTO-ENTMCNC: 33.8 G/DL (ref 31–36)
MCV RBC AUTO: 109.5 FL (ref 80–100)
MONOCYTES ABSOLUTE: 0.7 K/UL (ref 0–1.3)
MONOCYTES RELATIVE PERCENT: 8.5 %
NEUTROPHILS ABSOLUTE: 6.1 K/UL (ref 1.7–7.7)
NEUTROPHILS RELATIVE PERCENT: 71.9 %
PDW BLD-RTO: 12.4 % (ref 12.4–15.4)
PHOSPHORUS: 3.8 MG/DL (ref 2.5–4.9)
PLATELET # BLD: 342 K/UL (ref 135–450)
PMV BLD AUTO: 8.3 FL (ref 5–10.5)
POTASSIUM SERPL-SCNC: 3.4 MMOL/L (ref 3.5–5.1)
RBC # BLD: 3.2 M/UL (ref 4–5.2)
SODIUM BLD-SCNC: 144 MMOL/L (ref 136–145)
WBC # BLD: 8.5 K/UL (ref 4–11)

## 2021-08-02 PROCEDURE — 36415 COLL VENOUS BLD VENIPUNCTURE: CPT

## 2021-08-02 PROCEDURE — 6360000002 HC RX W HCPCS: Performed by: INTERNAL MEDICINE

## 2021-08-02 PROCEDURE — 85025 COMPLETE CBC W/AUTO DIFF WBC: CPT

## 2021-08-02 PROCEDURE — 6370000000 HC RX 637 (ALT 250 FOR IP): Performed by: INTERNAL MEDICINE

## 2021-08-02 PROCEDURE — 6360000002 HC RX W HCPCS: Performed by: STUDENT IN AN ORGANIZED HEALTH CARE EDUCATION/TRAINING PROGRAM

## 2021-08-02 PROCEDURE — 6370000000 HC RX 637 (ALT 250 FOR IP): Performed by: STUDENT IN AN ORGANIZED HEALTH CARE EDUCATION/TRAINING PROGRAM

## 2021-08-02 PROCEDURE — 80069 RENAL FUNCTION PANEL: CPT

## 2021-08-02 PROCEDURE — 83735 ASSAY OF MAGNESIUM: CPT

## 2021-08-02 PROCEDURE — 2500000003 HC RX 250 WO HCPCS: Performed by: INTERNAL MEDICINE

## 2021-08-02 PROCEDURE — 99231 SBSQ HOSP IP/OBS SF/LOW 25: CPT | Performed by: SURGERY

## 2021-08-02 PROCEDURE — 2580000003 HC RX 258: Performed by: INTERNAL MEDICINE

## 2021-08-02 RX ORDER — POTASSIUM CHLORIDE 20 MEQ/1
20 TABLET, EXTENDED RELEASE ORAL ONCE
Status: COMPLETED | OUTPATIENT
Start: 2021-08-02 | End: 2021-08-02

## 2021-08-02 RX ORDER — AMOXICILLIN AND CLAVULANATE POTASSIUM 875; 125 MG/1; MG/1
1 TABLET, FILM COATED ORAL EVERY 12 HOURS SCHEDULED
Status: DISCONTINUED | OUTPATIENT
Start: 2021-08-02 | End: 2021-08-02 | Stop reason: HOSPADM

## 2021-08-02 RX ORDER — HYDROCODONE BITARTRATE AND ACETAMINOPHEN 5; 325 MG/1; MG/1
1 TABLET ORAL EVERY 6 HOURS PRN
Qty: 12 TABLET | Refills: 0 | Status: SHIPPED | OUTPATIENT
Start: 2021-08-02 | End: 2021-08-10 | Stop reason: SDUPTHER

## 2021-08-02 RX ORDER — POTASSIUM CHLORIDE 20 MEQ/1
40 TABLET, EXTENDED RELEASE ORAL ONCE
Status: COMPLETED | OUTPATIENT
Start: 2021-08-02 | End: 2021-08-02

## 2021-08-02 RX ORDER — MAGNESIUM SULFATE IN WATER 40 MG/ML
2000 INJECTION, SOLUTION INTRAVENOUS ONCE
Status: COMPLETED | OUTPATIENT
Start: 2021-08-02 | End: 2021-08-02

## 2021-08-02 RX ADMIN — Medication 250 MG: at 08:51

## 2021-08-02 RX ADMIN — ENOXAPARIN SODIUM 40 MG: 40 INJECTION SUBCUTANEOUS at 08:51

## 2021-08-02 RX ADMIN — HYDROCODONE BITARTRATE AND ACETAMINOPHEN 1 TABLET: 5; 325 TABLET ORAL at 14:48

## 2021-08-02 RX ADMIN — POTASSIUM CHLORIDE 20 MEQ: 1500 TABLET, EXTENDED RELEASE ORAL at 14:04

## 2021-08-02 RX ADMIN — HYDROCODONE BITARTRATE AND ACETAMINOPHEN 1 TABLET: 5; 325 TABLET ORAL at 08:51

## 2021-08-02 RX ADMIN — Medication 10 ML: at 08:53

## 2021-08-02 RX ADMIN — POTASSIUM CHLORIDE 40 MEQ: 1500 TABLET, EXTENDED RELEASE ORAL at 08:57

## 2021-08-02 RX ADMIN — HYDROCODONE BITARTRATE AND ACETAMINOPHEN 1 TABLET: 5; 325 TABLET ORAL at 00:24

## 2021-08-02 RX ADMIN — AMOXICILLIN AND CLAVULANATE POTASSIUM 1 TABLET: 875; 125 TABLET, FILM COATED ORAL at 14:04

## 2021-08-02 RX ADMIN — MAGNESIUM SULFATE HEPTAHYDRATE 2000 MG: 2 INJECTION, SOLUTION INTRAVENOUS at 08:57

## 2021-08-02 RX ADMIN — METRONIDAZOLE 500 MG: 500 INJECTION, SOLUTION INTRAVENOUS at 00:24

## 2021-08-02 RX ADMIN — ALPRAZOLAM 0.5 MG: 0.5 TABLET ORAL at 02:11

## 2021-08-02 ASSESSMENT — PAIN DESCRIPTION - LOCATION
LOCATION: ABDOMEN

## 2021-08-02 ASSESSMENT — PAIN DESCRIPTION - PAIN TYPE
TYPE: ACUTE PAIN

## 2021-08-02 ASSESSMENT — PAIN SCALES - GENERAL
PAINLEVEL_OUTOF10: 4
PAINLEVEL_OUTOF10: 9
PAINLEVEL_OUTOF10: 7
PAINLEVEL_OUTOF10: 5

## 2021-08-02 NOTE — PLAN OF CARE
Pt had 2 bowel movements throughout shift. Pt states that they were still soft but more formed than previous stools. Her BM occurences have also decreased. Pt c/o abdominal pain. Pt receives PRN pain medication which she states helps slightly. Pt is anxious and received PRN anxiety medication once throughout shift.    Problem: Pain:  Goal: Control of acute pain  Description: Control of acute pain  Outcome: Ongoing     Problem: Diarrhea:  Goal: Passage of soft, formed stool  Description: Passage of soft, formed stool  Outcome: Ongoing

## 2021-08-02 NOTE — PROGRESS NOTES
General Surgery   Daily Progress Note  Patient: Mary Reinoso    CC: Diverticulitis     SUBJECTIVE:  Patient complaining of abdominal cramping overnight. Remains afebrile and HDS. Tolerating low fiber diet,  Still having loose bowel movements. ROS: A 14 point review of systems was conducted, significant findings as noted above. All other systems negative. OBJECTIVE:   Infusions:   sodium chloride      sodium chloride        I/O:I/O last 3 completed shifts: In: 593 [P.O.:120; I.V.:473]  Out: -            Wt Readings from Last 1 Encounters:   08/01/21 151 lb 14.4 oz (68.9 kg)     Exam:  /62   Pulse 66   Temp 98.5 °F (36.9 °C) (Oral)   Resp 16   Ht 5' 8\" (1.727 m)   Wt 151 lb 14.4 oz (68.9 kg)   SpO2 97%   BMI 23.10 kg/m²     General Appearance: Alert, in no apparent distress   Neuro: A&Ox3, no focal deficits  Lungs: Normal effort; breathing room air  Heart: Regular rate and rhythm  Abdomen: Soft, flat, mild lower abdominal tenderness  Extremities: No edema, no cyanosis            LABS:   Recent Labs     07/31/21  1607 08/01/21  0427   WBC 13.9* 11.2*   HGB 11.3* 10.8*   HCT 33.3* 30.9*   .7* 106.9*    302     Recent Labs     07/31/21  0414 08/01/21  0427    139   K 4.0 3.2*    105   CO2 24 27   PHOS 2.7 2.7   BUN 7 5*   CREATININE <0.5* <0.5*        No results for input(s): AST, ALT, ALB, BILIDIR, BILITOT, ALKPHOS in the last 72 hours. No results for input(s): LIPASE, AMYLASE in the last 72 hours. No results for input(s): PROT, INR, APTT in the last 72 hours. No results for input(s): CKTOTAL, CKMB, CKMBINDEX, TROPONINI in the last 72 hours. ASSESSMENT/PLAN:   Patient is a 62 y.o. female with Hinchey class I diverticulitis with interval improvement with IV ABx.     - Continue non-operative management with IV ABx   - Continue low fiber diet today  - Recommend 10 days abx on discharge.   - Further medical management per primary team  - OK to discharge from a surgical standpoint.       Ivonne Villanueva DO  PGY1, General Surgery  08/02/21  6:18 AM  [phone]

## 2021-08-02 NOTE — CARE COORDINATION
SW Following, Pt from home w/, Pt plans to return home at [place] and is open to [facility] if it is needed, Pt's  will transport.     Electronically signed by RONEN Brothers, STEPHANIE on 8/2/2021 at 9:56 AM   [phone]

## 2021-08-02 NOTE — CARE COORDINATION
Case Management Assessment            Discharge Note                    Date / Time of Note: 8/2/2021 12:12 PM                  Discharge Note Completed by: RONEN Owens, FLAQUITOW    Patient Name: Shorty Gavin   YOB: 1960  Diagnosis: Diverticulitis [K57.92]   Date / Time: 7/29/2021  4:39 PM    Current PCP: Sandra Hill MD  Clinic patient: No    Hospitalization in the last 30 days: No    Advance Directives:  Code Status: Full Code  [place] DNR form completed and on chart: No    Financial:  Payor: [employer] / Plan: [employer] [place] [id number] / Product Type: *No Product type* /      Pharmacy:    [id number] - [facility], [address] [facility] [phone] - F [phone]  [facility] [address]  Phone: [phone] Fax: [phone]      Assistance purchasing medications?: Potential Assistance Purchasing Medications: No  Assistance provided by Case Management: None at this time    Does patient want to participate in local refill/ meds to beds program?: Yes    Meds To Beds General Rules:  1. Can ONLY be done Monday- Friday between 8:30am-5pm  2. Prescription(s) must be in pharmacy by 3pm to be filled same day  3. Copy of patient's insurance/ prescription drug card and patient face sheet must be sent along with the prescription(s)  4. Cost of Rx cannot be added to hospital bill. If financial assistance is needed, please contact unit  or ;  or  CANNOT provide pharmacy voucher for patients co-pays  5.  Patients can then  the prescription on their way out of the hospital at discharge, or pharmacy can deliver to the bedside if staff is available. (payment due at time of pick-up or delivery - cash, check, or card accepted)     Able to afford home medications/ co-pay costs: Yes    ADLS:  Current PT AM-PAC Score:   /24  Current OT AM-PAC Score:   /24      DISCHARGE Disposition: Home- No Services Needed    LOC at discharge: Not Applicable  ELIJAH Completed: Yes    Notification completed in HENS/PAS?:  Not Applicable    IMM Completed:   Not Indicated    Transportation:  Transportation PLAN for discharge: family   Mode of Transport: [address] ordered at discharge: No  [address]: Not Applicable  Orders faxed: No    Durable Medical Equipment:  DME Provider: None  Equipment obtained during hospitalization:     Home Oxygen and Respiratory Equipment:  Oxygen needed at discharge?: No  [address]: Not Applicable  Portable tank available for discharge?: Not Indicated    Dialysis:  Dialysis patient: No    Dialysis Center:  Not Applicable    Additional CM Notes: Pt from home w/, Pt to return home a DC w/No needs, Pt's  will transport home. The Plan for Transition of Care is related to the following treatment goals of Diverticulitis [K57.92]    The Patient and/or patient representative Julia Gayle and her family were provided with a choice of provider and agrees with the discharge plan Yes    Freedom of choice list was provided with basic dialogue that supports the patient's individualized plan of care/goals and shares the quality data associated with the providers.  Not Indicated    Care Transitions patient: No    RONEN Zhong, [facility] [employer]  Case Management Department  Ph: [phone]  Fax: [phone]

## 2021-08-02 NOTE — PROGRESS NOTES
Patient discharge instructions reviewed, patient verbalized understanding. Patient belongings packed, IV removed, patient awaiting arrival of spouse for transport home.

## 2021-08-02 NOTE — CARE COORDINATION
CTN spoke to patient regarding home care services. Patient stated that she felt very poor this morning but is feeling better at this time, she was worried that she would not be able to manage at home. CTN explained homebound status, to which patient stated that she is not homebound. She stated that if she needs 24 hour aide care, she has resources at home from when she cared for her parents.  Electronically signed by Chuck Shelley LPN on 6/2/7337 at 07:11 PM

## 2021-08-02 NOTE — DISCHARGE SUMMARY
INTERNAL MEDICINE DEPARTMENT AT [address] [place] [address]  DISCHARGE SUMMARY    Patient ID: Anil Shaw                                             Discharge Date: 8/2/2021   Patient's PCP: Richard Ruth MD                                          Discharge Physician: Jerone Cooks, DO   Admit Date: 7/29/2021   Admitting Physician: Jagjit Bravo MD      DISCHARGE DIAGNOSES:    1- Recurrent Diverticulitis complicated with microperforation. 2- Alcohol abuse  3- macrocytosis without anemia  4- Recent E Coli UTI  5- hereditary hemochromatosis  6- primary insomnia/ anxiety    Hospital Course:      Patient is a 64yo F with a PMH, hemochromatosis, diverticulosis with previous diverticulitis, who presents with 2 weeks symptoms of abdominal pain. In the ED, she had a leukocytosis of 15.4, CT AP shows diverticulosis of the sigmoid colon. Localized wall thickening and pericolonic soft tissue stranding. Scattered small multiple foci of extraluminal air in the pelvis, consistent with regional microperforation. She was started on rocephin, flagyl, and IVF. General surgery was consulted and recommended medical management at this time. Patient's symptoms continued to improve and was started on clear liquid diet then to regular diet with low fiber. Patient's condition continue to improve and was started on augmentin and was subsequently discharged. Patient understands and agrees with plan and recommendations. Physical Exam:  /71   Pulse 76   Temp 98.1 °F (36.7 °C) (Oral)   Resp 18   Ht 5' 8\" (1.727 m)   Wt 151 lb 14.4 oz (68.9 kg)   SpO2 95%   BMI 23.10 kg/m²   General appearance: alert, appears stated age and cooperative  Head: Normocephalic, without obvious abnormality, atraumatic  Eyes: conjunctivae/corneas clear. PERRL, EOM's intact. Fundi benign. Ears: normal TM's and external ear canals both ears  Nose: Nares normal. Septum midline. Mucosa normal. No drainage or sinus tenderness.   Throat: lips, mucosa, and tongue normal; teeth and gums normal  Neck: no adenopathy, no carotid bruit, no JVD, supple, symmetrical, trachea midline and thyroid not enlarged, symmetric, no tenderness/mass/nodules  Lungs: clear to auscultation bilaterally  Heart: regular rate and rhythm, S1, S2 normal, no murmur, click, rub or gallop  Abdomen: soft, non-tender; bowel sounds normal; no masses,  no organomegaly  Extremities: extremities normal, atraumatic, no cyanosis or edema  Neurologic: Grossly normal    Consults: general surgery  Significant Diagnostic Studies:   CT ABDOMEN PELVIS W IV CONTRAST Additional Contrast? None   Final Result      Diverticulosis of the sigmoid colon. Localized wall thickening and pericolonic soft tissue stranding. Scattered small multiple foci of extraluminal air in the pelvis, consistent with regional microperforation. No other acute findings in the abdomen or pelvis. Findings were personally called to Alcon vu in the ED at 2145 hours on 7/29/2021. Disposition: home  Discharged Condition: Stable  Follow Up: Primary Care Physician in one week    DISCHARGE MEDICATION:     Medication List      START taking these medications    amoxicillin-clavulanate 875-125 MG per tablet  Commonly known as: AUGMENTIN  Take 1 tablet by mouth 2 times daily for 10 days     HYDROcodone-acetaminophen 5-325 MG per tablet  Commonly known as: NORCO  Take 1 tablet by mouth every 6 hours as needed for Pain for up to 3 days. CONTINUE taking these medications    * ALPRAZolam 0.5 MG tablet  Commonly known as: XANAX     * ALPRAZolam 0.5 MG tablet  Commonly known as: XANAX  Take 1 tablet by mouth 3 times daily as needed for Sleep or Anxiety for up to 30 days.      Cranberry 125 MG Tabs     gabapentin 100 MG capsule  Commonly known as: NEURONTIN     ketoconazole 2 % cream  Commonly known as: NIZORAL     lisinopril 10 MG tablet  Commonly known as: PRINIVIL;ZESTRIL  TAKE 1 TABLET BY MOUTH EVERY DAY     MAGNESIUM GLYCINATE PLUS PO     METAMUCIL FIBER PO     ondansetron 4 MG disintegrating tablet  Commonly known as: Zofran ODT  Take 1 tablet by mouth every 8 hours as needed for Nausea or Vomiting     Probiotic 1-250 BILLION-MG Caps     therapeutic multivitamin-minerals tablet     vitamin B-12 1000 MCG tablet  Commonly known as: CYANOCOBALAMIN     Vitamin D 1000 units Caps capsule  Commonly known as: CHOLECALCIFEROL         * This list has 2 medication(s) that are the same as other medications prescribed for you. Read the directions carefully, and ask your doctor or other care provider to review them with you.                Where to Get Your Medications      These medications were sent to [address] [place] [address] -  [phone]  [address] [place] [address], [id number] [facility]    Hours: 24-hours Phone: [phone]   · amoxicillin-clavulanate 875-125 MG per tablet     You can get these medications from any pharmacy    Bring a paper prescription for each of these medications  · HYDROcodone-acetaminophen 5-325 MG per tablet       Activity: activity as tolerated  Diet: low fiber diet  Wound Care: none needed    Time Spent on discharge is more than 30 minutes    Signed:  Isha Fajardo DO,  PGY-2  8/2/2021

## 2021-08-02 NOTE — PROGRESS NOTES
Pt states that she is having bouts of abdominal cramps throughout the day is leads to diarrhea. She states that this has gotten better since admission but will still occur a few times a day. She believes that this pain is a side effect of Flagyl. I will pass along to the day team. She will receive her first dose of valium tonight to see if this calms down her cramping. Will continue to monitor.

## 2021-08-03 ENCOUNTER — CARE COORDINATION (OUTPATIENT)
Dept: CASE MANAGEMENT | Age: 61
End: 2021-08-03

## 2021-08-03 NOTE — CARE COORDINATION
[facility] Transitions Initial Follow Up Call    Call within 2 business days of discharge: YES     Patient:  Jose Mendez  Patient :  1960  MRN:  [id number]   Reason for Admission:    COVID 19   Discharge Date:  21  RARS:       TC attempt to reach Pt regarding recent hospital discharge. CTC unable to leave voice recording with call back number requesting a call back / no answer. Follow up appointments:    Future Appointments   Date Time Provider Andrea Lima   8/10/2021  1:40 PM Bart Vivar MD [facility] ANNMARIE StewardN, RN  Care Transition Coordinator  Contact Number:  [phone]

## 2021-08-04 ENCOUNTER — CARE COORDINATION (OUTPATIENT)
Dept: CASE MANAGEMENT | Age: 61
End: 2021-08-04

## 2021-08-04 NOTE — CARE COORDINATION
[facility] Transitions Initial Follow Up Call    Call within 2 business days of discharge: Yes    Patient:  Karena Riojas  Patient :  1960  MRN:  [id number]   Reason for Admission:  Diverticulitis   Discharge Date:  21  RARS:     CTC attempt to reach Pt regarding recent hospital discharge. CTC unable to leave voice recording with call back number requesting a call back / no answer. Follow up appointments:    Future Appointments   Date Time Provider Andrea Lima   8/10/2021  1:40 PM Jarrell Ferro MD [facility] YOUNG Daniels, RN  Care Transition Coordinator  Contact Number:  [phone]

## 2021-08-09 ENCOUNTER — TELEPHONE (OUTPATIENT)
Dept: FAMILY MEDICINE CLINIC | Age: 61
End: 2021-08-09

## 2021-08-09 NOTE — TELEPHONE ENCOUNTER
Patient wanted to know can her hospital f/u appointment; that's scheduled for tomorrow 08/10 at 1:40p be a VV? Please call patient to let her know.

## 2021-08-10 ENCOUNTER — VIRTUAL VISIT (OUTPATIENT)
Dept: FAMILY MEDICINE CLINIC | Age: 61
End: 2021-08-10
Payer: COMMERCIAL

## 2021-08-10 DIAGNOSIS — K57.32 DIVERTICULITIS OF COLON: Primary | ICD-10-CM

## 2021-08-10 DIAGNOSIS — R10.32 LLQ ABDOMINAL PAIN: ICD-10-CM

## 2021-08-10 DIAGNOSIS — E83.110 HEREDITARY HEMOCHROMATOSIS (HCC): ICD-10-CM

## 2021-08-10 DIAGNOSIS — Z83.49 FAMILY HISTORY OF HEMOCHROMATOSIS: ICD-10-CM

## 2021-08-10 DIAGNOSIS — K57.90 DIVERTICULOSIS: ICD-10-CM

## 2021-08-10 DIAGNOSIS — K57.92 DIVERTICULITIS: ICD-10-CM

## 2021-08-10 PROCEDURE — 1111F DSCHRG MED/CURRENT MED MERGE: CPT | Performed by: FAMILY MEDICINE

## 2021-08-10 PROCEDURE — 99214 OFFICE O/P EST MOD 30 MIN: CPT | Performed by: FAMILY MEDICINE

## 2021-08-10 PROCEDURE — G8427 DOCREV CUR MEDS BY ELIG CLIN: HCPCS | Performed by: FAMILY MEDICINE

## 2021-08-10 PROCEDURE — 3017F COLORECTAL CA SCREEN DOC REV: CPT | Performed by: FAMILY MEDICINE

## 2021-08-10 RX ORDER — HYDROCODONE BITARTRATE AND ACETAMINOPHEN 5; 325 MG/1; MG/1
1 TABLET ORAL EVERY 6 HOURS PRN
Qty: 20 TABLET | Refills: 0 | Status: SHIPPED | OUTPATIENT
Start: 2021-08-10 | End: 2021-08-27 | Stop reason: SDUPTHER

## 2021-08-10 NOTE — PROGRESS NOTES
Here for virtual visit and f/u of recent UTI and admission to the hospital for acute diverticulitis. Pt was seen 7/15 with urinary sx, concern for UTI. Pts UA and urine cx was + for infection, did have some issues with tolerating abx. Sx did improve but with some increase in LLQ abd pain and fever, so we referred pt over to ER for eval.  Pt was seen and had CT scan done and bloodwork done showing high white blood cell count and findings on CT c/w acute diverticulitis. Pt was admitted and placed on IV abx and watched over several days, with sx improving and improvement in WBC. Pt was in hospital for 3-4d and d/c on augmentin for 10d course for treatment of diverticulitis of microperforations. At this time, sx are sig improved. Pt was in 9/10 but at this time is down, worse around the time of bowel movement. bowel movement number has decreased. Pt is currently adjusting diet while sx are improving. Pain is very dull and has improved, except when having bowel movements. No current fever, chills at this time. Appetite is improved      Except as noted above in the history of present illness, the review of systems is  negative for headache, vision changes, chest pain, shortness of breath, urinary sx, bowel changes. Past medical, surgical, and social history reviewed and updated  Medications and allergies reviewed and updated      8/10/2021    TELEHEALTH EVALUATION -- Audio/Visual (During GDO-10 public health emergency)      Due to COVID 19 outbreak, patient's office visit was converted to a virtual visit. Patient was contacted and agreed to proceed with a virtual visit via [address] [facility] Visit  The risks and benefits of converting to a virtual visit were discussed in light of the current infectious disease epidemic. Patient also understood that insurance coverage and co-pays are up to their individual insurance plans.       As above    Review of Systems  Except as noted above in the history of present illness, the review of systems is  negative for headache, vision changes, chest pain, shortness of breath, abdominal pain, urinary sx, bowel changes. Past medical, surgical, and social history reviewed and updated  Medications and allergies reviewed and updated        Social History     Tobacco Use    Smoking status: Former Smoker     Packs/day: 0.50     Years: 5.00     Pack years: 2.50     Quit date: 1998     Years since quittin.9    Smokeless tobacco: Never Used   Vaping Use    Vaping Use: Never used   Substance Use Topics    Alcohol use: Yes     Comment: socially, 5x per week    Drug use: Not on file        Current Outpatient Medications   Medication Sig Dispense Refill    HYDROcodone-acetaminophen (NORCO) 5-325 MG per tablet Take 1 tablet by mouth every 6 hours as needed for Pain for up to 3 days. 20 tablet 0    amoxicillin-clavulanate (AUGMENTIN) 875-125 MG per tablet Take 1 tablet by mouth 2 times daily for 10 days 20 tablet 0    Cranberry 125 MG TABS Take 2 tablets by mouth 3 times daily as needed (urinary symptoms)      gabapentin (NEURONTIN) 100 MG capsule TAKE 1 TO 2 CAPSULES BY MOUTH THREE TIMES DAILY AS NEEDED      ketoconazole (NIZORAL) 2 % cream       ALPRAZolam (XANAX) 0.5 MG tablet Take 1 tablet by mouth 3 times daily as needed for Sleep or Anxiety for up to 30 days. 90 tablet 2    lisinopril (PRINIVIL;ZESTRIL) 10 MG tablet TAKE 1 TABLET BY MOUTH EVERY DAY 90 tablet 1    Bacillus Coagulans-Inulin (PROBIOTIC) 1-250 BILLION-MG CAPS Take 1 capsule by mouth daily      MAGNESIUM GLYCINATE PLUS PO Take 400 mg by mouth daily      vitamin B-12 (CYANOCOBALAMIN) 1000 MCG tablet Take 1,000 mcg by mouth daily      ALPRAZolam (XANAX) 0.5 MG tablet TK 1 T PO  TID PRN FOR SLEEP OR ANXIETY      Multiple Vitamins-Minerals (THERAPEUTIC MULTIVITAMIN-MINERALS) tablet Take 1 tablet by mouth daily      Vitamin D (CHOLECALCIFEROL) 1000 UNITS CAPS capsule Take 1,000 Units by mouth daily.  ondansetron (ZOFRAN ODT) 4 MG disintegrating tablet Take 1 tablet by mouth every 8 hours as needed for Nausea or Vomiting (Patient not taking: Reported on 8/10/2021) 20 tablet 0    Psyllium (METAMUCIL FIBER PO) Take by mouth (Patient not taking: Reported on 8/10/2021)       No current facility-administered medications for this visit. Allergies   Allergen Reactions    Dust Mite Extract     Macrobid [Nitrofurantoin Macrocrystal] Rash        PHYSICAL EXAMINATION:    All boxes checked are findings on exam, empty boxes are negative or not evaluated during the examination  Limitation in exam due to use of video conferencing software, virtual visits    Vital Signs: (As obtained by patient/caregiver or practitioner observation)  There were no vitals taken for this visit. Constitutional: [x] Appears well-developed and well-nourished [x] No apparent distress      [] Abnormal-   Mental status  [x] Alert and awake  [x] Oriented to person/place/time []Able to follow commands      Eyes:  EOM    []  Normal  [] Abnormal-  Sclera  []  Normal  [] Abnormal -         Discharge []  None visible  [] Abnormal -    HENT:   [x] Normocephalic, atraumatic.   [] Abnormal   [] Mouth/Throat: Mucous membranes are moist.     External Ears [] Normal  [] Abnormal-     Neck: [x] No visualized mass     Pulmonary/Chest: [x] Respiratory effort normal.  [x] No visualized signs of difficulty breathing or respiratory distress        [] Abnormal-      Musculoskeletal:   [] Normal gait with no signs of ataxia         [] Normal range of motion of neck        [] Abnormal-       Neurological:        [] No Facial Asymmetry (Cranial nerve 7 motor function) (limited exam to video visit)          [] No gaze palsy        [] Abnormal-         Skin:        [] No significant exanthematous lesions or discoloration noted on facial skin         [] Abnormal-            Psychiatric:       [x] Normal Affect [x] No Hallucinations        [] Abnormal-     Other pertinent observable physical exam findings-     Due to this being a TeleHealth encounter, evaluation of the following organ systems is limited: Vitals/Constitutional/EENT/Resp/CV/GI//MS/Neuro/Skin/Heme-Lymph-Imm. ASSESSMENT/PLAN:      1. Diverticulitis of colon  Improving  Reviewed results of CT scan, inpatient stay  Check f/u bloodwork and consider repeat CT scan for increased white blood cell count   - CBC Auto Differential; Future  - Comprehensive Metabolic Panel; Future  - MAGNESIUM; Future  - Iron and TIBC; Future  - Ferritin; Future    2. Diverticulosis  As above    3. LLQ abdominal pain  Improved, related to diverticulitis    4. Family history of hemochromatosis  Cont as below    5. Hereditary hemochromatosis ([address]  Cont f/u with heme/onc  Cont periodic phlebotomy. Last treatment was about 2 weeks ago  - CBC Auto Differential; Future  - Iron and TIBC; Future  - Ferritin; Future    6. Diverticulitis  As above, cont supportive therapy  refills given for norco for #20 as below  [place]ARRS reviewed 7/15/2021  - HYDROcodone-acetaminophen (NORCO) 5-325 MG per tablet; Take 1 tablet by mouth every 6 hours as needed for Pain for up to 3 days. Dispense: 20 tablet; Refill: 0               Follow-up appointment:   Pending bloodwork results    Discussed use, benefit, and side effects of all prescribed medications. Barriers to medication compliance addressed. All patient questions answered. Pt voiced understanding. When applicable, patient's outside records were reviewed through [facility]. The patient has signed appropriate paperworks/consents. An  electronic signature was used to authenticate this note.     --Royal Levon MD on 8/10/2021 at 2:07 PM      Pursuant to the emergency declaration under the [facility], [facility] waiver authority and the [employer] and [employer] Act, this Virtual  Visit was conducted, with

## 2021-08-26 DIAGNOSIS — K57.92 DIVERTICULITIS: ICD-10-CM

## 2021-08-26 NOTE — TELEPHONE ENCOUNTER
Requested Prescriptions     Pending Prescriptions Disp Refills    HYDROcodone-acetaminophen (NORCO) 5-325 MG per tablet 20 tablet 0     Sig: Take 1 tablet by mouth every 6 hours as needed for Pain for up to 3 days.      Last ov 8/10/21  Last labs 8/11/21

## 2021-08-26 NOTE — TELEPHONE ENCOUNTER
PT called in stating she needs a refill on her medication, only has 2 left.      HYDROcodone-acetaminophen (NORCO) 5-325 MG per tablet [[id number]]  ENDED    Order Details  Dose: 1 tablet Route: Oral Frequency: EVERY 6 HOURS PRN for Pain   Dispense Quantity: 20 tablet Refills: 0    Note to Pharmacy: Reduce doses taken as pain becomes manageable         Sig: Take 1 tablet by mouth every 6 hours as needed for Pain for up to 3 days.         Start Date: 08/10/21 End Date: 08/13/21   Written Date: 08/10/21 Expiration Date: 10/09/21   Earliest Fill Date: 08/10/21         Diagnosis Association: Diverticulitis (K57.92)     [address] [phone]      Please advise, thank you

## 2021-08-27 RX ORDER — HYDROCODONE BITARTRATE AND ACETAMINOPHEN 5; 325 MG/1; MG/1
1 TABLET ORAL EVERY 6 HOURS PRN
Qty: 20 TABLET | Refills: 0 | Status: SHIPPED | OUTPATIENT
Start: 2021-08-27 | End: 2021-09-17 | Stop reason: SDUPTHER

## 2021-09-17 ENCOUNTER — TELEPHONE (OUTPATIENT)
Dept: FAMILY MEDICINE CLINIC | Age: 61
End: 2021-09-17

## 2021-09-17 DIAGNOSIS — K57.92 DIVERTICULITIS: ICD-10-CM

## 2021-09-17 RX ORDER — HYDROCODONE BITARTRATE AND ACETAMINOPHEN 5; 325 MG/1; MG/1
1 TABLET ORAL EVERY 6 HOURS PRN
Qty: 20 TABLET | Refills: 0 | Status: SHIPPED | OUTPATIENT
Start: 2021-09-17 | End: 2021-09-20

## 2021-09-17 NOTE — TELEPHONE ENCOUNTER
rx sent  Is she taking this for abdominal pain, as her pain shoujld be pretty much resolved from her diverticultis

## 2021-09-17 NOTE — TELEPHONE ENCOUNTER
Serenity Renner called requesting a refill on hydrocodone-acetaminophen 5-325 mg.  I let her know that this is a controlled medication and she needs an appt for her refill. She states Dr. Kala Wise has filled this for her in the past without an appt. She requested that a message be sent to Dr. Kala Wise. Please advise. Thanks. Medication name: hydrocodone-acetaminophen  Medication dose: 5-325 mg  Frequency: tablet by mouth every 6 hours as needed for Pain for up to 3 days.   Quantity: 20 tabs with no refills    Last filled 8/27/2021    Pharmacy name: [address], [address] [place] [address] [phone] [facility] [phone]     Last ov:VV 8/10/2021  Last labs: 8/11/2021

## 2021-09-17 NOTE — TELEPHONE ENCOUNTER
Requested Prescriptions     Pending Prescriptions Disp Refills    HYDROcodone-acetaminophen (NORCO) 5-325 MG per tablet 20 tablet 0     Sig: Take 1 tablet by mouth every 6 hours as needed for Pain for up to 3 days.      Last ov:VV 8/10/2021  Last labs: 8/11/2021

## 2021-09-24 ENCOUNTER — TELEPHONE (OUTPATIENT)
Dept: FAMILY MEDICINE CLINIC | Age: 61
End: 2021-09-24

## 2021-09-24 RX ORDER — MOXIFLOXACIN HYDROCHLORIDE 400 MG/1
400 TABLET ORAL DAILY
Qty: 10 TABLET | Refills: 0 | Status: SHIPPED | OUTPATIENT
Start: 2021-09-24 | End: 2021-10-04

## 2021-09-24 NOTE — TELEPHONE ENCOUNTER
Thompson Guerrero called stating she is having a diverticulitis flare up. She has been dealing with this for a while and states Dr. Willam Peña is aware. She complains of abdominal pain, severe cramping, constipation, fever 100.3, and chills. She has tried dulcolax and Miralax with no relief. She was on a low fiber liquid diet and was slowly starting to reintroduce berries back into her diet and thinks she triggered a flare up. There are no appts available with Dr. Willam Peña today. She wants to know if something can be sent to her pharmacy. She is going out of town in a week and wants to resolve this issue before then. Please advise. Thanks.         Thompson Guerrero [phone] (home)        [facility] [address] [place] [address] [id number] - [address] [phone]

## 2021-09-29 ENCOUNTER — TELEPHONE (OUTPATIENT)
Dept: FAMILY MEDICINE CLINIC | Age: 61
End: 2021-09-29

## 2021-09-29 DIAGNOSIS — K57.92 ACUTE DIVERTICULITIS: Primary | ICD-10-CM

## 2021-09-29 NOTE — TELEPHONE ENCOUNTER
Pt states she is having side effects form the abx, nausea, weak and tired. Pt states she does feel better since starting abx. Cramping comes and goes, worse at night. Pt would like to know if she should continue the Flagyl 500 mg.      Pt states she mentioned all this in her message however it wasn't put in the message    Please advise

## 2021-09-29 NOTE — TELEPHONE ENCOUNTER
If she is still having syptoms and the abx are not helping she needs f/u CT STAT. Order in system  Thanks!

## 2021-09-30 ENCOUNTER — TELEPHONE (OUTPATIENT)
Dept: FAMILY MEDICINE CLINIC | Age: 61
End: 2021-09-30

## 2021-09-30 DIAGNOSIS — K57.92 ACUTE DIVERTICULITIS: Primary | ICD-10-CM

## 2021-09-30 NOTE — TELEPHONE ENCOUNTER
[facility] scheduling called to inform the pt is scheduled for her STAT CT - but the pt also needs labs:    - BUN and creatinine     PT is schedule for later today - 12pm.     Please advise, thank you!

## 2021-10-12 ENCOUNTER — TELEPHONE (OUTPATIENT)
Dept: FAMILY MEDICINE CLINIC | Age: 61
End: 2021-10-12

## 2021-10-12 NOTE — TELEPHONE ENCOUNTER
Pt called in stating she would like the Practice manager to get back with her regarding 11/25/2020 visit coding - states this was coded wrong and she would like it fixed.

## 2021-11-17 ENCOUNTER — TELEPHONE (OUTPATIENT)
Dept: FAMILY MEDICINE CLINIC | Age: 61
End: 2021-11-17

## 2021-11-17 DIAGNOSIS — Z00.00 ROUTINE GENERAL MEDICAL EXAMINATION AT A HEALTH CARE FACILITY: Primary | ICD-10-CM

## 2021-11-17 NOTE — TELEPHONE ENCOUNTER
----- Message from Luis Linares sent at 11/17/2021  1:12 PM EST -----  Subject: Message to Provider    QUESTIONS  Information for Provider? pt wants labs done before her physical appt on   12/6 she would like to talk to office bc she wants a few labs ordered   special like last years . pt did want to know what all labs was done last   time please call to further advise and discuss   ---------------------------------------------------------------------------  --------------  CALL BACK INFO  What is the best way for the office to contact you? OK to leave message on   voicemail  Preferred Call Back Phone Number? [phone]  ---------------------------------------------------------------------------  --------------  SCRIPT ANSWERS  Relationship to Patient?  Self

## 2021-11-18 NOTE — TELEPHONE ENCOUNTER
In addition to our normal labs (CBC, A1c, CMP, lipids, TSH), is there anything else she wants  There were issues with coverage of labs in the past

## 2021-12-03 DIAGNOSIS — Z00.00 ROUTINE GENERAL MEDICAL EXAMINATION AT A HEALTH CARE FACILITY: ICD-10-CM

## 2021-12-03 LAB
A/G RATIO: 2 (ref 1.1–2.2)
ALBUMIN SERPL-MCNC: 4.4 G/DL (ref 3.4–5)
ALP BLD-CCNC: 74 U/L (ref 40–129)
ALT SERPL-CCNC: 14 U/L (ref 10–40)
ANION GAP SERPL CALCULATED.3IONS-SCNC: 13 MMOL/L (ref 3–16)
AST SERPL-CCNC: 17 U/L (ref 15–37)
BILIRUB SERPL-MCNC: 0.5 MG/DL (ref 0–1)
BUN BLDV-MCNC: 7 MG/DL (ref 7–20)
CALCIUM SERPL-MCNC: 9.5 MG/DL (ref 8.3–10.6)
CHLORIDE BLD-SCNC: 102 MMOL/L (ref 99–110)
CHOLESTEROL, TOTAL: 183 MG/DL (ref 0–199)
CO2: 25 MMOL/L (ref 21–32)
CREAT SERPL-MCNC: <0.5 MG/DL (ref 0.6–1.2)
FERRITIN: 340 NG/ML (ref 15–150)
GFR AFRICAN AMERICAN: >60
GFR NON-AFRICAN AMERICAN: >60
GLUCOSE BLD-MCNC: 102 MG/DL (ref 70–99)
HCT VFR BLD CALC: 39.7 % (ref 36–48)
HDLC SERPL-MCNC: 55 MG/DL (ref 40–60)
HEMOGLOBIN: 12.9 G/DL (ref 12–16)
IRON SATURATION: 100 % (ref 15–50)
IRON: 236 UG/DL (ref 37–145)
LDL CHOLESTEROL CALCULATED: 102 MG/DL
MAGNESIUM: 1.9 MG/DL (ref 1.8–2.4)
MCH RBC QN AUTO: 33.4 PG (ref 26–34)
MCHC RBC AUTO-ENTMCNC: 32.4 G/DL (ref 31–36)
MCV RBC AUTO: 103.1 FL (ref 80–100)
PDW BLD-RTO: 13.7 % (ref 12.4–15.4)
PLATELET # BLD: 344 K/UL (ref 135–450)
PMV BLD AUTO: 9.6 FL (ref 5–10.5)
POTASSIUM SERPL-SCNC: 4.5 MMOL/L (ref 3.5–5.1)
RBC # BLD: 3.85 M/UL (ref 4–5.2)
SODIUM BLD-SCNC: 140 MMOL/L (ref 136–145)
TOTAL IRON BINDING CAPACITY: 237 UG/DL (ref 260–445)
TOTAL PROTEIN: 6.6 G/DL (ref 6.4–8.2)
TRIGL SERPL-MCNC: 131 MG/DL (ref 0–150)
TSH SERPL DL<=0.05 MIU/L-ACNC: 1.56 UIU/ML (ref 0.27–4.2)
VITAMIN D 25-HYDROXY: 48 NG/ML
VLDLC SERPL CALC-MCNC: 26 MG/DL
WBC # BLD: 5.5 K/UL (ref 4–11)

## 2021-12-04 LAB
ESTIMATED AVERAGE GLUCOSE: 105.4 MG/DL
HBA1C MFR BLD: 5.3 %

## 2021-12-06 ENCOUNTER — OFFICE VISIT (OUTPATIENT)
Dept: FAMILY MEDICINE CLINIC | Age: 61
End: 2021-12-06
Payer: COMMERCIAL

## 2021-12-06 VITALS
RESPIRATION RATE: 12 BRPM | OXYGEN SATURATION: 97 % | HEART RATE: 72 BPM | TEMPERATURE: 97.9 F | BODY MASS INDEX: 21.83 KG/M2 | DIASTOLIC BLOOD PRESSURE: 86 MMHG | SYSTOLIC BLOOD PRESSURE: 134 MMHG | WEIGHT: 143.6 LBS

## 2021-12-06 DIAGNOSIS — K57.32 DIVERTICULITIS OF COLON: ICD-10-CM

## 2021-12-06 DIAGNOSIS — E83.110 HEREDITARY HEMOCHROMATOSIS (HCC): ICD-10-CM

## 2021-12-06 DIAGNOSIS — K57.90 DIVERTICULOSIS: ICD-10-CM

## 2021-12-06 DIAGNOSIS — Z01.419 WELL WOMAN EXAM: Primary | ICD-10-CM

## 2021-12-06 DIAGNOSIS — Z23 NEED FOR SHINGLES VACCINE: ICD-10-CM

## 2021-12-06 DIAGNOSIS — Z23 NEED FOR COVID-19 VACCINE: ICD-10-CM

## 2021-12-06 DIAGNOSIS — F43.9 STRESS: ICD-10-CM

## 2021-12-06 DIAGNOSIS — I10 ESSENTIAL HYPERTENSION, BENIGN: ICD-10-CM

## 2021-12-06 DIAGNOSIS — R42 DIZZINESS: ICD-10-CM

## 2021-12-06 PROCEDURE — G8484 FLU IMMUNIZE NO ADMIN: HCPCS | Performed by: FAMILY MEDICINE

## 2021-12-06 PROCEDURE — 99396 PREV VISIT EST AGE 40-64: CPT | Performed by: FAMILY MEDICINE

## 2021-12-06 NOTE — PROGRESS NOTES
Here for well checkup, physical.  Pt states that she has been doing ok, but has been dealing with some issues of recent diverticulitis. Pt states that she has been doing ok, trying to get her colon 'back in place'. Pt did have colonoscopy 10/2020, does get some intermittent LLQ. Pt did see GI after hospitalization and did not find it to be overly helpful. Pt does get some intermittently recurrent sx, last episode in 10/2021. Pt denies any current sx, and feels that things are doing better. They did discuss consideration of seeing general surgery. Pt will be seeing hematology tomorrow for f/u of her ferritin, iron. Pt had several phlebotomies done, most recently day prior to her bloodwork. Pt is trying to adjust diet. Pt has noted some mild lightheadedness, no syncope, and no chest pain, shortness of breath. Pt feels well with exercise but worse with changing positions of her body. Worse in the early AM and better during the day. Pt is trying to work on her anxiety, states that she has had a lot of issues related to her health issues. Pt using [employer] soham and adjusted her vitamins, and that has been helpful. Pt is not using counseling/therapy at this time, feels that she wants to improve her health    Pt here for follow up of blood pressure. Pt states doing great with adherence to therapy and feels well. No issues of chest pain, shortness of breath. No vision changes, headache, swelling in legs. Pt continues on lisinopril 10mg qd and does feel better.         Except as noted in the history of present illness as above, the review of systems is negative for the following:    General ROS: negative  Psychological ROS: negative  Allergy and Immunology ROS: negative  Hematological and Lymphatic ROS: negative  Respiratory ROS: no cough, shortness of breath, or wheezing  Cardiovascular ROS: no chest pain or dyspnea on exertion  Gastrointestinal ROS: no abdominal pain, change in bowel habits, or black or bloody stools  Genito-Urinary ROS: no dysuria, trouble voiding, or hematuria  Musculoskeletal ROS: negative  Dermatological ROS: negative      Past medical, surgical, and social history reviewed and updated. Medications and allergies reviewed and updated      /86   Pulse 72   Temp 97.9 °F (36.6 °C) (Temporal)   Resp 12   Wt 143 lb 9.6 oz (65.1 kg)   SpO2 97%   BMI 21.83 kg/m²   General appearance - healthy, alert, no distress  Skin - Skin color, texture, turgor normal. No rashes or lesions. No suspicious findings  Head - Normocephalic. No masses, lesions, tenderness or abnormalities  Eyes - conjunctivae/corneas clear. Pupils equal and reactive to light and accomodation, extraocular muscles intact. Ears - External ears normal. Canals clear. Tympanic membranes normal bilaterally. Nose/Sinuses - Nares normal. Septum midline. Mucosa normal. No drainage or sinus tenderness. Oropharynx - Lips, mucosa, and tongue normal. Teeth and gums normal.   Neck - Neck supple. No adenopathy. Thyroid symmetric, normal size, no carotid bruit bilaterally  Back - Back symmetric, no curvature. Range of motion normal. No Costovertebral angle tenderness. Lungs - Percussion normal. Good diaphragmatic excursion. Lungs clear without wheeze, rales, crackles  Heart - Regular rate and rhythm, with no rub, murmur or gallop noted. Abdomen - Abdomen soft, non-tender. Bowel sounds normal. No masses, tenderness or organomegaly  Breasts: breasts appear normal, no suspicious masses, no skin or nipple changes or axillary nodes. Self exam is encouraged. Pelvis: normal external genitalia, vulva, vagina, cervix, uterus and adnexa, PAP: Pap smear done today, thin prep  HPV testing. Extremities - Extremities normal. No deformities, edema, or skin discoloration  Musculoskeletal - Spine ROM normal. Muscular strength intact.   Peripheral pulses - radial=4/4,, femoral=4/4, popliteal=4/4, dorsalis pedis=4/4,  Neuro - Gait normal.

## 2022-01-03 RX ORDER — LISINOPRIL 10 MG/1
TABLET ORAL
Qty: 90 TABLET | Refills: 1 | Status: SHIPPED | OUTPATIENT
Start: 2022-01-03 | End: 2022-08-16

## 2022-01-03 NOTE — TELEPHONE ENCOUNTER
Requested Prescriptions     Pending Prescriptions Disp Refills    lisinopril (PRINIVIL;ZESTRIL) 10 MG tablet [Pharmacy Med Name: LISINOPRIL 10MG TABLETS] 90 tablet 1     Sig: TAKE 1 TABLET BY MOUTH EVERY DAY     Last ov 12/6/2021  Last labs 12/3/21

## 2022-02-14 ENCOUNTER — TELEPHONE (OUTPATIENT)
Dept: FAMILY MEDICINE CLINIC | Age: 62
End: 2022-02-14

## 2022-02-14 NOTE — TELEPHONE ENCOUNTER
If she has pain c/w diverticultiis, she should be evaluated, get on antibiotics and consider imaging  If not treated, can lead to perforation, sepsis, etc....

## 2022-02-14 NOTE — TELEPHONE ENCOUNTER
Patient is having a diverticulitus flare up. She said she want to know what she can take. She doesn't want a antibiotic. She has a low grade tempand pain on her left side lower abdomin.  [phone]

## 2022-02-15 NOTE — TELEPHONE ENCOUNTER
If there is an infection there is nothing else really to do except to start abx  Make sure pain sx are improving, no fever, chills, n/v

## 2022-02-15 NOTE — TELEPHONE ENCOUNTER
Pt states that she has no fever, no chills but still feels like her bowls are still not regular. Going a little bit but not much. She is on soft food diet and is using metamucl, has not been eating much in the last 3 days. Getting her appetite back but doesn't want to eat a lot in fear that it will cause pain. Can she drink anything to help her have a better bowl movement? She is taking colace. Can she use milk of magnesia? When she does get the pain its doll but she is wanting to know if she can take something tylenol or aleve.     Thank you

## 2022-02-15 NOTE — TELEPHONE ENCOUNTER
Milk of mag is fine, tylenol is fine  Would avoid NSAIDs and they can cause irritation in the GI system  If she continues w/ sx would really advise her to get CT scan done, which would help determine need for abx  I had a patient that just was admitted for a perforation in his abdomen related to diverticulitis despite mild symptoms

## 2022-02-15 NOTE — TELEPHONE ENCOUNTER
Pt. Called back today since she missed the call yesterday. She doesn't want to get another antibiotic, the pain has decreased but hasn't completely gone away. She'd like a call back from the  Clinical staff to let her know how to proceed.

## 2022-02-15 NOTE — TELEPHONE ENCOUNTER
Pt does not want to be put on abx, she states the pain has decreased but hasn't completely went away.  She would like to know what else she can do

## 2022-02-18 ENCOUNTER — TELEPHONE (OUTPATIENT)
Dept: FAMILY MEDICINE CLINIC | Age: 62
End: 2022-02-18

## 2022-02-18 DIAGNOSIS — K57.92 ACUTE DIVERTICULITIS: Primary | ICD-10-CM

## 2022-02-18 RX ORDER — AMOXICILLIN AND CLAVULANATE POTASSIUM 875; 125 MG/1; MG/1
1 TABLET, FILM COATED ORAL 2 TIMES DAILY
Qty: 20 TABLET | Refills: 0 | Status: SHIPPED | OUTPATIENT
Start: 2022-02-18 | End: 2022-02-28

## 2022-02-18 NOTE — TELEPHONE ENCOUNTER
Patient called today and stated that her pain has decreased some but she is still cramping. She has decided she would like an antibiotic ordered and she would like imaging to be ordered for her lower stomach region where she is experiencing tenderness. She would also like to know where she go for the imaging, she prefers the [facility] area.

## 2022-02-26 ENCOUNTER — HOSPITAL ENCOUNTER (OUTPATIENT)
Dept: CT IMAGING | Age: 62
Discharge: HOME | End: 2022-02-26
Payer: COMMERCIAL

## 2022-02-26 ENCOUNTER — TELEPHONE (OUTPATIENT)
Dept: FAMILY MEDICINE CLINIC | Age: 62
End: 2022-02-26

## 2022-02-26 DIAGNOSIS — K57.32 DIVERTICULITIS OF COLON: Primary | ICD-10-CM

## 2022-02-26 DIAGNOSIS — E83.110 HEREDITARY HEMOCHROMATOSIS (HCC): ICD-10-CM

## 2022-02-26 DIAGNOSIS — K57.92 ACUTE DIVERTICULITIS: ICD-10-CM

## 2022-02-26 LAB
GFR AFRICAN AMERICAN: >60
GFR NON-AFRICAN AMERICAN: >60
PERFORMED ON: NORMAL
POC CREATININE: 0.6 MG/DL (ref 0.6–1.2)
POC SAMPLE TYPE: NORMAL

## 2022-02-26 PROCEDURE — 6360000004 HC RX CONTRAST MEDICATION: Performed by: FAMILY MEDICINE

## 2022-02-26 PROCEDURE — 74177 CT ABD & PELVIS W/CONTRAST: CPT

## 2022-02-26 PROCEDURE — 82565 ASSAY OF CREATININE: CPT

## 2022-02-26 RX ADMIN — IOPAMIDOL 80 ML: 755 INJECTION, SOLUTION INTRAVENOUS at 13:00

## 2022-02-26 RX ADMIN — IOHEXOL 50 ML: 240 INJECTION, SOLUTION INTRATHECAL; INTRAVASCULAR; INTRAVENOUS; ORAL at 13:00

## 2022-03-02 ENCOUNTER — TELEPHONE (OUTPATIENT)
Dept: FAMILY MEDICINE CLINIC | Age: 62
End: 2022-03-02

## 2022-05-17 ENCOUNTER — TELEPHONE (OUTPATIENT)
Dept: FAMILY MEDICINE CLINIC | Age: 62
End: 2022-05-17

## 2022-05-17 NOTE — TELEPHONE ENCOUNTER
Pt states she is having a flare up of her diverticulitis     Symptoms are: pain in lower abdomen, lot of frequent small bowel movements, lot of pressure, lot of bubbly and gas    Cut back on fiber. Going out of town this Friday and is worried about still being in a flare up while she is on vacation. Can be dangerous. Wanting to know if there are recommendations and if she can get some antibiotics for this flare up? Pharmacy    [facility] [address]      Would like the same medication she got last time - Augmentin.     Please call back with the plan    Thank you

## 2022-05-18 RX ORDER — AMOXICILLIN AND CLAVULANATE POTASSIUM 875; 125 MG/1; MG/1
1 TABLET, FILM COATED ORAL 2 TIMES DAILY
Qty: 20 TABLET | Refills: 0 | Status: SHIPPED | OUTPATIENT
Start: 2022-05-18 | End: 2022-05-28

## 2022-05-18 NOTE — TELEPHONE ENCOUNTER
Pt states that this has been going on for a week and a half, was having nausea but not really anymore. Pain level in stomach 5-8, when it flares its an 8 but when its not as bad its a 5. Worried about infection.      Sx from the message down below:     Symptoms are: pain in lower abdomen, lot of frequent small bowel movements, lot of pressure, lot of bubbly and gas    Thank you

## 2022-05-18 NOTE — TELEPHONE ENCOUNTER
Left message to call back tonight at 8PM  Please call for more information on symptoms, duration, presence of n/v, fever, chills, etc  I need more information than 'she has diverticulitis'

## 2022-06-24 ENCOUNTER — TELEPHONE (OUTPATIENT)
Dept: FAMILY MEDICINE CLINIC | Age: 62
End: 2022-06-24

## 2022-06-24 NOTE — TELEPHONE ENCOUNTER
----- Message from Leigh Patton sent at 6/24/2022  4:43 PM EDT -----  Subject: Appointment Request    Reason for Call: Routine (Patient Request) No Script    QUESTIONS  Type of Appointment? Established Patient  Reason for appointment request? No appointments available during search  Additional Information for Provider? Pt need a call back regarding weight   loss and diverticulitis. Non emergency.   ---------------------------------------------------------------------------  --------------  [facility] INFO  What is the best way for the office to contact you? OK to leave message on   voicemail  Preferred Call Back Phone Number? [phone]  ---------------------------------------------------------------------------  --------------  SCRIPT ANSWERS  Relationship to Patient? Self  (Is the patient requesting to see the provider for a procedure?)? No  (Is the patient requesting to see the provider urgently  today or   tomorrow. )? No  Have you been diagnosed with COVID-19 in the past 10 days? No  (Service Expert  click yes below to proceed with [employer]ual   Scheduling)?  Yes

## 2022-06-24 NOTE — TELEPHONE ENCOUNTER
Looking through the patient's appointment section. This patient is scheduled on 07/11/2022. Is this okay, or would you like to see the patient sooner?

## 2022-07-11 ENCOUNTER — OFFICE VISIT (OUTPATIENT)
Dept: FAMILY MEDICINE CLINIC | Age: 62
End: 2022-07-11
Payer: COMMERCIAL

## 2022-07-11 VITALS
SYSTOLIC BLOOD PRESSURE: 112 MMHG | RESPIRATION RATE: 14 BRPM | BODY MASS INDEX: 19.86 KG/M2 | HEART RATE: 72 BPM | OXYGEN SATURATION: 99 % | TEMPERATURE: 97.1 F | WEIGHT: 130.6 LBS | DIASTOLIC BLOOD PRESSURE: 64 MMHG

## 2022-07-11 DIAGNOSIS — K57.32 DIVERTICULITIS OF COLON: ICD-10-CM

## 2022-07-11 DIAGNOSIS — E83.110 HEREDITARY HEMOCHROMATOSIS (HCC): ICD-10-CM

## 2022-07-11 DIAGNOSIS — R63.4 WEIGHT LOSS: ICD-10-CM

## 2022-07-11 DIAGNOSIS — F43.9 STRESS: ICD-10-CM

## 2022-07-11 DIAGNOSIS — F41.9 ANXIETY: ICD-10-CM

## 2022-07-11 DIAGNOSIS — F51.01 PRIMARY INSOMNIA: ICD-10-CM

## 2022-07-11 DIAGNOSIS — I10 ESSENTIAL HYPERTENSION, BENIGN: ICD-10-CM

## 2022-07-11 DIAGNOSIS — K57.92 ACUTE DIVERTICULITIS: Primary | ICD-10-CM

## 2022-07-11 PROCEDURE — 3017F COLORECTAL CA SCREEN DOC REV: CPT | Performed by: FAMILY MEDICINE

## 2022-07-11 PROCEDURE — 1036F TOBACCO NON-USER: CPT | Performed by: FAMILY MEDICINE

## 2022-07-11 PROCEDURE — G8427 DOCREV CUR MEDS BY ELIG CLIN: HCPCS | Performed by: FAMILY MEDICINE

## 2022-07-11 PROCEDURE — 99214 OFFICE O/P EST MOD 30 MIN: CPT | Performed by: FAMILY MEDICINE

## 2022-07-11 PROCEDURE — G8420 CALC BMI NORM PARAMETERS: HCPCS | Performed by: FAMILY MEDICINE

## 2022-07-11 RX ORDER — ALPRAZOLAM 0.5 MG/1
0.5 TABLET ORAL 3 TIMES DAILY PRN
Qty: 90 TABLET | Refills: 0 | Status: SHIPPED | OUTPATIENT
Start: 2022-07-11 | End: 2022-07-15 | Stop reason: SDUPTHER

## 2022-07-11 NOTE — Clinical Note
Nice lady with recurrent episodes of diverticulitis, including microperforations in the past.  2-3 more episodes since last imaging 2/2022. Saw robles who she felt was pushing too hard toward surgery. She wants to know what non-surgical options are available. .... I told her dietary fiber, pushing fluids and will have her see you. ... [facility]   BP

## 2022-07-11 NOTE — PROGRESS NOTES
Here for f/u and recheck of recurrent issues of diverticulitis. Pt has had several bouts of acute diverticulitis, and two episodes in the past few months in 5/2022 and 6/2022. Pt was seen by dr. Leatrice Jeans colorectal surgery in 4/2022 and they did discuss more aggressive treatment including doing sigmoid colon resection. Pt opted for more conservative therapy at that time, although since then she has had 2 additional episodes of diverticulitis. Last imaging was done 2/2022 when she had flare of sx. No imaging since then. Pt does not remember any trigger for her symptoms, nothing in terms of diet. Pt states that she has been dealing with some increased stressors and wonders if related. At this time, is feeling back to baseline. Pt does have some mild persistent pain in LLQ. Sx are more on L > R. Pt wants to avoid surgery if possible. pts last colonoscopy was 10/2020. Pt here for follow up of blood pressure. Pt states doing great with adherence to therapy and feels well. No issues of chest pain, shortness of breath. No vision changes, headache, swelling in legs. Pt has noted some weight loss, and wants to discuss. Pt has lost in excess of 20# but not on purpose. Pt felt was diet and stress and wonders if that is related. Pt has continued to deal with sleep issues, struggling with falling asleep. Has used xanax but wants to avoid consistent dosing. Pt does have rx for trazodone and wants to try that as a next step      Except as noted above in the history of present illness, the review of systems is  negative for headache, vision changes, chest pain, shortness of breath, abdominal pain, urinary sx, bowel changes.     Past medical, surgical, and social history reviewed and updated  Medications and allergies reviewed and updated        O: /64   Pulse 72   Temp 97.1 °F (36.2 °C) (Temporal)   Resp 14   Wt 130 lb 9.6 oz (59.2 kg)   SpO2 99%   BMI 19.86 kg/m²   GEN: No acute distress, cooperative, well nourished, alert. HEENT: PEERLA, EOMI , normocephalic/atraumatic, nares and oropharynx clear. Mucous membranes normal, Tympanic membranes clear bilaterally. Neck: soft, supple, no thyromegaly, mass, no Lymphadenopathy  CV: Regular rate and rhythm, no murmur, rubs, gallops. No edema. Resp: Clear to auscultation bilaterally good air entry bilaterally  No crackles, wheeze. Breathing comfortably. Psych: mood stable, No suicidal thoughts or ideation   abdL soft, nontender. normoactive bowels sounds. No hepatosplenomegaly  No costovertebral angle tenderness        ASSESSMENT / PLAN:    1. Acute diverticulitis  Resolved, exam nonfocal  Does have hx of recurrent diverticulitis, with episode of microperforation  Does need to consider surgery  Did see dr. Carmelo Andrade but felt she was pushing surgery too much  Refer dr. Allyn Linda MD, Colorectal Surgery, [facility]    2. Diverticulitis of colon  As above  - Kaleigh Alcala MD, Colorectal Surgery, [facility]    3. Hereditary hemochromatosis ([facility] [address]  F/u with heme/onc    4. Essential hypertension, benign  Stable @ goal, controlled  Off meds    5. Weight loss  Exam nonfocal  Normal CT abd/pelvis 2/2022  Check bloodwork as below  If persistent / progressive sx, will need CT chest  - CBC with Auto Differential; Future  - Iron and TIBC; Future  - Ferritin; Future  - Comprehensive Metabolic Panel; Future  - TSH; Future  - T4, Free; Future  - Vitamin B12 & Folate; Future  - Hemoglobin A1C; Future    6. Primary insomnia  Stable with supportive therapy  Cont prn xanax  Does have rx for trazodone, will start that at 50mg qhs  Discussed use, benefit, and side effects of prescribed medications. Barriers to medication compliance addressed. All patient questions answered. Pt voiced understanding.    refills given xanax as below  See [facility]  Pt aware of need for every 3 month medication followup appointments, and that medication refills for benzodiazepines, narcotics and/or stimulants will only be given at appointment. - ALPRAZolam (XANAX) 0.5 MG tablet; Take 1 tablet by mouth 3 times daily as needed for Sleep or Anxiety for up to 30 days. Dispense: 90 tablet; Refill: 0    7. Anxiety  As above  Monitor with start trazodone  - ALPRAZolam (XANAX) 0.5 MG tablet; Take 1 tablet by mouth 3 times daily as needed for Sleep or Anxiety for up to 30 days. Dispense: 90 tablet; Refill: 0    8. Stress  Cont supportive therapy  Encouraged her to set up f/u with dr. Heaton March           Follow-up appointment:   Pending eval by dr. Kim Castillo    Discussed use, benefit, and side effects of all prescribed medications. Barriers to medication compliance addressed. All patient questions answered. Pt voiced understanding. When applicable, patient's outside records were [facility]. The patient has signed appropriate paperworks/consents.

## 2022-07-14 DIAGNOSIS — F41.9 ANXIETY: ICD-10-CM

## 2022-07-14 DIAGNOSIS — F51.01 PRIMARY INSOMNIA: ICD-10-CM

## 2022-07-14 NOTE — TELEPHONE ENCOUNTER
----- Message from Colin Tello sent at 7/14/2022  3:05 PM EDT -----  Subject: Refill Request    QUESTIONS  Name of Medication? ALPRAZolam (XANAX) 0.5 MG tablet  Patient-reported dosage and instructions? Take 1 tablet by mouth 3 times   daily as needed for Sleep or Anxiety for up to 30 days. How many days do you have left? 2  Preferred Pharmacy? [id number]  Pharmacy phone number (if available)? [phone]  ---------------------------------------------------------------------------  --------------  Yamila TERAN  What is the best way for the office to contact you? OK to leave message on   voicemail  Preferred Call Back Phone Number? [phone]  ---------------------------------------------------------------------------  --------------  SCRIPT ANSWERS  Relationship to Patient?  Self

## 2022-07-15 RX ORDER — ALPRAZOLAM 0.5 MG/1
0.5 TABLET ORAL 3 TIMES DAILY PRN
Qty: 90 TABLET | Refills: 0 | Status: SHIPPED | OUTPATIENT
Start: 2022-07-15 | End: 2022-08-14

## 2022-08-16 ENCOUNTER — OFFICE VISIT (OUTPATIENT)
Dept: SURGERY | Age: 62
End: 2022-08-16
Payer: COMMERCIAL

## 2022-08-16 VITALS
TEMPERATURE: 98 F | HEIGHT: 68 IN | SYSTOLIC BLOOD PRESSURE: 129 MMHG | HEART RATE: 58 BPM | WEIGHT: 131 LBS | BODY MASS INDEX: 19.85 KG/M2 | DIASTOLIC BLOOD PRESSURE: 74 MMHG

## 2022-08-16 DIAGNOSIS — E83.110 HEREDITARY HEMOCHROMATOSIS (HCC): ICD-10-CM

## 2022-08-16 DIAGNOSIS — K57.32 DIVERTICULITIS OF COLON: Primary | ICD-10-CM

## 2022-08-16 PROCEDURE — G8427 DOCREV CUR MEDS BY ELIG CLIN: HCPCS | Performed by: SURGERY

## 2022-08-16 PROCEDURE — G8420 CALC BMI NORM PARAMETERS: HCPCS | Performed by: SURGERY

## 2022-08-16 PROCEDURE — 99204 OFFICE O/P NEW MOD 45 MIN: CPT | Performed by: SURGERY

## 2022-08-16 PROCEDURE — 3017F COLORECTAL CA SCREEN DOC REV: CPT | Performed by: SURGERY

## 2022-08-16 PROCEDURE — 1036F TOBACCO NON-USER: CPT | Performed by: SURGERY

## 2022-08-16 NOTE — Clinical Note
She still seems fairly unsure about proceeding with surgery. No big deal.  I am here when she is ready  Thanks!  [employer]

## 2022-08-16 NOTE — PROGRESS NOTES
[facility] COLORECTAL SURGERY  [address]  Dept: [phone]  Dept Fax: [phone]  Loc: [phone]    Visit Date: 8/16/2022    Lenard Posada is a 65 y.o. female who presents today for: New Patient (diverticulitis)      HPI:       Lenard Posada is a 65 y.o. female who I had actually seen in the hospital in July 2021 during her admission for acute diverticulitis. She states that since then she has had almost a whole year of discomfort in the left lower quadrant. It is only recently been improving over the past month or so. She has also seen another surgeon at [facility] for opinion regarding surgical resection. She seems to be interested in nonsurgical options, which I think is reasonable. She does have a history of hemochromatosis and follows with Dr. Melina Whitmore of hematology. Other surgeries include laparoscopic ectopic pregnancy, knee arthroplasty.     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     Hemochromatosis     Hives     Primary osteoarthritis of both knees 11/25/2020     Past Surgical History:   Procedure Laterality Date    ECTOPIC PREGNANCY SURGERY      LAPAROSCOPY      early 25s    TOTAL KNEE ARTHROPLASTY Left 01/2021       Current Outpatient Medications:     B Complex Vitamins (B COMPLEX-B12 PO), Take by mouth, Disp: , Rfl:     Omega 3-6-9 Fatty Acids (OMEGA-3-6-9 PO), Take by mouth, Disp: , Rfl:     Calcium Carbonate-Vit D-Min (CALCIUM 1200 PO), Take by mouth, Disp: , Rfl:     Bacillus Coagulans-Inulin (PROBIOTIC) 1-250 BILLION-MG CAPS, Take 1 capsule by mouth daily, Disp: , Rfl:     MAGNESIUM GLYCINATE PLUS PO, Take 400 mg by mouth daily, Disp: , Rfl:     ALPRAZolam (XANAX) 0.5 MG tablet, TK 1 T PO  TID PRN FOR SLEEP OR ANXIETY, Disp: , Rfl:     Psyllium (METAMUCIL FIBER PO), Take by mouth , Disp: , Rfl:     Multiple Vitamins-Minerals (THERAPEUTIC MULTIVITAMIN-MINERALS) tablet, Take 1 tablet by mouth daily, Disp: , Rfl:     Vitamin D (CHOLECALCIFEROL) 1000 UNITS CAPS capsule, Take 1,000 Units by mouth daily. , Disp: , Rfl:   Allergies   Allergen Reactions    Dust Mite Extract     Macrobid [Nitrofurantoin Macrocrystal] Rash     Past Surgical History:   Procedure Laterality Date    ECTOPIC PREGNANCY SURGERY      LAPAROSCOPY      early 25s    TOTAL KNEE ARTHROPLASTY Left 2021     Family History   Problem Relation Age of Onset    Hemochromatosis Brother     Hemochromatosis Sister     Hemochromatosis Mother     Thyroid Disease Mother     Hemochromatosis Father     Hypertension Father     Hypertension Brother     Hypertension Sister     Cancer Maternal Grandmother         bone       Social History:   Social History     Tobacco Use    Smoking status: Former     Packs/day: 0.50     Years: 5.00     Pack years: 2.50     Types: Cigarettes     Quit date: 1998     Years since quittin.9    Smokeless tobacco: Never   Substance Use Topics    Alcohol use: Yes     Comment: socially, 5x per week      Tobacco cessation counseling provided as appropriate. REVIEW OF SYSTEMS:    Pertinent positives and negatives are mentioned in the HPI. Otherwise, all other systems were reviewed and negative. Objective:     Physical Exam   /74   Pulse 58   Temp 98 °F (36.7 °C) (Infrared)   Ht 5' 8\" (1.727 m)   Wt 131 lb (59.4 kg)   BMI 19.92 kg/m²   Constitutional: Appears well-developed and well-nourished. Grooming appropriate. No gross deformities. Body mass index is 19.92 kg/m². Eyes: No scleral icterus. Conjunctiva/lids normal. Vision intact grossly. Pupils equal/symmetric, reactive bilaterally. ENT: External ears/nose without defect, scars, or masses. Hearing grossly intact. No facial deformity. Lips normal, normal dentition. Neck: No masses. Trachea midline. No crepitus. Thyroid not enlarged.   Cardiovascular: Normal rate. No peripheral edema. Abdominal aorta normal size to palpation. Pulmonary/Chest: Effort normal. No respiratory distress. No wheezes. No use of accessory muscles. Musculoskeletal: Normal range of motion of head/neck, without deformity, pain, or crepitus, with normal strength and tone. Normal gait. Nails without clubbing or cyanosis. Neurological: Alert and oriented to person, place, and time. No gross deficits. Sensation intact. Skin: Skin is dry. No rashes noted. No pallor. No induration of nodules. Psychiatric: Normal mood and affect. Behavior normal. Oriented to person, place, and time. Judgment and insight reasonable. Abdominal/wound: Soft, nontender, nondistended      Labs reviewed: None     Imaging reviewed: CT scans reviewed from July 2021, as well as Feb 2022 -more recent CT scan showing thickening and sigmoid up to colitis, and previous CT scan showing intraluminal foci of gas    Outside hospital records reviewed. Assessment/Plan:       A/P:  New problem(s) with uncertain prognosis: Smoldering versus recurrent acute diverticulitis of sigmoid colon  Established problem(s): Hemochromatosis  Additional workup/treatment planned: Continue with expectant management versus surgical intervention  Risk of complications/morbidity: High    I had a very long discussion with Ms. Corea Brian today in the office. We discussed the pathophysiology, etiology, work-up, treatment options, medical and surgical treatment for diverticulosis and diverticulitis. We discussed risk factors. We discussed dietary changes. We also spent the bulk of the time discussing surgical intervention. Discussed robotic and laparoscopic techniques. We discussed risks, benefits, expectations, time off, complications of sigmoid colectomy.   Overall I do think she would be a good candidate for sigmoid colectomy as she seems to be having either smoldering or recurrent acute diverticulitis symptoms, and at least 4-5 discrete attacks. However, she seems to still be thinking about whether she would want to have surgery or not. I think this is reasonable, and we discussed nonsurgical treatment options such as antibiotics as needed, and high-fiber diet. She asked about natural pathic treatment options, of which I do not know of any. Continue with current prescription medications    I provided written information in the After Visit Summary AVS regarding: diverticulosis/itis    DISPOSITION: Call if surgery desired; recommend finding hematologist to help her with her hemochromatosis as Dr. Aubrie Lomeli has moved out of town    My findings will be relayed to consulting practitioner or PCP via Epic note    Note completed using dictation software, please excuse any errors.     Electronically signed by Claudeen Schlichter, MD on 8/16/2022 at 3:21 PM

## 2022-08-16 NOTE — PATIENT INSTRUCTIONS
Learning About Diverticulosis and Diverticulitis    What are diverticulosis and diverticulitis? In diverticulosis and diverticulitis, pouches called diverticula form in the wall of the large intestine, or colon. In diverticulosis, the pouches do not cause any pain or other symptoms. In diverticulitis, the pouches get inflamed or infected and cause symptoms. Doctors aren't sure what causes these pouches in the colon. But they think that a low-fiber diet may play a role. Without fiber to add bulk to the stool, the colon has to work harder than normal to push the stool forward. The pressure from this may cause pouches to form in weak spots along the colon. Some people with diverticulosis get diverticulitis. But experts don't know why this happens. What are the symptoms? In diverticulosis, most people don't have symptoms. But pouches sometimes bleed. In diverticulitis, symptoms may last from a few hours to a week or more. They include:  Belly pain. This is usually in the lower left side. It is sometimes worse when you move. This is the most common symptom. Fever and chills. Bloating and gas. Diarrhea or constipation. Nausea and sometimes vomiting. Not feeling like eating. How can you prevent these problems? You may be able to lower your chance of getting diverticulitis. You can do this by taking steps to prevent constipation. Eat fruits, vegetables, beans, and whole grains every day. These foods are high in fiber. Drink plenty of fluids (enough so that your urine is light yellow or clear like water). If you have kidney, heart, or liver disease and have to limit fluids, talk with your doctor before you increase the amount of fluids you drink. Get at least 30 minutes of exercise on most days of the week. Walking is a good choice. You also may want to do other activities, such as running, swimming, cycling, or playing tennis or team sports.   Take a fiber supplement, such as Citrucel or Metamucil, every day if needed. Read and follow all instructions on the label. Schedule time each day for a bowel movement. Having a daily routine may help. Take your time and do not strain when having a bowel movement. There is no need to avoid nuts, seeds, berries, and popcorn. How are these problems treated? The best way to treat diverticulosis is to avoid constipation. (See the tips above.)  Treatment of diverticulitis depends on the severity of symptoms, which can vary widely. Mild diverticulitis is usually treated with an oral antibiotics and clear liquid diet for a day or 2. As symptoms improve, patients can slowly advance to a regular diet. In moderate cases of diverticulitis, some patient require admission to the hospital for IV antibiotics and close monitoring. Some patients may require a drainage catheter if there is an internal abscess that needs draining. In rare cases of severe acute diverticulitis, some patients may require emergency surgery. In most cases, this will be a person's first attack of diverticulitis. Some patients may develop recurrent attacks of diverticulitis. Usually these attacks are of a similar severity level as previous attacks. Those who develop complications (see below) or have multiple attacks or increasing frequency of attacks may need to undergo surgical removal of that portion of the colon. A potential complication of repeated attacks of diverticulitis is something called a \"fistula\". A fistula is an abnormal connection between two organs that are not usually connected. Most commonly, a fistula may form between the inflamed colon and the bladder. This can result in symptoms of repeated urinary infections, lower abdominal pain, and passing air or even stool in your urine. This complications is treated with surgery to remove that portion of the colon and repair the bladder.  Less commonly, fistulas can form between the colon and the vagina, or the colon and the skin. Other complications include stricture or obstruction due to scarring and blockage. Next steps after being treated for diverticulitis:  It is very important that you obtain a colonoscopy after being diagnosed with diverticulitis. This is usually done 6-10 weeks after you recover, and before any planned surgery. In rare circumstances, cancer may be hidden by the diverticulitis, and only a colonoscopy will be able to differentiate the two. Surgery for diverticulitis:    Surgery can be done for patients with repeated attacks of diverticulitis, those with continued diverticulitis symptoms not responsive to antibiotics, and those who develop complications such as fistula or stricture. Those who have surgery for diverticulitis have a good success rate and are at very low risk of problems with diverticulitis in the future. Surgery can be done robotically (key-hole incisions), laparoscopically (also key-hole incisions), and open, depending on intra-operative findings and other technical factors. Typically only a segment of colon is resected (taken out) and usually the colon can be reconnected. Sometimes an ostomy (bag) needed. Ostomies may be temporary or permanent. Most patients spend anywhere from 3-7 days in the hospital, depending on various factors, such as age, health, and how the surgery is done. Surgery has risks, which can include (but are not limited to): bleeding, wound infections, hernia (especially with open surgery), damage to other structures, anastomotic leak (this happens if the colon connection does not heal properly), risks of anesthesia. These risks are increased in smokers, poorly controlled diabetics, those who take steroids, blood thinners, and those who are obese. Recovery after leaving the hospital can vary from person to person. Most people take about 2 weeks off from work after this operation.  You will be restricted on heavy lifting for about 4-6 weeks after the operation to help lower the risk of abnormal healing, which can lead to hernias. If you have any questions before your surgery, please call Dr Alecia Marinelli office at [phone].

## 2022-08-25 ENCOUNTER — OFFICE VISIT (OUTPATIENT)
Dept: PSYCHOLOGY | Age: 62
End: 2022-08-25
Payer: COMMERCIAL

## 2022-08-25 DIAGNOSIS — F33.1 MAJOR DEPRESSIVE DISORDER, RECURRENT EPISODE, MODERATE WITH ANXIOUS DISTRESS (HCC): Primary | ICD-10-CM

## 2022-08-25 PROCEDURE — 90832 PSYTX W PT 30 MINUTES: CPT | Performed by: PSYCHOLOGIST
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
## 2022-09-29 ENCOUNTER — OFFICE VISIT (OUTPATIENT)
Dept: PSYCHOLOGY | Age: 62
End: 2022-09-29
Payer: COMMERCIAL

## 2022-09-29 DIAGNOSIS — F33.1 MAJOR DEPRESSIVE DISORDER, RECURRENT EPISODE, MODERATE WITH ANXIOUS DISTRESS (HCC): Primary | ICD-10-CM

## 2022-09-29 PROCEDURE — 90832 PSYTX W PT 30 MINUTES: CPT | Performed by: PSYCHOLOGIST

## 2022-09-29 NOTE — PROGRESS NOTES
Behavioral Health Consultation  Gina Leon Psy.D. Psychologist  9/29/2022  11:40 AM - 12 PM EDT      Time spent with Patient: 20 minutes (pt arrived 10 minutes late)  This is patient's second [facility] appointment since her last episode of care ended in 2017. Reason for Consult: Stress mgmt  Referring Provider: MD Alan Thomsael [address] 21 [facility] (During DQMNP-80 public health emergency)    Behzad Gabriel was evaluated through a synchronous (real-time) audio-video encounter using HIPAA-compliant technology. The patient (or guardian, if applicable) is aware that this is a billable service, which includes applicable co-pays. This Virtual Visit was conducted with patient's (and/or legal guardian's) consent. The visit was conducted pursuant to the emergency declaration under the [address] [place] [address] authority and the [employer] General Act. Patient identification was verified, and a caregiver was present when appropriate. The patient was located in a state where the provider was licensed to provide care. Conducted a risk-benefit analysis and determined that the patient's presenting problems are consistent with the use of telepsychology. Determined that the patient has sufficient knowledge and skills in the use of technology enabling them to adequately benefit from telepsychology. It was determined that this patient was able to be properly treated without an in-person session. Patient verified current location at the beginning of the visit.     Verified the following information:  Patient's identification: Yes  Patient location: [facility] [address]  Patient's call back number: [phone]  Patient's emergency contact's name and number, as well as permission to contact them if needed:  Extended Emergency Contact Information  Primary Emergency Contact: [facility]  Address: [address] [facility], [address] [place] [address] Phone: [phone]  Mobile Phone: [phone]  Relation: Spouse    Provider location: [address], [address]:  Pt is dealing with stress from dealing with her father's VA benefits, family conflict, and with the neighborhood association issues. She would like to set appropriate boundaries but she also wants to continue supporting loved ones who need her help. Pt is struggling with increased GI discomfort d/t added stress. She continues to abstain from alcohol use. She wakes during the night and has difficult returning to sleep d/t racing thoughts. O:  Interventions:  -Supportive techniques  -Processed experiences / stressors / concerns  -Reinforced efforts towards self-care and boundary setting  -Recommended journaling and meditation before bed  -Recommended reading The Dance of Anger by Kalen Beaulieu      A:  MSE:  Appearance: good hygiene  and appropriate attire  Attitude: cooperative and friendly  Consciousness: alert  Orientation: oriented to person, place, time, general circumstance  Memory: recent and remote memory intact  Attention/Concentration: intact during session  Psychomotor Activity: normal  Eye Contact: normal  Speech: normal rate and volume, well-articulated  Mood: anxious, dysphoric  Affect: congruent  Perception: within normal limits  Thought Content: within normal limits  Thought Process: logical, coherent and goal-directed  Insight: good  Judgment: intact  Ability to understand instructions: Yes  Ability to respond meaningfully: Yes  Morbid Ideation: no   Suicide Assessment: no suicidal ideation, plan, or intent. Appropriate for outpatient / telehealth care at this time.   Homicidal Ideation: no      PHQ Scores 7/15/2021 5/12/2020 7/9/2018 1/12/2017 12/15/2016   PHQ2 Score 0 0 0 2 3   PHQ9 Score 0 0 0 11 12     Interpretation of Total Score Depression Severity: 1-4 = Minimal depression, 5-9 = Mild depression, 10-14 = Moderate depression, 15-19 = Moderately severe depression, 20-27 = Severe depression    Diagnosis:    1. Major depressive disorder, recurrent episode, moderate with anxious distress [facility])          Patient Active Problem List   Diagnosis    Rosacea    Eczema    Essential hypertension, benign    Raynaud disease    Diverticulosis    Acute cystitis without hematuria    Primary osteoarthritis of both knees    Hemochromatosis    Diverticulitis of colon         Plan:  Pt interventions:  Supportive techniques, Emphasized self-care as important for managing overall health, Cognitive strategies to target see balanced thinking, and Provided pt book recommendation. Pt Behavioral Change Plan:  Pt set the following goals:  Consider reading When the Body Says No by Loc Lozoya  Let your family know that you will respect their boundaries. You will be there to offer information if they want to ask. Consider reading The Dance of Anger by Joaquina Showers    Pt scheduled a F/U virtual visit.

## 2022-09-29 NOTE — PATIENT INSTRUCTIONS
Goals:  Consider reading When the Body Says No by Loc Mate  Let your family know that you will respect their boundaries. You will be there to offer information if they want to ask.    Consider reading The Dance of Anger by Nalini souza

## 2022-11-08 ENCOUNTER — TELEPHONE (OUTPATIENT)
Dept: FAMILY MEDICINE CLINIC | Age: 62
End: 2022-11-08

## 2022-11-08 ENCOUNTER — OFFICE VISIT (OUTPATIENT)
Dept: PSYCHOLOGY | Age: 62
End: 2022-11-08
Payer: COMMERCIAL

## 2022-11-08 DIAGNOSIS — F33.1 MAJOR DEPRESSIVE DISORDER, RECURRENT EPISODE, MODERATE WITH ANXIOUS DISTRESS (HCC): Primary | ICD-10-CM

## 2022-11-08 PROCEDURE — 90832 PSYTX W PT 30 MINUTES: CPT | Performed by: PSYCHOLOGIST

## 2022-11-08 PROCEDURE — 1036F TOBACCO NON-USER: CPT | Performed by: PSYCHOLOGIST

## 2022-11-08 NOTE — PATIENT INSTRUCTIONS
Goals:  Consider reading When the Body Says No by Loc Lozoya  Let your family know that you will respect their boundaries. You will be there to offer information if they want to ask. Consider reading The Dance of Anger by Branden Mandel  Consider listening to The [address] podcast episode with Ellyn Mayfielding about Internal Family Systems (IFS) therapy  When you notice a part of yourself (e.g., perfectionism) taking over, internally acknowledge and validate the part (e.g., \"I recognize that you're there. I feel you trying to help me, to protect me in the only way you know how. Thank you for being there for me. \"). The goal is to meet that part with openness, compassion, and validation. Once that part feels understood, then you can ask it to soften or give you some space.

## 2022-11-08 NOTE — PROGRESS NOTES
Behavioral Health Consultation  Mao Castellanos Psy.D. Psychologist  11/8/2022  2-2:30 PM EDT      Time spent with Patient: 30 minutes   This is patient's third [facility] appointment since her last episode of care ended in 2017. Reason for Consult: Stress mgmt  Referring Provider: Bobo Espinoza MD  [address] [facility] [address] 21 [facility] (During CDSJX-25 public health emergency)    David Quesada was evaluated through a synchronous (real-time) audio-video encounter using HIPAA-compliant technology. The patient (or guardian, if applicable) is aware that this is a billable service, which includes applicable co-pays. This Virtual Visit was conducted with patient's (and/or legal guardian's) consent. The visit was conducted pursuant to the emergency declaration under the [address] [place] [address] authority and the [employer] and [employer] General Act. Patient identification was verified, and a caregiver was present when appropriate. The patient was located in a state where the provider was licensed to provide care. Conducted a risk-benefit analysis and determined that the patient's presenting problems are consistent with the use of telepsychology. Determined that the patient has sufficient knowledge and skills in the use of technology enabling them to adequately benefit from telepsychology. It was determined that this patient was able to be properly treated without an in-person session. Patient verified current location at the beginning of the visit.     Verified the following information:  Patient's identification: Yes  Patient location: [facility] [address]  Patient's call back number: [phone]  Patient's emergency contact's name and number, as well as permission to contact them if needed:  Extended Emergency Contact Information  Primary Emergency Contact: [facility]  Address: [address] [place] [address] [address] [place] [address] Phone: [phone]  Mobile Phone: [phone]  Relation: Spouse    Provider location: [place]:  Pt continues to work on balancing caring for others with self-care. She discussed ongoing concerns about family members, particularly her sister. She has been reading When the Body Says No and she expressed concern about the impact of her perfectionism, which she can trace back to her childhood, on her health and wellbeing. O:  Interventions:  -Supportive techniques  -Processed experiences / stressors / concerns  -Reinforced efforts towards self-care   -Explored family dynamics and health  -Introduced pt to the concept of parts (IFS)      A:  MSE:  Appearance: good hygiene  and appropriate attire  Attitude: cooperative and friendly  Consciousness: alert  Orientation: oriented to person, place, time, general circumstance  Memory: recent and remote memory intact  Attention/Concentration: intact during session  Psychomotor Activity: normal  Eye Contact: normal  Speech: normal rate and volume, well-articulated  Mood: anxious, dysphoric  Affect: congruent  Perception: within normal limits  Thought Content: within normal limits  Thought Process: logical, coherent and goal-directed  Insight: good  Judgment: intact  Ability to understand instructions: Yes  Ability to respond meaningfully: Yes  Morbid Ideation: no   Suicide Assessment: no suicidal ideation, plan, or intent. Appropriate for outpatient / telehealth care at this time. Homicidal Ideation: no      PHQ Scores 7/15/2021 5/12/2020 7/9/2018 1/12/2017 12/15/2016   PHQ2 Score 0 0 0 2 3   PHQ9 Score 0 0 0 11 12     Interpretation of Total Score Depression Severity: 1-4 = Minimal depression, 5-9 = Mild depression, 10-14 = Moderate depression, 15-19 = Moderately severe depression, 20-27 = Severe depression    Diagnosis:    1.  Major depressive disorder, recurrent episode, moderate with anxious distress [facility])          Patient Active Problem List   Diagnosis    Rosacea    Eczema    Essential hypertension, benign    Raynaud disease    Diverticulosis    Acute cystitis without hematuria    Primary osteoarthritis of both knees    Hemochromatosis    Diverticulitis of colon         Plan:  Pt interventions:  Supportive techniques, Provided Psychoeducation re: see above, Emphasized self-care as important for managing overall health, and Cognitive strategies to target see balanced thinking . Pt Behavioral Change Plan:  Pt set the following goals:  Consider reading When the Body Says No by Loc Lozoya  Let your family know that you will respect their boundaries. You will be there to offer information if they want to ask. Consider reading The Dance of Anger by Jair Garza  Consider listening to The [address] podcast episode with Batool Rene about Internal Family Systems (IFS) therapy  When you notice a part of yourself (e.g., perfectionism) taking over, internally acknowledge and validate the part (e.g., \"I recognize that you're there. I feel you trying to help me, to protect me in the only way you know how. Thank you for being there for me. \"). The goal is to meet that part with openness, compassion, and validation. Once that part feels understood, then you can ask it to soften or give you some space. Pt scheduled a F/U virtual visit.

## 2022-11-08 NOTE — TELEPHONE ENCOUNTER
Patient needs refill of the following:    ALPRAZolam (XANAX) 0.5 MG tablet [[id number]]     Order Details  Dose, Route, Frequency: As Directed   Dispense Quantity: -- Refills: --          Sig: TK 1 T PO  TID PRN FOR SLEEP OR ANXIETY         Start Date: 11/14/19 End Date: --   Written Date: -- Expiration Date: --   Ordering Date: 12/20/19     Source:  Received from: External Pharmacy   Providers    Authorizing Provider: Eli Lazo MD NPI: --   Documenting User:  Bk Casanova MA            Please send to [address] [place] [address] [phone]   [address] [place] [address]   Phone:  [phone]  Fax:  [phone]    Last ov: 7/11/2022  Last labs: 12/03/2021    She has an appointment with Dr. Janette Jeffers today. Wasn't sure if Dr. Nito Fajardo or Janette Jeffers would fill this. Please call patient to advise.      Thank you

## 2022-11-08 NOTE — TELEPHONE ENCOUNTER
Needs appt for controlled substances, has not been seen in over 3 months  This is state guidelines/rules

## 2022-11-11 RX ORDER — ALPRAZOLAM 0.5 MG/1
TABLET ORAL
Qty: 90 TABLET | OUTPATIENT
Start: 2022-11-11

## 2022-11-11 NOTE — TELEPHONE ENCOUNTER
Requested Prescriptions     Pending Prescriptions Disp Refills    ALPRAZolam (XANAX) 0.5 MG tablet [Pharmacy Med Name: ALPRAZOLAM 0.5MG TABLETS] 90 tablet      Sig: TAKE 1 TABLET BY MOUTH THREE TIMES DAILY AS NEEDED FOR SLEEP OR ANXIETY     Pt was advised that she will need to be seen every 3 months in order to get this medication refilled. Pt understood. Pt was advised that Dr Calixto Santos appt can not count to get her rx because she is not the prescribing provider. She would like for us to call her insurance and ask if she is able to do her physical earlier that 12/8. I will call here in a bit to her insurance. Her insurance stated that her physicals have to be 366 days apart.     Pt advised

## 2022-11-21 ENCOUNTER — TELEPHONE (OUTPATIENT)
Dept: FAMILY MEDICINE CLINIC | Age: 62
End: 2022-11-21

## 2022-11-21 DIAGNOSIS — Z12.4 CERVICAL CANCER SCREENING: Primary | ICD-10-CM

## 2022-12-07 ENCOUNTER — TELEPHONE (OUTPATIENT)
Dept: FAMILY MEDICINE CLINIC | Age: 62
End: 2022-12-07

## 2022-12-07 DIAGNOSIS — Z00.00 ROUTINE GENERAL MEDICAL EXAMINATION AT A HEALTH CARE FACILITY: Primary | ICD-10-CM

## 2022-12-07 DIAGNOSIS — R63.4 WEIGHT LOSS: ICD-10-CM

## 2022-12-08 ENCOUNTER — OFFICE VISIT (OUTPATIENT)
Dept: FAMILY MEDICINE CLINIC | Age: 62
End: 2022-12-08

## 2022-12-08 VITALS
WEIGHT: 131.8 LBS | RESPIRATION RATE: 14 BRPM | SYSTOLIC BLOOD PRESSURE: 134 MMHG | HEART RATE: 71 BPM | HEIGHT: 68 IN | OXYGEN SATURATION: 96 % | TEMPERATURE: 98.3 F | BODY MASS INDEX: 19.97 KG/M2 | DIASTOLIC BLOOD PRESSURE: 66 MMHG

## 2022-12-08 DIAGNOSIS — Z00.00 ROUTINE GENERAL MEDICAL EXAMINATION AT A HEALTH CARE FACILITY: Primary | ICD-10-CM

## 2022-12-08 DIAGNOSIS — K57.32 DIVERTICULITIS OF COLON: ICD-10-CM

## 2022-12-08 DIAGNOSIS — Z12.31 ENCOUNTER FOR SCREENING MAMMOGRAM FOR MALIGNANT NEOPLASM OF BREAST: ICD-10-CM

## 2022-12-08 DIAGNOSIS — E83.110 HEREDITARY HEMOCHROMATOSIS (HCC): ICD-10-CM

## 2022-12-08 DIAGNOSIS — Z01.419 WELL WOMAN EXAM: ICD-10-CM

## 2022-12-08 DIAGNOSIS — F43.9 STRESS: ICD-10-CM

## 2022-12-08 DIAGNOSIS — F41.9 ANXIETY: ICD-10-CM

## 2022-12-08 DIAGNOSIS — I10 ESSENTIAL HYPERTENSION, BENIGN: ICD-10-CM

## 2022-12-08 DIAGNOSIS — K57.90 DIVERTICULOSIS: ICD-10-CM

## 2022-12-08 DIAGNOSIS — F51.01 PRIMARY INSOMNIA: ICD-10-CM

## 2022-12-08 DIAGNOSIS — Z23 NEED FOR SHINGLES VACCINE: ICD-10-CM

## 2022-12-08 PROBLEM — N30.00 ACUTE CYSTITIS WITHOUT HEMATURIA: Status: RESOLVED | Noted: 2020-01-03 | Resolved: 2022-12-08

## 2022-12-08 LAB
A/G RATIO: 1.7 (ref 1.1–2.2)
ALBUMIN SERPL-MCNC: 4.2 G/DL (ref 3.4–5)
ALP BLD-CCNC: 75 U/L (ref 40–129)
ALT SERPL-CCNC: 10 U/L (ref 10–40)
ANION GAP SERPL CALCULATED.3IONS-SCNC: 13 MMOL/L (ref 3–16)
AST SERPL-CCNC: 15 U/L (ref 15–37)
BASOPHILS ABSOLUTE: 0 K/UL (ref 0–0.2)
BASOPHILS RELATIVE PERCENT: 0.6 %
BILIRUB SERPL-MCNC: 0.7 MG/DL (ref 0–1)
BUN BLDV-MCNC: 10 MG/DL (ref 7–20)
CALCIUM SERPL-MCNC: 9.4 MG/DL (ref 8.3–10.6)
CHLORIDE BLD-SCNC: 101 MMOL/L (ref 99–110)
CHOLESTEROL, TOTAL: 190 MG/DL (ref 0–199)
CO2: 26 MMOL/L (ref 21–32)
CREAT SERPL-MCNC: 0.5 MG/DL (ref 0.6–1.2)
EOSINOPHILS ABSOLUTE: 0.2 K/UL (ref 0–0.6)
EOSINOPHILS RELATIVE PERCENT: 2.3 %
ESTIMATED AVERAGE GLUCOSE: 96.8 MG/DL
FERRITIN: 44.2 NG/ML (ref 15–150)
FOLATE: 18.04 NG/ML (ref 4.78–24.2)
GFR SERPL CREATININE-BSD FRML MDRD: >60 ML/MIN/{1.73_M2}
GLUCOSE BLD-MCNC: 97 MG/DL (ref 70–99)
HBA1C MFR BLD: 5 %
HCT VFR BLD CALC: 42.3 % (ref 36–48)
HDLC SERPL-MCNC: 88 MG/DL (ref 40–60)
HEMOGLOBIN: 14.2 G/DL (ref 12–16)
IRON SATURATION: 92 % (ref 15–50)
IRON: 259 UG/DL (ref 37–145)
LDL CHOLESTEROL CALCULATED: 91 MG/DL
LYMPHOCYTES ABSOLUTE: 2 K/UL (ref 1–5.1)
LYMPHOCYTES RELATIVE PERCENT: 30 %
MCH RBC QN AUTO: 34.2 PG (ref 26–34)
MCHC RBC AUTO-ENTMCNC: 33.4 G/DL (ref 31–36)
MCV RBC AUTO: 102.2 FL (ref 80–100)
MONOCYTES ABSOLUTE: 0.5 K/UL (ref 0–1.3)
MONOCYTES RELATIVE PERCENT: 7.7 %
NEUTROPHILS ABSOLUTE: 3.9 K/UL (ref 1.7–7.7)
NEUTROPHILS RELATIVE PERCENT: 59.4 %
PDW BLD-RTO: 12.6 % (ref 12.4–15.4)
PLATELET # BLD: 289 K/UL (ref 135–450)
PMV BLD AUTO: 9.2 FL (ref 5–10.5)
POTASSIUM SERPL-SCNC: 4.4 MMOL/L (ref 3.5–5.1)
RBC # BLD: 4.14 M/UL (ref 4–5.2)
SODIUM BLD-SCNC: 140 MMOL/L (ref 136–145)
T4 FREE: 1.2 NG/DL (ref 0.9–1.8)
TOTAL IRON BINDING CAPACITY: 283 UG/DL (ref 260–445)
TOTAL PROTEIN: 6.7 G/DL (ref 6.4–8.2)
TRIGL SERPL-MCNC: 56 MG/DL (ref 0–150)
TSH SERPL DL<=0.05 MIU/L-ACNC: 1.32 UIU/ML (ref 0.27–4.2)
VITAMIN B-12: 865 PG/ML (ref 211–911)
VITAMIN D 25-HYDROXY: 55.9 NG/ML
VLDLC SERPL CALC-MCNC: 11 MG/DL
WBC # BLD: 6.6 K/UL (ref 4–11)

## 2022-12-08 RX ORDER — AMOXICILLIN AND CLAVULANATE POTASSIUM 875; 125 MG/1; MG/1
1 TABLET, FILM COATED ORAL 2 TIMES DAILY
Qty: 20 TABLET | Refills: 0 | Status: SHIPPED | OUTPATIENT
Start: 2022-12-08 | End: 2022-12-18

## 2022-12-08 RX ORDER — ALPRAZOLAM 0.5 MG/1
0.5 TABLET ORAL 3 TIMES DAILY PRN
Qty: 90 TABLET | Refills: 0 | Status: SHIPPED | OUTPATIENT
Start: 2022-12-08 | End: 2023-01-07

## 2022-12-08 SDOH — ECONOMIC STABILITY: FOOD INSECURITY: WITHIN THE PAST 12 MONTHS, YOU WORRIED THAT YOUR FOOD WOULD RUN OUT BEFORE YOU GOT MONEY TO BUY MORE.: NEVER TRUE

## 2022-12-08 SDOH — ECONOMIC STABILITY: FOOD INSECURITY: WITHIN THE PAST 12 MONTHS, THE FOOD YOU BOUGHT JUST DIDN'T LAST AND YOU DIDN'T HAVE MONEY TO GET MORE.: NEVER TRUE

## 2022-12-08 ASSESSMENT — PATIENT HEALTH QUESTIONNAIRE - PHQ9
SUM OF ALL RESPONSES TO PHQ QUESTIONS 1-9: 3
SUM OF ALL RESPONSES TO PHQ QUESTIONS 1-9: 3
4. FEELING TIRED OR HAVING LITTLE ENERGY: 0
2. FEELING DOWN, DEPRESSED OR HOPELESS: 0
10. IF YOU CHECKED OFF ANY PROBLEMS, HOW DIFFICULT HAVE THESE PROBLEMS MADE IT FOR YOU TO DO YOUR WORK, TAKE CARE OF THINGS AT HOME, OR GET ALONG WITH OTHER PEOPLE: 1
3. TROUBLE FALLING OR STAYING ASLEEP: 2
SUM OF ALL RESPONSES TO PHQ QUESTIONS 1-9: 3
6. FEELING BAD ABOUT YOURSELF - OR THAT YOU ARE A FAILURE OR HAVE LET YOURSELF OR YOUR FAMILY DOWN: 1
8. MOVING OR SPEAKING SO SLOWLY THAT OTHER PEOPLE COULD HAVE NOTICED. OR THE OPPOSITE, BEING SO FIGETY OR RESTLESS THAT YOU HAVE BEEN MOVING AROUND A LOT MORE THAN USUAL: 0
7. TROUBLE CONCENTRATING ON THINGS, SUCH AS READING THE NEWSPAPER OR WATCHING TELEVISION: 0
5. POOR APPETITE OR OVEREATING: 0
SUM OF ALL RESPONSES TO PHQ9 QUESTIONS 1 & 2: 0
SUM OF ALL RESPONSES TO PHQ QUESTIONS 1-9: 3
9. THOUGHTS THAT YOU WOULD BE BETTER OFF DEAD, OR OF HURTING YOURSELF: 0
1. LITTLE INTEREST OR PLEASURE IN DOING THINGS: 0

## 2022-12-08 ASSESSMENT — SOCIAL DETERMINANTS OF HEALTH (SDOH): HOW HARD IS IT FOR YOU TO PAY FOR THE VERY BASICS LIKE FOOD, HOUSING, MEDICAL CARE, AND HEATING?: NOT HARD AT ALL

## 2022-12-08 NOTE — PROGRESS NOTES
findings  Head - Normocephalic. No masses, lesions, tenderness or abnormalities  Eyes - conjunctivae/corneas clear. Pupils equal and reactive to light and accomodation, extraocular muscles intact. Ears - External ears normal. Canals clear. Tympanic membranes normal bilaterally. Nose/Sinuses - Nares normal. Septum midline. Mucosa normal. No drainage or sinus tenderness. Oropharynx - Lips, mucosa, and tongue normal. Teeth and gums normal.   Neck - Neck supple. No adenopathy. Thyroid symmetric, normal size, no carotid bruit bilaterally  Back - Back symmetric, no curvature. Range of motion normal. No Costovertebral angle tenderness. Lungs - Percussion normal. Good diaphragmatic excursion. Lungs clear without wheeze, rales, crackles  Heart - Regular rate and rhythm, with no rub, murmur or gallop noted. Abdomen - Abdomen soft, non-tender. Bowel sounds normal. No masses, tenderness or organomegaly  Breasts: breasts appear normal, no suspicious masses, no skin or nipple changes or axillary nodes. Self exam is encouraged. Pelvis: normal external genitalia, vulva, vagina, cervix, uterus and adnexa, PAP: Pap smear done today, thin-prep method, HPV test.   Extremities - Extremities normal. No deformities, edema, or skin discoloration  Musculoskeletal - Spine ROM normal. Muscular strength intact. Peripheral pulses - radial=4/4,, femoral=4/4, popliteal=4/4, dorsalis pedis=4/4,  Neuro - Gait normal. Reflexes normal and symmetric. Sensation grossly normal.  No focal weakness  Psych - euthymic, no suicidal thoughts or ideation, mood stable. Exam chaperoned by female assistant.        Orders Only on 12/07/2022   Component Date Value Ref Range Status    Hemoglobin A1C 12/07/2022 5.0  See comment % Final    Comment: Comment:  Diagnosis of Diabetes: > or = 6.5%  Increased risk of diabetes (Prediabetes): 5.7-6.4%  Glycemic Control: Nonpregnant Adults: <7.0%                    Pregnant: <6.0%        eAG 12/07/2022 96.8  mg/dL Final    Vitamin B-12 12/07/2022 865  211 - 911 pg/mL Final    Folate 12/07/2022 18.04  4.78 - 24.20 ng/mL Final    Comment: Effective 11-15-16 10:00am EST  Please note reference ranges have  changed for Folate. T4 Free 12/07/2022 1.2  0.9 - 1.8 ng/dL Final    TSH 12/07/2022 1.32  0.27 - 4.20 uIU/mL Final    Sodium 12/07/2022 140  136 - 145 mmol/L Final    Potassium 12/07/2022 4.4  3.5 - 5.1 mmol/L Final    Chloride 12/07/2022 101  99 - 110 mmol/L Final    CO2 12/07/2022 26  21 - 32 mmol/L Final    Anion Gap 12/07/2022 13  3 - 16 Final    Glucose 12/07/2022 97  70 - 99 mg/dL Final    BUN 12/07/2022 10  7 - 20 mg/dL Final    Creatinine 12/07/2022 0.5 (A)  0.6 - 1.2 mg/dL Final    Est, Glom Filt Rate 12/07/2022 >60  >60 Final    Comment: Pediatric calculator link  [email or web address]. [email or web address]/kdoqi/gfr_[email or web address]  Effective Oct 3, 2022  These results are not intended for use in patients  <25years of age. eGFR results are calculated without  a race factor using the 2021 CKD-EPI equation. Careful  clinical correlation is recommended, particularly when  comparing to results calculated using previous equations. The CKD-EPI equation is less accurate in patients with  extremes of muscle mass, extra-renal metabolism of  creatinine, excessive creatinine ingestion, or following  therapy that affects renal tubular secretion.       Calcium 12/07/2022 9.4  8.3 - 10.6 mg/dL Final    Total Protein 12/07/2022 6.7  6.4 - 8.2 g/dL Final    Albumin 12/07/2022 4.2  3.4 - 5.0 g/dL Final    Albumin/Globulin Ratio 12/07/2022 1.7  1.1 - 2.2 Final    Total Bilirubin 12/07/2022 0.7  0.0 - 1.0 mg/dL Final    Alkaline Phosphatase 12/07/2022 75  40 - 129 U/L Final    ALT 12/07/2022 10  10 - 40 U/L Final    AST 12/07/2022 15  15 - 37 U/L Final    Ferritin 12/07/2022 44.2  15.0 - 150.0 ng/mL Final    Iron 12/07/2022 259 (A)  37 - 145 ug/dL Final    TIBC 12/07/2022 283  260 - 445 ug/dL Final    Iron Saturation 12/07/2022 92 (A) 15 - 50 % Final    WBC 12/07/2022 6.6  4.0 - 11.0 K/uL Final    RBC 12/07/2022 4.14  4.00 - 5.20 M/uL Final    Hemoglobin 12/07/2022 14.2  12.0 - 16.0 g/dL Final    Hematocrit 12/07/2022 42.3  36.0 - 48.0 % Final    MCV 12/07/2022 102.2 (A)  80.0 - 100.0 fL Final    MCH 12/07/2022 34.2 (A)  26.0 - 34.0 pg Final    MCHC 12/07/2022 33.4  31.0 - 36.0 g/dL Final    RDW 12/07/2022 12.6  12.4 - 15.4 % Final    Platelets 77/04/9430 289  135 - 450 K/uL Final    MPV 12/07/2022 9.2  5.0 - 10.5 fL Final    Neutrophils % 12/07/2022 59.4  % Final    Lymphocytes % 12/07/2022 30.0  % Final    Monocytes % 12/07/2022 7.7  % Final    Eosinophils % 12/07/2022 2.3  % Final    Basophils % 12/07/2022 0.6  % Final    Neutrophils Absolute 12/07/2022 3.9  1.7 - 7.7 K/uL Final    Lymphocytes Absolute 12/07/2022 2.0  1.0 - 5.1 K/uL Final    Monocytes Absolute 12/07/2022 0.5  0.0 - 1.3 K/uL Final    Eosinophils Absolute 12/07/2022 0.2  0.0 - 0.6 K/uL Final    Basophils Absolute 12/07/2022 0.0  0.0 - 0.2 K/uL Final    Vit D, 25-Hydroxy 12/08/2022 55.9  >=30 ng/mL Final    Comment: <=20 ng/mL. ........... [employer] Deficient  21-29 ng/mL. ......... [employer] Insufficient  >=30 ng/mL. ........ [employer] Sufficient      Cholesterol, Total 12/08/2022 190  0 - 199 mg/dL Final    Triglycerides 12/08/2022 56  0 - 150 mg/dL Final    HDL 12/08/2022 88 (A)  40 - 60 mg/dL Final    Comment: An HDL cholesterol less than 40 mg/dL is low and  constitutes a coronary heart disease risk factor. An HDL cholesterol greater than 60 mg/dL is a  negative risk factor for coronary heart disease. LDL Calculated 12/08/2022 91  <100 mg/dL Final    VLDL Cholesterol Calculated 12/08/2022 11  Not Established mg/dL Final        ASSESSMENT / PLAN:    1. Routine general medical examination at a health care facility  No focal abnormalities on exam  Anticipatory guidance discussed. - Lipid Panel; Future    2.  Diverticulitis of colon  Resolved w/o recurrent sx  Cont f/u with dr. Hector William  Discussed surgery but ok to monitor @ this time  Will monitor    3. Diverticulosis  As above, f/u recurrent abd pain, n/v    4. Stress  Doing ok, cont supportive therapy    5. Essential hypertension, benign  Stable @ goal, controlled    6. Well woman exam  Pap/pelvic/breast exam today  - PAP SMEAR    7. Hereditary hemochromatosis ([facility] [address]  Cont f/u with heme/onc  Cont periodic phlebotomy    8. Primary insomnia  Stable w/o progressive sx  Cont prn xanax,   See [facility]  - ALPRAZolam (XANAX) 0.5 MG tablet; Take 1 tablet by mouth 3 times daily as needed for Sleep or Anxiety for up to 30 days. Dispense: 90 tablet; Refill: 0    9. Anxiety  Stable despite stressors  - ALPRAZolam (XANAX) 0.5 MG tablet; Take 1 tablet by mouth 3 times daily as needed for Sleep or Anxiety for up to 30 days. Dispense: 90 tablet; Refill: 0    10. Need for shingles vaccine  Pt is due for vaccination for shingles. Pt is given information on Shingrix vaccine, and the need for 2 doses spaced 2-6 months apart. Pt encouraged to check with insurance on coverage of vaccination and aware that if they have primary medicare coverage, that the shingles vaccination is not covered in office and they need to get done through the pharmacy. 11. Encounter for screening mammogram for malignant neoplasm of breast  Due mammo  Working on getting set up           Follow-up appointment:   Pending additional bloodwork results  1 year  Prn     Discussed use, benefit, and side effects of all prescribed medications. Barriers to medication compliance addressed. All patient questions answered. Pt voiced understanding. When applicable, patient's outside records were reviewed through [employer]. The patient has signed appropriate paperworks/consents.

## 2023-01-17 ENCOUNTER — TELEMEDICINE (OUTPATIENT)
Dept: PSYCHOLOGY | Age: 63
End: 2023-01-17
Payer: COMMERCIAL

## 2023-01-17 DIAGNOSIS — F33.1 MAJOR DEPRESSIVE DISORDER, RECURRENT EPISODE, MODERATE WITH ANXIOUS DISTRESS (HCC): Primary | ICD-10-CM

## 2023-01-17 PROCEDURE — 90832 PSYTX W PT 30 MINUTES: CPT | Performed by: PSYCHOLOGIST

## 2023-01-17 NOTE — PATIENT INSTRUCTIONS
Goals:  Consider reading When the Body Says No by Loc Darell  Let your family know that you will respect their boundaries. You will be there to offer information if they want to ask. Consider reading The Dance of Anger by Jeimy Naqvi  Consider listening to The [address] podcast episode with Wero Dalton about Internal Family Systems (IFS) therapy  When you notice a part of yourself (e.g., perfectionism) taking over, internally acknowledge and validate the part (e.g., \"I recognize that you're there. I feel you trying to help me, to protect me in the only way you know how. Thank you for being there for me. \"). The goal is to meet that part with openness, compassion, and validation. Once that part feels understood, then you can ask it to soften or give you some space. Consider listening to binaural beats to send messages of safety to your nervous system. You can search for \"binaural beats for anxiety and stress management\" on [employer], [employer], or [employer].

## 2023-01-17 NOTE — PROGRESS NOTES
Behavioral Health Consultation  Sheryle Sicks, Psy.D. Psychologist  1/17/2023  3-3:30 PM EDT      Time spent with Patient: 30 minutes   This is patient's fourth [facility] appointment since her last episode of care ended in 2017. Reason for Consult: Stress mgmt  Referring Provider: MD Alan Duarte [address] [facility] (During [facility]- public health emergency)    Henri Martin was evaluated through a synchronous (real-time) audio-video encounter using HIPAA-compliant technology. The patient (or guardian, if applicable) is aware that this is a billable service, which includes applicable co-pays. This Virtual Visit was conducted with patient's (and/or legal guardian's) consent. The visit was conducted pursuant to the emergency declaration under the [address] [place] [address] authority and the [employer] General Act. Patient identification was verified, and a caregiver was present when appropriate. The patient was located in a state where the provider was licensed to provide care. Conducted a risk-benefit analysis and determined that the patient's presenting problems are consistent with the use of telepsychology. Determined that the patient has sufficient knowledge and skills in the use of technology enabling them to adequately benefit from telepsychology. It was determined that this patient was able to be properly treated without an in-person session. Patient verified current location at the beginning of the visit.     Verified the following information:  Patient's identification: Yes  Patient location: [facility] [address]  Patient's call back number: [phone]  Patient's emergency contact's name and number, as well as permission to contact them if needed:  Extended Emergency Contact Information  Primary Emergency Contact: [facility]  Address: [address] [place] [address] [address] [place] [address], [address] [place], [place] [address] [facility] Phone: [phone]  Mobile Phone: [phone]  Relation: Spouse    Provider location: [place], [place]      S:  Pt shared distress about family dynamics related to her brother suing pt and other family members. She has noticed more difficulty concentrating and sleeping. Trying to limit alcohol use but not always effectively maintaining control. Pt is struggling to set appropriate boundaries with family members. O:  Interventions:  -Supportive techniques  -Processed experiences / stressors / concerns  -Reinforced efforts towards self-care   -Explored family dynamics and health  -Discussed boundary setting with pt's sister  -Recommended binaural beats      A:  MSE:  Appearance: good hygiene  and appropriate attire  Attitude: cooperative and friendly  Consciousness: alert  Orientation: oriented to person, place, time, general circumstance  Memory: recent and remote memory intact  Attention/Concentration: intact during session  Psychomotor Activity: normal  Eye Contact: normal  Speech: normal rate and volume, well-articulated  Mood: anxious, dysphoric  Affect: congruent  Perception: within normal limits  Thought Content: within normal limits  Thought Process: logical, coherent and goal-directed  Insight: good  Judgment: intact  Ability to understand instructions: Yes  Ability to respond meaningfully: Yes  Morbid Ideation: no   Suicide Assessment: no suicidal ideation, plan, or intent. Appropriate for outpatient / telehealth care at this time. Homicidal Ideation: no      PHQ Scores 12/8/2022 7/15/2021 5/12/2020 7/9/2018 1/12/2017 12/15/2016   PHQ2 Score 0 0 0 0 2 3   PHQ9 Score 3 0 0 0 11 12     Interpretation of Total Score Depression Severity: 1-4 = Minimal depression, 5-9 = Mild depression, 10-14 = Moderate depression, 15-19 = Moderately severe depression, 20-27 = Severe depression    Diagnosis:    1.  Major depressive disorder, recurrent episode, moderate with anxious distress [facility])          Patient Active Problem List   Diagnosis    Rosacea    Eczema    Essential hypertension, benign    Raynaud disease    Diverticulosis    Primary osteoarthritis of both knees    Hemochromatosis    Diverticulitis of colon         Plan:  Pt interventions:  Supportive techniques, Provided Psychoeducation re: see above, Emphasized self-care as important for managing overall health, and Cognitive strategies to target see balanced thinking . Pt Behavioral Change Plan:  Pt set the following goals:  Consider reading When the Body Says No by Loc Lozoya  Let your family know that you will respect their boundaries. You will be there to offer information if they want to ask. Consider reading The Dance of Anger by [employer]  Consider listening to The [address] podcast episode with Smitha Keller about Internal Family Systems (IFS) therapy  When you notice a part of yourself (e.g., perfectionism) taking over, internally acknowledge and validate the part (e.g., \"I recognize that you're there. I feel you trying to help me, to protect me in the only way you know how. Thank you for being there for me. \"). The goal is to meet that part with openness, compassion, and validation. Once that part feels understood, then you can ask it to soften or give you some space. Consider listening to binaural beats to send messages of safety to your nervous system. You can search for \"binaural beats for anxiety and stress management\" on [employer], [employer], or [employer]. Pt scheduled a F/U virtual visit.

## 2023-02-20 ENCOUNTER — NURSE TRIAGE (OUTPATIENT)
Dept: OTHER | Facility: CLINIC | Age: 63
End: 2023-02-20

## 2023-03-06 ENCOUNTER — TELEPHONE (OUTPATIENT)
Dept: GYNECOLOGY | Age: 63
End: 2023-03-06

## 2023-03-06 NOTE — TELEPHONE ENCOUNTER
----- Message from Belen Dudley sent at 3/6/2023  9:12 AM EST -----  Subject: Message to Provider    QUESTIONS  Information for Provider? Patient is requesting to reschedule her   appointment with Dr. Ranjan Cash tomorrow. Can you please contact patient with   scheduling information?  ---------------------------------------------------------------------------  --------------  Alexandra Hernandez INFO  [phone]; OK to leave message on voicemail  ---------------------------------------------------------------------------  --------------  SCRIPT ANSWERS  Relationship to Patient?  Self

## 2023-03-07 ENCOUNTER — OFFICE VISIT (OUTPATIENT)
Dept: PSYCHOLOGY | Age: 63
End: 2023-03-07
Payer: COMMERCIAL

## 2023-03-07 DIAGNOSIS — F33.1 MAJOR DEPRESSIVE DISORDER, RECURRENT EPISODE, MODERATE WITH ANXIOUS DISTRESS (HCC): Primary | ICD-10-CM

## 2023-03-07 PROCEDURE — 90832 PSYTX W PT 30 MINUTES: CPT | Performed by: PSYCHOLOGIST

## 2023-03-07 NOTE — PROGRESS NOTES
Behavioral Health Consultation  Enma Varghese Psy.D. Psychologist  3/7/2023  3:07-3:30 PM EDT      Time spent with Patient: 27 minutes   This is patient's fifth [facility] appointment since her last episode of care ended in 2017. Reason for Consult: Stress mgmt  Referring Provider: MD Alan Peralta [address] 21 [facility] (During [id number] public health emergency)    Kendell Gaytan was evaluated through a synchronous (real-time) audio-video encounter using HIPAA-compliant technology. The patient (or guardian, if applicable) is aware that this is a billable service, which includes applicable co-pays. This Virtual Visit was conducted with patient's (and/or legal guardian's) consent. The visit was conducted pursuant to the emergency declaration under the [address] [place] [address], [address] [place] [address] authority and the [employer] General Act. Patient identification was verified, and a caregiver was present when appropriate. The patient was located in a state where the provider was licensed to provide care. Conducted a risk-benefit analysis and determined that the patient's presenting problems are consistent with the use of telepsychology. Determined that the patient has sufficient knowledge and skills in the use of technology enabling them to adequately benefit from telepsychology. It was determined that this patient was able to be properly treated without an in-person session. Patient verified current location at the beginning of the visit.     Verified the following information:  Patient's identification: Yes  Patient location: [facility] [address]  Patient's call back number: [phone]  Patient's emergency contact's name and number, as well as permission to contact them if needed:  Extended Emergency Contact Information  Primary Emergency Contact: [facility]  Address: [address] [facility], [address] [place] [address] Phone: [phone]  Mobile Phone: [phone]  Relation: Spouse    Provider location: [place]o Chun:  Pt tried to cancel this appt yesterday but is fine with keeping it since the timing worked out. Still dealing with family stress. Listening to binaural beats was helpful. Exercising daily. Drinking more alcohol than she'd like. Pt shared about ongoing family stress and difficulty with appropriate boundary setting. O:  Interventions:  -Supportive techniques  -Processed experiences / stressors / concerns  -Reinforced efforts towards self-care   -Explored family dynamics   -Practiced deep breathing during visit      A:  MSE:  Appearance: good hygiene  and appropriate attire  Attitude: cooperative and friendly  Consciousness: alert  Orientation: oriented to person, place, time, general circumstance  Memory: recent and remote memory intact  Attention/Concentration: intact during session  Psychomotor Activity: normal  Eye Contact: normal  Speech: normal rate and volume, well-articulated  Mood: anxious, dysphoric  Affect: congruent  Perception: within normal limits  Thought Content: within normal limits  Thought Process: logical, coherent and goal-directed  Insight: good  Judgment: intact  Ability to understand instructions: Yes  Ability to respond meaningfully: Yes  Morbid Ideation: no   Suicide Assessment: no suicidal ideation, plan, or intent. Appropriate for outpatient / telehealth care at this time. Homicidal Ideation: no      PHQ Scores 12/8/2022 7/15/2021 5/12/2020 7/9/2018 1/12/2017 12/15/2016   PHQ2 Score 0 0 0 0 2 3   PHQ9 Score 3 0 0 0 11 12     Interpretation of Total Score Depression Severity: 1-4 = Minimal depression, 5-9 = Mild depression, 10-14 = Moderate depression, 15-19 = Moderately severe depression, 20-27 = Severe depression    Diagnosis:    1.  Major depressive disorder, recurrent episode, moderate with anxious distress (HCC)            Patient Active Problem List   Diagnosis    Rosacea    Eczema    Essential hypertension, benign    Raynaud disease    Diverticulosis    Primary osteoarthritis of both knees    Hemochromatosis    Diverticulitis of colon         Plan:  Pt interventions:  Supportive techniques, Emphasized self-care as important for managing overall health, Cognitive strategies to target see balanced thinking, and Trained in relaxation strategies including see above .       Pt Behavioral Change Plan:  Pt set the following goals:  Consider reading When the Body Says No by Loc Mate  Let your family know that you will respect their boundaries. You will be there to offer information if they want to ask.   Consider reading The Dance of Anger by Fely Perez  Consider listening to The [employer] podcast episode with Erick Merida about Internal Family Systems (IFS) therapy  When you notice a part of yourself (e.g., perfectionism) taking over, internally acknowledge and validate the part (e.g., \"I recognize that you're there. I feel you trying to help me, to protect me in the only way you know how. Thank you for being there for me.\"). The goal is to meet that part with openness, compassion, and validation. Once that part feels understood, then you can ask it to soften or give you some space.  Consider listening to binaural beats to send messages of safety to your nervous system. You can search for \"binaural beats for anxiety and stress management\" on [employer], [employer], or [employer].    Pt scheduled a F/U virtual visit.

## 2023-04-18 ENCOUNTER — TELEPHONE (OUTPATIENT)
Dept: FAMILY MEDICINE CLINIC | Age: 63
End: 2023-04-18

## 2023-04-18 DIAGNOSIS — S90.229A SUBUNGUAL HEMATOMA OF FOOT, UNSPECIFIED LATERALITY, INITIAL ENCOUNTER: Primary | ICD-10-CM

## 2023-04-18 NOTE — TELEPHONE ENCOUNTER
Patient has a couple issues she needs help with, one concerning her right big toe and also her tailbone. Patient says the nail on her big toe is coming off, and it is discolored. There is a new nail growing in but if it should fall off her toe skin will be almost completely exposed. She is leaving for [place] on Thursday and wants to know if she can be seen on Wednesday so she can get a podiatrist referral. She has an appointment with Dr. Yue Slade on Wednesday at 2:30 and wants to know if she can be Dr. Cristina Orta' last patient. She is also having soreness near her tailbone when she is in a seated position or if she reclines in a chair. There is no pain when she is standing or laying all the way down. She would like to know what to do concerning this as well. Please call her concerning this matter.     [phone] (home)

## 2023-04-19 ENCOUNTER — OFFICE VISIT (OUTPATIENT)
Dept: PSYCHOLOGY | Age: 63
End: 2023-04-19
Payer: COMMERCIAL

## 2023-04-19 DIAGNOSIS — F33.1 MAJOR DEPRESSIVE DISORDER, RECURRENT EPISODE, MODERATE WITH ANXIOUS DISTRESS (HCC): Primary | ICD-10-CM

## 2023-04-19 PROCEDURE — 90832 PSYTX W PT 30 MINUTES: CPT | Performed by: PSYCHOLOGIST

## 2023-04-19 PROCEDURE — 1036F TOBACCO NON-USER: CPT | Performed by: PSYCHOLOGIST

## 2023-04-19 NOTE — TELEPHONE ENCOUNTER
Pt is scheduled for 4/27. Pt states she was wanting to get a referral for podiatry because she thinks she is going to lose her toenail due to blood being under her nail and per the  it is lose. She also believe it will need to be drilled to let the blood drain. Pt is trying to get in to podiatry today. can referral be placed before her appt?

## 2023-04-19 NOTE — PATIENT INSTRUCTIONS
Goals:  Consider reading When the Body Says No by Loc Darell  Let your family know that you will respect their boundaries. You will be there to offer information if they want to ask. Consider reading The Dance of Anger by Melany Killian  Consider listening to The [address] podcast episode with Peter Tabares about Internal Family Systems (IFS) therapy  When you notice a part of yourself (e.g., perfectionism) taking over, internally acknowledge and validate the part (e.g., \"I recognize that you're there. I feel you trying to help me, to protect me in the only way you know how. Thank you for being there for me. \"). The goal is to meet that part with openness, compassion, and validation. Once that part feels understood, then you can ask it to soften or give you some space. Consider listening to binaural beats to send messages of safety to your nervous system. You can search for \"binaural beats for anxiety and stress management\" on [employer], [employer], or [employer].

## 2023-04-19 NOTE — PROGRESS NOTES
Behavioral Health Consultation  Fletcher Lux Psy.D. Psychologist  4/19/2023  2:30-3 PM EDT      Time spent with Patient: 30 minutes   This is patient's sixth [facility] appointment since her last episode of care ended in 2017. Reason for Consult: Stress mgmt  Referring Provider: Saint Cromer, MD [place]      S:  Pt has been doing better overall. As pt reads The Dance of Anger, she is recognizing triangulation within her family relationships. Pt feels ambivalent about how much to get involved in issues with others. Pt has cut back to 1/4 pill of Xanax. O:  Interventions:  -Supportive techniques  -Processed experiences / stressors / concerns  -Reinforced efforts towards self-care   -Explored family dynamics. Highlighted triangulation.  -Encouraged appropriate boundary setting that prioritizes pt's wellbeing more. A:  MSE:  Appearance: good hygiene  and appropriate attire  Attitude: cooperative and friendly  Consciousness: alert  Orientation: oriented to person, place, time, general circumstance  Memory: recent and remote memory intact  Attention/Concentration: intact during session  Psychomotor Activity: normal  Eye Contact: normal  Speech: normal rate and volume, well-articulated  Mood: anxious  Affect: congruent, brighter  Perception: within normal limits  Thought Content: within normal limits  Thought Process: logical, coherent and goal-directed  Insight: good  Judgment: intact  Ability to understand instructions: Yes  Ability to respond meaningfully: Yes  Morbid Ideation: no   Suicide Assessment: no suicidal ideation, plan, or intent. Appropriate for outpatient / telehealth care at this time.   Homicidal Ideation: no      PHQ Scores 12/8/2022 7/15/2021 5/12/2020 7/9/2018 1/12/2017 12/15/2016   PHQ2 Score 0 0 0 0 2 3   PHQ9 Score 3 0 0 0 11 12     Interpretation of Total Score Depression Severity: 1-4 = Minimal depression, 5-9 = Mild depression, 10-14 =

## 2023-04-27 ENCOUNTER — OFFICE VISIT (OUTPATIENT)
Dept: FAMILY MEDICINE CLINIC | Age: 63
End: 2023-04-27
Payer: COMMERCIAL

## 2023-04-27 VITALS
WEIGHT: 141.8 LBS | RESPIRATION RATE: 12 BRPM | DIASTOLIC BLOOD PRESSURE: 72 MMHG | BODY MASS INDEX: 21.56 KG/M2 | SYSTOLIC BLOOD PRESSURE: 104 MMHG | HEART RATE: 55 BPM | TEMPERATURE: 97.3 F | OXYGEN SATURATION: 95 %

## 2023-04-27 DIAGNOSIS — S91.209A AVULSION OF TOENAIL, INITIAL ENCOUNTER: Primary | ICD-10-CM

## 2023-04-27 DIAGNOSIS — F41.9 ANXIETY: ICD-10-CM

## 2023-04-27 DIAGNOSIS — M53.3 COCCYGEAL PAIN: ICD-10-CM

## 2023-04-27 DIAGNOSIS — B35.1 ONYCHOMYCOSIS: ICD-10-CM

## 2023-04-27 DIAGNOSIS — K14.9 TONGUE IRRITATION: ICD-10-CM

## 2023-04-27 DIAGNOSIS — I10 ESSENTIAL HYPERTENSION, BENIGN: ICD-10-CM

## 2023-04-27 DIAGNOSIS — F51.01 PRIMARY INSOMNIA: ICD-10-CM

## 2023-04-27 DIAGNOSIS — K52.9 AGE (ACUTE GASTROENTERITIS): ICD-10-CM

## 2023-04-27 PROCEDURE — 3078F DIAST BP <80 MM HG: CPT | Performed by: FAMILY MEDICINE

## 2023-04-27 PROCEDURE — G8427 DOCREV CUR MEDS BY ELIG CLIN: HCPCS | Performed by: FAMILY MEDICINE

## 2023-04-27 PROCEDURE — 3017F COLORECTAL CA SCREEN DOC REV: CPT | Performed by: FAMILY MEDICINE

## 2023-04-27 PROCEDURE — 3074F SYST BP LT 130 MM HG: CPT | Performed by: FAMILY MEDICINE

## 2023-04-27 PROCEDURE — 99214 OFFICE O/P EST MOD 30 MIN: CPT | Performed by: FAMILY MEDICINE

## 2023-04-27 PROCEDURE — 1036F TOBACCO NON-USER: CPT | Performed by: FAMILY MEDICINE

## 2023-04-27 PROCEDURE — G8420 CALC BMI NORM PARAMETERS: HCPCS | Performed by: FAMILY MEDICINE

## 2023-04-27 RX ORDER — ALPRAZOLAM 0.5 MG/1
0.5 TABLET ORAL 3 TIMES DAILY PRN
Qty: 90 TABLET | Refills: 0 | Status: SHIPPED | OUTPATIENT
Start: 2023-04-27 | End: 2023-05-27

## 2023-04-27 RX ORDER — ALPRAZOLAM 0.5 MG/1
TABLET ORAL
Status: CANCELLED | OUTPATIENT
Start: 2023-04-27

## 2023-04-27 RX ORDER — TETRACYCLINE HYDROCHLORIDE 250 MG/1
250 CAPSULE ORAL 3 TIMES DAILY
Qty: 21 CAPSULE | Refills: 0 | Status: SHIPPED | OUTPATIENT
Start: 2023-04-27 | End: 2023-05-04

## 2023-04-27 SDOH — ECONOMIC STABILITY: HOUSING INSECURITY
IN THE LAST 12 MONTHS, WAS THERE A TIME WHEN YOU DID NOT HAVE A STEADY PLACE TO SLEEP OR SLEPT IN A SHELTER (INCLUDING NOW)?: NO

## 2023-04-27 SDOH — ECONOMIC STABILITY: FOOD INSECURITY: WITHIN THE PAST 12 MONTHS, YOU WORRIED THAT YOUR FOOD WOULD RUN OUT BEFORE YOU GOT MONEY TO BUY MORE.: NEVER TRUE

## 2023-04-27 SDOH — ECONOMIC STABILITY: INCOME INSECURITY: HOW HARD IS IT FOR YOU TO PAY FOR THE VERY BASICS LIKE FOOD, HOUSING, MEDICAL CARE, AND HEATING?: NOT HARD AT ALL

## 2023-04-27 SDOH — ECONOMIC STABILITY: FOOD INSECURITY: WITHIN THE PAST 12 MONTHS, THE FOOD YOU BOUGHT JUST DIDN'T LAST AND YOU DIDN'T HAVE MONEY TO GET MORE.: NEVER TRUE

## 2023-04-27 ASSESSMENT — PATIENT HEALTH QUESTIONNAIRE - PHQ9
6. FEELING BAD ABOUT YOURSELF - OR THAT YOU ARE A FAILURE OR HAVE LET YOURSELF OR YOUR FAMILY DOWN: 0
4. FEELING TIRED OR HAVING LITTLE ENERGY: 0
SUM OF ALL RESPONSES TO PHQ QUESTIONS 1-9: 0
10. IF YOU CHECKED OFF ANY PROBLEMS, HOW DIFFICULT HAVE THESE PROBLEMS MADE IT FOR YOU TO DO YOUR WORK, TAKE CARE OF THINGS AT HOME, OR GET ALONG WITH OTHER PEOPLE: 0
2. FEELING DOWN, DEPRESSED OR HOPELESS: 0
SUM OF ALL RESPONSES TO PHQ QUESTIONS 1-9: 0
SUM OF ALL RESPONSES TO PHQ QUESTIONS 1-9: 0
SUM OF ALL RESPONSES TO PHQ9 QUESTIONS 1 & 2: 0
8. MOVING OR SPEAKING SO SLOWLY THAT OTHER PEOPLE COULD HAVE NOTICED. OR THE OPPOSITE, BEING SO FIGETY OR RESTLESS THAT YOU HAVE BEEN MOVING AROUND A LOT MORE THAN USUAL: 0
5. POOR APPETITE OR OVEREATING: 0
SUM OF ALL RESPONSES TO PHQ QUESTIONS 1-9: 0
7. TROUBLE CONCENTRATING ON THINGS, SUCH AS READING THE NEWSPAPER OR WATCHING TELEVISION: 0
9. THOUGHTS THAT YOU WOULD BE BETTER OFF DEAD, OR OF HURTING YOURSELF: 0
1. LITTLE INTEREST OR PLEASURE IN DOING THINGS: 0
3. TROUBLE FALLING OR STAYING ASLEEP: 0

## 2023-05-11 ENCOUNTER — OFFICE VISIT (OUTPATIENT)
Dept: GYNECOLOGY | Age: 63
End: 2023-05-11

## 2023-05-11 VITALS
WEIGHT: 139.4 LBS | OXYGEN SATURATION: 98 % | HEIGHT: 68 IN | BODY MASS INDEX: 21.13 KG/M2 | HEART RATE: 71 BPM | DIASTOLIC BLOOD PRESSURE: 84 MMHG | SYSTOLIC BLOOD PRESSURE: 136 MMHG

## 2023-05-11 DIAGNOSIS — Z01.419 WELL WOMAN EXAM WITH ROUTINE GYNECOLOGICAL EXAM: Primary | ICD-10-CM

## 2023-05-11 RX ORDER — ESTRADIOL 10 UG/1
10 INSERT VAGINAL
Qty: 24 TABLET | Refills: 3 | Status: SHIPPED | OUTPATIENT
Start: 2023-05-11

## 2023-05-14 ASSESSMENT — ENCOUNTER SYMPTOMS
RESPIRATORY NEGATIVE: 1
ALLERGIC/IMMUNOLOGIC NEGATIVE: 1
EYES NEGATIVE: 1
GASTROINTESTINAL NEGATIVE: 1

## 2023-05-15 ENCOUNTER — TELEPHONE (OUTPATIENT)
Dept: FAMILY MEDICINE CLINIC | Age: 63
End: 2023-05-15

## 2023-05-15 NOTE — TELEPHONE ENCOUNTER
Pt returning call for [employer]. Pt states that overall she is feeling much better and stomach cramps have diminished as well as her fever. Pt stated that she did not need to take any abx and is feeling better on her own. No additional questions or concerns at this time.

## 2023-06-05 ENCOUNTER — TELEPHONE (OUTPATIENT)
Dept: FAMILY MEDICINE CLINIC | Age: 63
End: 2023-06-05

## 2023-06-05 NOTE — TELEPHONE ENCOUNTER
Spoke to pt and gave information of GI doctor that did her colonoscopy in 2020. Pt states she is going to call and schedule an appointment with GI she feels she is having some discomfort and inflammation episodes lately and wanted to know if there is anything she can do while she waiting to get in.  No diarrhea no fever, no blood in stool

## 2023-06-05 NOTE — TELEPHONE ENCOUNTER
Patient called stating she wants to know the name of the [facility] doctor who did her colonoscopy. Please contact patient about this matter @ [phone] (home)    Patient states its urgent.

## 2023-06-08 ENCOUNTER — TELEPHONE (OUTPATIENT)
Dept: GYNECOLOGY | Age: 63
End: 2023-06-08

## 2023-06-08 NOTE — TELEPHONE ENCOUNTER
Called and spoke  to patient she said she went on goggle and research this and she has the possible side effects per [employer]    Related full message to her from Dr Justino Fry. She has some follow  up questions     If she misses a dose is it okay that she takes it a day later. She also wants to know if there is a certain way she needs to insert this standing or and or laying down she read both ways and wanted to know the correct way. If she should go off a week and and take for 3 weeks is that okay    She also says she spoke to both he PCP and LANDON ORONA and she felt they were not help to her.

## 2023-06-08 NOTE — TELEPHONE ENCOUNTER
Call patient and tell that the vaginal estrogen does not go into the blood stream.       I think something else might be going on-she needs to contact her PCP.

## 2023-06-08 NOTE — TELEPHONE ENCOUNTER
Tell patient to take this twice a week. Place a night-twice a week. She should place lying down. If she misses it, it is ok. Tell her to go to urgent care if she truly has fever and diarrhea.

## 2023-06-08 NOTE — TELEPHONE ENCOUNTER
Patient feels that estradiol might be giving her bad side effects    Symptoms: Having flare up with diarrhea, chills, very dry nose (crust), andcramping      Wanted to know if Dr Justino Fry thinks she should stop taking the medication.  Says that she started a clear diet and its not as bad    Patient can be reached at [phone]

## 2023-07-27 ENCOUNTER — TELEMEDICINE (OUTPATIENT)
Dept: PSYCHOLOGY | Age: 63
End: 2023-07-27
Payer: COMMERCIAL

## 2023-07-27 DIAGNOSIS — F33.1 MAJOR DEPRESSIVE DISORDER, RECURRENT EPISODE, MODERATE WITH ANXIOUS DISTRESS (HCC): Primary | ICD-10-CM

## 2023-07-27 PROCEDURE — 90832 PSYTX W PT 30 MINUTES: CPT | Performed by: PSYCHOLOGIST

## 2023-07-27 NOTE — PROGRESS NOTES
Behavioral Health Consultation  Amor Farah Psy.D. Psychologist  7/27/2023  2:30-3 PM EDT      Time spent with Patient: 30 minutes   This is patient's sixth [facility] appointment since her last episode of care ended in 2017. Reason for Consult: Stress mgmt  Referring Provider: Ana Ralph MD  [address] [place] [address] & [facility] [address] [facility] -- Audio/Visual  }  Graeme Wild was evaluated through a synchronous (real-time) audio-video encounter using HIPAA-compliant technology. The patient (or guardian, if applicable) is aware that this is a billable service, which includes applicable co-pays. This Virtual Visit was conducted with patient's (and/or legal guardian's) consent. Patient identification was verified, and a caregiver was present when appropriate. The patient was located in a state where the provider was licensed to provide care. Conducted a risk-benefit analysis and determined that the patient's presenting problems are consistent with the use of telepsychology. Determined that the patient has sufficient knowledge and skills in the use of technology enabling them to adequately benefit from telepsychology. It was determined that this patient was able to be properly treated without an in-person session. Patient verified current location at the beginning of the visit.     Verified the following information:  Patient's identification: Yes  Patient location: [address] [place] [address]  Patient's call back number: [phone]  Patient's emergency contact's name and number, as well as permission to contact them if needed:  Extended Emergency Contact Information  Primary Emergency Contact: [facility]  Address: [address] [place] [address], [address] [place] [address] Jag Patter of [address] [address] Phone: [phone]  Mobile Phone: [phone]  Relation: Spouse    Provider location: [facility]    S:  Pt tried to cancel this appointment the other day via text reminder but she is

## 2023-07-27 NOTE — PATIENT INSTRUCTIONS
Goals:  Consider reading When the Body Says No by Loc Darell  Let your family know that you will respect their boundaries. You will be there to offer information if they want to ask. Consider reading The Dance of Anger by Annel Hendricks  Consider listening to The [facility] podcast episode with Ursula Guido about Internal Family Systems (IFS) therapy  When you notice a part of yourself (e.g., perfectionism) taking over, internally acknowledge and validate the part (e.g., \"I recognize that you're there. I feel you trying to help me, to protect me in the only way you know how. Thank you for being there for me. \"). The goal is to meet that part with openness, compassion, and validation. Once that part feels understood, then you can ask it to soften or give you some space. Consider listening to binaural beats to send messages of safety to your nervous system. You can search for \"binaural beats for anxiety and stress management\" on Weft, Apple Music, or mLED.

## 2023-09-26 ENCOUNTER — TELEPHONE (OUTPATIENT)
Dept: FAMILY MEDICINE CLINIC | Age: 63
End: 2023-09-26

## 2023-09-26 NOTE — TELEPHONE ENCOUNTER
Pt has a VV with Dr. Tirso Orozco on 9-28-23 at @2 pm. If she is feeling better by Thursday she will call in the morning to change her VV to an IN person appt .  Ok per Dr. Tirso Orozco.

## 2023-09-28 ENCOUNTER — OFFICE VISIT (OUTPATIENT)
Dept: PSYCHOLOGY | Age: 63
End: 2023-09-28
Payer: COMMERCIAL

## 2023-09-28 DIAGNOSIS — F33.1 MAJOR DEPRESSIVE DISORDER, RECURRENT EPISODE, MODERATE WITH ANXIOUS DISTRESS (HCC): Primary | ICD-10-CM

## 2023-09-28 PROCEDURE — 90832 PSYTX W PT 30 MINUTES: CPT | Performed by: PSYCHOLOGIST

## 2023-09-28 PROCEDURE — 1036F TOBACCO NON-USER: CPT | Performed by: PSYCHOLOGIST

## 2023-09-28 NOTE — PATIENT INSTRUCTIONS
Goals:  Consider reading When the Body Says No by Loc Darell  Let your family know that you will respect their boundaries. You will be there to offer information if they want to ask. Consider reading The Dance of Anger by Moriah Gurrola  Consider listening to The [facility] podcast episode with Nathalia Quijano about Internal Family Systems (IFS) therapy  When you notice a part of yourself (e.g., perfectionism) taking over, internally acknowledge and validate the part (e.g., \"I recognize that you're there. I feel you trying to help me, to protect me in the only way you know how. Thank you for being there for me. \"). The goal is to meet that part with openness, compassion, and validation. Once that part feels understood, then you can ask it to soften or give you some space. Consider listening to binaural beats to send messages of safety to your nervous system. You can search for \"binaural beats for anxiety and stress management\" on reQwip, Apple Music, or Firetide.

## 2023-09-28 NOTE — PROGRESS NOTES
Behavioral Health Consultation  Mariposa Kaufman Psy.D. Psychologist  9/28/2023  2-2:30 PM EDT      Time spent with Patient: 30 minutes   This is patient's seventh [facility] appointment since her last episode of care ended in 2017. Reason for Consult: Stress mgmt  Referring Provider: Roseline Fofana MD  [address] [place] [address] [address]    S:  Pt reflected on recent stress. Pt has been working on the [employer] and [place] issues that are ongoing. Reflected on interpersonal stressors following her son's wedding. Pt is feeling like she's not good enough. Not sleeping well d/t anxiety. Noticing a buzzing in her head. Drinking alcohol some nights to cope. O:  Interventions:  -Supportive techniques  -Processed experiences / stressors / concerns  -Reinforced importance of efforts towards self-care   -Explored family dynamics and their impact on pt's wellbeing      A:  MSE:  Appearance: good hygiene  and appropriate attire  Attitude: cooperative and friendly, tearful  Consciousness: alert  Orientation: oriented to person, place, time, general circumstance  Memory: recent and remote memory intact  Attention/Concentration: intact during session  Psychomotor Activity: normal  Eye Contact: normal  Speech: normal rate and volume, well-articulated  Mood: anxious, dysphoric  Affect: congruent  Perception: within normal limits  Thought Content: within normal limits  Thought Process: logical, coherent and goal-directed  Insight: good  Judgment: intact  Ability to understand instructions: Yes  Ability to respond meaningfully: Yes  Morbid Ideation: no   Suicide Assessment: no suicidal ideation, plan, or intent. Appropriate for outpatient / telehealth care at this time.   Homicidal Ideation: no          4/27/2023     2:59 PM 12/8/2022     2:10 PM 7/15/2021    10:21 AM 5/12/2020     1:55 PM 7/9/2018     8:52 AM 1/12/2017     1:52 PM 12/15/2016     5:05 PM   PHQ Scores   PHQ2 Score 0 0 0 0 0 2 3   PHQ9 Score 0 3 0 0 0

## 2023-10-12 ENCOUNTER — TELEPHONE (OUTPATIENT)
Dept: GYNECOLOGY | Age: 63
End: 2023-10-12

## 2023-10-12 NOTE — TELEPHONE ENCOUNTER
Patient would like to speak directly to Dr. Addison Vega about hormone therapy she has started , pt believes it causes a few gastro problems for her and would like to discuss stopping for a while to see if it'll help       Patient can be reached at [phone]

## 2023-10-12 NOTE — TELEPHONE ENCOUNTER
Spoke with patient. Told her she can stop vaginal estrogen to see how she feels off of this. She can restart at any time.

## 2023-11-15 ENCOUNTER — TELEPHONE (OUTPATIENT)
Dept: FAMILY MEDICINE CLINIC | Age: 63
End: 2023-11-15

## 2023-11-15 DIAGNOSIS — H93.13 TINNITUS OF BOTH EARS: Primary | ICD-10-CM

## 2023-11-15 NOTE — TELEPHONE ENCOUNTER
Pt wants to see an ENT for a ringing in her left ear  It's more of a ringing in her head but more on the left side  Does she need a referral?

## 2023-11-16 NOTE — TELEPHONE ENCOUNTER
Left message for pt to return call to the office, please relay information to her regarding referral to ENT      [facility] Jayashree Paul MD   [address], [address] [place] [address]   Ph: [phone]

## 2023-11-27 ENCOUNTER — OFFICE VISIT (OUTPATIENT)
Dept: ENT CLINIC | Age: 63
End: 2023-11-27
Payer: COMMERCIAL

## 2023-11-27 ENCOUNTER — PROCEDURE VISIT (OUTPATIENT)
Dept: AUDIOLOGY | Age: 63
End: 2023-11-27
Payer: COMMERCIAL

## 2023-11-27 VITALS
WEIGHT: 147 LBS | HEART RATE: 57 BPM | BODY MASS INDEX: 22.28 KG/M2 | TEMPERATURE: 97.3 F | RESPIRATION RATE: 16 BRPM | HEIGHT: 68 IN | SYSTOLIC BLOOD PRESSURE: 156 MMHG | DIASTOLIC BLOOD PRESSURE: 84 MMHG

## 2023-11-27 DIAGNOSIS — H93.12 TINNITUS OF LEFT EAR: Primary | ICD-10-CM

## 2023-11-27 DIAGNOSIS — H93.12 TINNITUS OF LEFT EAR: ICD-10-CM

## 2023-11-27 DIAGNOSIS — H90.3 SENSORINEURAL HEARING LOSS (SNHL) OF BOTH EARS: Primary | ICD-10-CM

## 2023-11-27 DIAGNOSIS — H90.42 SENSORINEURAL HEARING LOSS (SNHL) OF LEFT EAR WITH UNRESTRICTED HEARING OF RIGHT EAR: ICD-10-CM

## 2023-11-27 PROCEDURE — 99204 OFFICE O/P NEW MOD 45 MIN: CPT | Performed by: OTOLARYNGOLOGY

## 2023-11-27 PROCEDURE — 3079F DIAST BP 80-89 MM HG: CPT | Performed by: OTOLARYNGOLOGY

## 2023-11-27 PROCEDURE — G8420 CALC BMI NORM PARAMETERS: HCPCS | Performed by: OTOLARYNGOLOGY

## 2023-11-27 PROCEDURE — 1036F TOBACCO NON-USER: CPT | Performed by: OTOLARYNGOLOGY

## 2023-11-27 PROCEDURE — 3077F SYST BP >= 140 MM HG: CPT | Performed by: OTOLARYNGOLOGY

## 2023-11-27 PROCEDURE — 92557 COMPREHENSIVE HEARING TEST: CPT | Performed by: AUDIOLOGIST

## 2023-11-27 PROCEDURE — G8427 DOCREV CUR MEDS BY ELIG CLIN: HCPCS | Performed by: OTOLARYNGOLOGY

## 2023-11-27 PROCEDURE — 3017F COLORECTAL CA SCREEN DOC REV: CPT | Performed by: OTOLARYNGOLOGY

## 2023-11-27 PROCEDURE — G8484 FLU IMMUNIZE NO ADMIN: HCPCS | Performed by: OTOLARYNGOLOGY

## 2023-11-27 PROCEDURE — 92567 TYMPANOMETRY: CPT | Performed by: AUDIOLOGIST

## 2023-11-27 NOTE — PROGRESS NOTES
CHIEF COMPLAINT: Tinnitus    HISTORY OF PRESENT ILLNESS:  59 y.o. female who presents with tinnitus of 2 months duration. Mostly left sided. Worse in quiet environment. Makes it difficult to concentrate. Insidious onset. Seems to be getting better. Non pulsatile. Hearing is subjectively good. No ear fullness.     PAST MEDICAL HISTORY:   Social History     Tobacco Use   Smoking Status Former    Packs/day: 0.50    Years: 5.00    Additional pack years: 0.00    Total pack years: 2.50    Types: Cigarettes    Quit date: 1998    Years since quittin.2   Smokeless Tobacco Never                                                    Social History     Substance and Sexual Activity   Alcohol Use Yes    Comment: socially, 5x per week                                                    Current Outpatient Medications:     Estradiol (YUVAFEM) 10 MCG TABS vaginal tablet, Place 1 tablet vaginally Twice a Week, Disp: 24 tablet, Rfl: 3    ciclopirox (PENLAC) 8 % solution, Apply topically nightly., Disp: 6 mL, Rfl: 5    B Complex Vitamins (B COMPLEX-B12 PO), Take by mouth, Disp: , Rfl:     Omega 3-6-9 Fatty Acids (OMEGA-3-6-9 PO), Take by mouth, Disp: , Rfl:     Calcium Carbonate-Vit D-Min (CALCIUM 1200 PO), Take by mouth, Disp: , Rfl:     Bacillus Coagulans-Inulin (PROBIOTIC) 1-250 BILLION-MG CAPS, Take 1 capsule by mouth daily, Disp: , Rfl:     MAGNESIUM GLYCINATE PLUS PO, Take 400 mg by mouth daily, Disp: , Rfl:     ALPRAZolam (XANAX) 0.5 MG tablet, TK 1 T PO  TID PRN FOR SLEEP OR ANXIETY, Disp: , Rfl:     Psyllium (METAMUCIL FIBER PO), Take by mouth , Disp: , Rfl:     Multiple Vitamins-Minerals (THERAPEUTIC MULTIVITAMIN-MINERALS) tablet, Take 1 tablet by mouth daily, Disp: , Rfl:     Vitamin D (CHOLECALCIFEROL) 1000 UNITS CAPS capsule, Take 1 capsule by mouth daily, Disp: , Rfl:                                                  Past Medical History:   Diagnosis Date    Benign essential HTN     Diverticulosis     Exfoliative

## 2023-11-27 NOTE — PROGRESS NOTES
present at 500 Hz and 8000 Hz with left ear worse than right ear. Reliability: Good  Transducer: Inserts    See scanned audiogram dated 11/27/2023 for results. PATIENT EDUCATION:       The following items were discussed with the patient:   - Good Communication Strategies  - Hearing Loss and Hearing Aids  - Tinnitus Management Strategies      Educational information was shared in the After Visit Summary. RECOMMENDATIONS:                                                                                                                                                                                                                                                                      The following items are recommended based on patient report and results from today's appointment:  - Continue medical follow-up with Elizabeth Galvan MD.  - Retest hearing as medically indicated and/or sooner if a change in hearing is noted. - If desired, schedule a Hearing Aid Evaluation (HAE) appointment to discuss hearing aid options. - Utilize \"Good Communication Strategies\" as discussed to assist in speech understanding with communication partners. - Maintain a sound enriched environment to assist in the management of tinnitus symptoms. [facility] [place], [place]  Audiologist       Chart CC'd to:  Elizabeth Galvan MD      Degree of   Hearing Sensitivity dB Range   Within Normal Limits (WNL) 0 - 20   Mild 20 - 40   Moderate 40 - 55   Moderately-Severe 55 - 70   Severe 70 - 90   Profound 90 +

## 2023-11-30 ENCOUNTER — TELEPHONE (OUTPATIENT)
Dept: ENT CLINIC | Age: 63
End: 2023-11-30

## 2023-12-04 ENCOUNTER — TELEPHONE (OUTPATIENT)
Dept: ENT CLINIC | Age: 63
End: 2023-12-04

## 2023-12-04 NOTE — TELEPHONE ENCOUNTER
Patient is now asking for MRI order to be faxed to   please fax order [phone] and please call patient when order is faxed

## 2023-12-12 ENCOUNTER — OFFICE VISIT (OUTPATIENT)
Dept: FAMILY MEDICINE CLINIC | Age: 63
End: 2023-12-12
Payer: COMMERCIAL

## 2023-12-12 VITALS
OXYGEN SATURATION: 98 % | HEART RATE: 80 BPM | WEIGHT: 146.8 LBS | BODY MASS INDEX: 22.25 KG/M2 | HEIGHT: 68 IN | RESPIRATION RATE: 14 BRPM | TEMPERATURE: 98.1 F | DIASTOLIC BLOOD PRESSURE: 60 MMHG | SYSTOLIC BLOOD PRESSURE: 124 MMHG

## 2023-12-12 DIAGNOSIS — Z13.6 SCREENING, ISCHEMIC HEART DISEASE: ICD-10-CM

## 2023-12-12 DIAGNOSIS — H93.13 TINNITUS OF BOTH EARS: ICD-10-CM

## 2023-12-12 DIAGNOSIS — I73.00 RAYNAUD'S DISEASE WITHOUT GANGRENE: ICD-10-CM

## 2023-12-12 DIAGNOSIS — E83.110 HEREDITARY HEMOCHROMATOSIS (HCC): ICD-10-CM

## 2023-12-12 DIAGNOSIS — I10 ESSENTIAL HYPERTENSION, BENIGN: ICD-10-CM

## 2023-12-12 DIAGNOSIS — F51.01 PRIMARY INSOMNIA: ICD-10-CM

## 2023-12-12 DIAGNOSIS — F41.9 ANXIETY: ICD-10-CM

## 2023-12-12 DIAGNOSIS — Z23 NEED FOR SHINGLES VACCINE: ICD-10-CM

## 2023-12-12 DIAGNOSIS — Z00.00 ROUTINE GENERAL MEDICAL EXAMINATION AT A HEALTH CARE FACILITY: Primary | ICD-10-CM

## 2023-12-12 PROCEDURE — 3074F SYST BP LT 130 MM HG: CPT | Performed by: FAMILY MEDICINE

## 2023-12-12 PROCEDURE — 99396 PREV VISIT EST AGE 40-64: CPT | Performed by: FAMILY MEDICINE

## 2023-12-12 PROCEDURE — 3078F DIAST BP <80 MM HG: CPT | Performed by: FAMILY MEDICINE

## 2023-12-12 PROCEDURE — 90471 IMMUNIZATION ADMIN: CPT | Performed by: FAMILY MEDICINE

## 2023-12-12 PROCEDURE — G8484 FLU IMMUNIZE NO ADMIN: HCPCS | Performed by: FAMILY MEDICINE

## 2023-12-12 PROCEDURE — 90715 TDAP VACCINE 7 YRS/> IM: CPT | Performed by: FAMILY MEDICINE

## 2023-12-12 RX ORDER — ALPRAZOLAM 0.5 MG/1
0.5 TABLET ORAL 3 TIMES DAILY PRN
Qty: 90 TABLET | Refills: 0 | Status: SHIPPED | OUTPATIENT
Start: 2023-12-12 | End: 2023-12-12 | Stop reason: SDUPTHER

## 2023-12-12 RX ORDER — ALPRAZOLAM 0.5 MG/1
0.5 TABLET ORAL 3 TIMES DAILY PRN
Qty: 90 TABLET | Refills: 1 | Status: SHIPPED | OUTPATIENT
Start: 2023-12-12 | End: 2024-02-10

## 2023-12-12 NOTE — PROGRESS NOTES
Here for well checkup, physical.  Pt has been doing well but has been having some TMJ/dental issues. Pt has been through some moderate stressors over the summer, and has had some persistent irritation in TMJ on L. Pt had felt some buzzing sensation in head, and L side sx. Pt did get over see ENT and they evaluated her, and did have hearing test that showed some mild asymmetry. Pt does have order for MRI, will be done in a few weeks at [facility]. Pt had 2nd opinion with ENT at  (dr. Liz Setting). Pt working closely with dentist as well, has been wearing a retainer and does seem to help. Pt states that stress level have calmed down a bit, and is through her son's wedding. Pt is very detailed oriented and that causes some increase stress. Pt has been working with dr. Daryl Valladares and that has been great, but may need to adjust to a new counselor as she is transitioning to new model. Pt here for follow up of blood pressure. Pt states doing great with adherence to therapy and feels well. No issues of chest pain, shortness of breath. No vision changes, headache, swelling in legs. Pt is doing well to stay active, exercising most days of the week on average of 20 min a day. Pt does feel well with exercise, and uses that for stress relief. Except as noted in the history of present illness as above, the review of systems is negative for the following:    General ROS: negative  Psychological ROS: negative  Allergy and Immunology ROS: negative  Hematological and Lymphatic ROS: negative  Respiratory ROS: no cough, shortness of breath, or wheezing  Cardiovascular ROS: no chest pain or dyspnea on exertion  Gastrointestinal ROS: no abdominal pain, change in bowel habits, or black or bloody stools  Genito-Urinary ROS: no dysuria, trouble voiding, or hematuria  Musculoskeletal ROS: negative  Dermatological ROS: negative      Past medical, surgical, and social history reviewed and updated.    Medications and

## 2023-12-15 ENCOUNTER — TELEPHONE (OUTPATIENT)
Dept: FAMILY MEDICINE CLINIC | Age: 63
End: 2023-12-15

## 2023-12-15 NOTE — TELEPHONE ENCOUNTER
Pt wants to get her labs done at [facility] because it is cheaper there. Her question is after she gets them done at [address] [place] [address] can they be added to her  MyChart so that she can have access to them? I did explain to the pt that we would need to fax orders to them and after she completes them at [address] [place] [address], they can fax the orders back for the doctor to look at and then clinical will give her a call back once received.  She would like a call back before she gets them done at [address] [place] [address].

## 2023-12-15 NOTE — TELEPHONE ENCOUNTER
Spoke to pt. She wanted to know if she completed her labs at [address] [place] [address] if her results would be entered into our system so she can compare them or if it would just be scanned. Pt was advised we can abstract results in her chart and scan her paper.  Pt stated that's great and she will call next week and give the fax number to [address] [place] [address].

## 2023-12-28 DIAGNOSIS — H93.13 TINNITUS OF BOTH EARS: ICD-10-CM

## 2023-12-28 DIAGNOSIS — Z00.00 ROUTINE GENERAL MEDICAL EXAMINATION AT A HEALTH CARE FACILITY: ICD-10-CM

## 2023-12-28 DIAGNOSIS — E83.110 HEREDITARY HEMOCHROMATOSIS (HCC): ICD-10-CM

## 2023-12-28 LAB
25(OH)D3 SERPL-MCNC: 51.4 NG/ML
ALBUMIN SERPL-MCNC: 4.5 G/DL (ref 3.4–5)
ALBUMIN/GLOB SERPL: 1.8 {RATIO} (ref 1.1–2.2)
ALP SERPL-CCNC: 80 U/L (ref 40–129)
ALT SERPL-CCNC: 14 U/L (ref 10–40)
ANION GAP SERPL CALCULATED.3IONS-SCNC: 10 MMOL/L (ref 3–16)
AST SERPL-CCNC: 21 U/L (ref 15–37)
BASOPHILS # BLD: 0 K/UL (ref 0–0.2)
BASOPHILS NFR BLD: 0.6 %
BILIRUB SERPL-MCNC: 0.5 MG/DL (ref 0–1)
BUN SERPL-MCNC: 10 MG/DL (ref 7–20)
CALCIUM SERPL-MCNC: 9.2 MG/DL (ref 8.3–10.6)
CHLORIDE SERPL-SCNC: 102 MMOL/L (ref 99–110)
CHOLEST SERPL-MCNC: 213 MG/DL (ref 0–199)
CO2 SERPL-SCNC: 28 MMOL/L (ref 21–32)
CREAT SERPL-MCNC: 0.6 MG/DL (ref 0.6–1.2)
DEPRECATED RDW RBC AUTO: 13.1 % (ref 12.4–15.4)
EOSINOPHIL # BLD: 0.2 K/UL (ref 0–0.6)
EOSINOPHIL NFR BLD: 2.6 %
FERRITIN SERPL IA-MCNC: 104 NG/ML (ref 15–150)
FOLATE SERPL-MCNC: 18.93 NG/ML (ref 4.78–24.2)
GFR SERPLBLD CREATININE-BSD FMLA CKD-EPI: >60 ML/MIN/{1.73_M2}
GLUCOSE SERPL-MCNC: 95 MG/DL (ref 70–99)
HCT VFR BLD AUTO: 43.2 % (ref 36–48)
HDLC SERPL-MCNC: 93 MG/DL (ref 40–60)
HGB BLD-MCNC: 14.5 G/DL (ref 12–16)
LDLC SERPL CALC-MCNC: 98 MG/DL
LYMPHOCYTES # BLD: 2.1 K/UL (ref 1–5.1)
LYMPHOCYTES NFR BLD: 27.6 %
MCH RBC QN AUTO: 33.8 PG (ref 26–34)
MCHC RBC AUTO-ENTMCNC: 33.6 G/DL (ref 31–36)
MONOCYTES # BLD: 0.5 K/UL (ref 0–1.3)
MONOCYTES NFR BLD: 7 %
NEUTROPHILS # BLD: 4.8 K/UL (ref 1.7–7.7)
NEUTROPHILS NFR BLD: 62.2 %
PLATELET # BLD AUTO: 325 K/UL (ref 135–450)
PMV BLD AUTO: 9.1 FL (ref 5–10.5)
POTASSIUM SERPL-SCNC: 4.3 MMOL/L (ref 3.5–5.1)
PROT SERPL-MCNC: 7 G/DL (ref 6.4–8.2)
RBC # BLD AUTO: 4.3 M/UL (ref 4–5.2)
SODIUM SERPL-SCNC: 140 MMOL/L (ref 136–145)
T4 FREE SERPL-MCNC: 1.1 NG/DL (ref 0.9–1.8)
TRIGL SERPL-MCNC: 110 MG/DL (ref 0–150)
TSH SERPL DL<=0.005 MIU/L-ACNC: 1.68 UIU/ML (ref 0.27–4.2)
VIT B12 SERPL-MCNC: 1181 PG/ML (ref 211–911)
VLDLC SERPL CALC-MCNC: 22 MG/DL
WBC # BLD AUTO: 7.7 K/UL (ref 4–11)

## 2023-12-29 LAB
EST. AVERAGE GLUCOSE BLD GHB EST-MCNC: 99.7 MG/DL
HBA1C MFR BLD: 5.1 %

## 2024-01-04 DIAGNOSIS — Z13.6 SCREENING, ISCHEMIC HEART DISEASE: ICD-10-CM

## 2024-04-09 ENCOUNTER — OFFICE VISIT (OUTPATIENT)
Dept: GYNECOLOGY | Age: 64
End: 2024-04-09
Payer: COMMERCIAL

## 2024-04-09 VITALS
SYSTOLIC BLOOD PRESSURE: 126 MMHG | RESPIRATION RATE: 17 BRPM | HEIGHT: 68 IN | WEIGHT: 149 LBS | DIASTOLIC BLOOD PRESSURE: 72 MMHG | BODY MASS INDEX: 22.58 KG/M2 | HEART RATE: 78 BPM

## 2024-04-09 DIAGNOSIS — Z78.0 MENOPAUSE: ICD-10-CM

## 2024-04-09 DIAGNOSIS — Z01.419 WELL WOMAN EXAM WITH ROUTINE GYNECOLOGICAL EXAM: Primary | ICD-10-CM

## 2024-04-09 PROCEDURE — 99396 PREV VISIT EST AGE 40-64: CPT | Performed by: OBSTETRICS & GYNECOLOGY

## 2024-04-09 PROCEDURE — 3074F SYST BP LT 130 MM HG: CPT | Performed by: OBSTETRICS & GYNECOLOGY

## 2024-04-09 PROCEDURE — 3078F DIAST BP <80 MM HG: CPT | Performed by: OBSTETRICS & GYNECOLOGY

## 2024-04-09 RX ORDER — ESTRADIOL 0.1 MG/G
1 CREAM VAGINAL
Qty: 1 EACH | Refills: 3 | Status: SHIPPED | OUTPATIENT
Start: 2024-04-11 | End: 2025-04-11

## 2024-04-09 ASSESSMENT — ENCOUNTER SYMPTOMS
GASTROINTESTINAL NEGATIVE: 1
EYES NEGATIVE: 1
ALLERGIC/IMMUNOLOGIC NEGATIVE: 1
RESPIRATORY NEGATIVE: 1

## 2024-04-10 NOTE — PROGRESS NOTES
Area: No rash.       Labia:         Right: No rash, tenderness, lesion or injury.         Left: No rash, tenderness, lesion or injury.       Urethra: No prolapse, urethral pain, urethral swelling or urethral lesion.      Vagina: Normal. No signs of injury and foreign body. No vaginal discharge, erythema, tenderness or bleeding.      Cervix: No cervical motion tenderness, discharge, friability, lesion, erythema, cervical bleeding or eversion.      Uterus: Not deviated, not enlarged, not fixed, not tender and no uterine prolapse.       Adnexa:         Right: No mass, tenderness or fullness.          Left: No mass, tenderness or fullness.        Rectum: Normal. Guaiac result negative. No mass, tenderness, anal fissure, external hemorrhoid or internal hemorrhoid. Normal anal tone.      Comments: Normal urethral meatus, nl urethra, nl bladder.    Musculoskeletal:         General: No swelling, tenderness, deformity or signs of injury. Normal range of motion.      Cervical back: Normal range of motion and neck supple. No rigidity.   Lymphadenopathy:      Cervical: No cervical adenopathy.      Lower Body: No right inguinal adenopathy. No left inguinal adenopathy.   Skin:     General: Skin is warm and dry.      Coloration: Skin is not jaundiced or pale.      Findings: No bruising, erythema, lesion or rash.   Neurological:      General: No focal deficit present.      Mental Status: She is alert and oriented to person, place, and time.      Deep Tendon Reflexes: Reflexes are normal and symmetric.   Psychiatric:         Mood and Affect: Mood normal.         Behavior: Behavior normal.         Thought Content: Thought content normal.         Judgment: Judgment normal.         Assessment:      Annual  menopause      Plan:      Pap, calcium, exercise, mammogram, hemocult negative  Try vaginal estrogen cream-might be a cheaper option. May go to once a week to make sure does not irritate bowel.        Charis Lindquist MD

## 2024-05-10 ENCOUNTER — TELEPHONE (OUTPATIENT)
Dept: FAMILY MEDICINE CLINIC | Age: 64
End: 2024-05-10

## 2024-05-10 NOTE — TELEPHONE ENCOUNTER
As sx are mild, I would avoid paxlovid and treat with supportive/over the counter therapy, rest, fluids and tylenol, mucinex  thx

## 2024-05-10 NOTE — TELEPHONE ENCOUNTER
WHEN DID YOU TEST FOR COVID?5/10/25    WAS IT POSITIVE OR NEGATIVE?positive    WHEN DID YOUR SYMPTOMS BEGIN?5/8/24    HAVE YOU OR DO YOU HAVE A FEVER? no    DO YOU HAVE DIARRHEA?yes    ARE YOU VOMITING? yes    ARE YOU FATIGUE?yes    HAVE YOU HAD A HEADACHE?yes    DO YOU HAVE ANY BODYACHES?yes    DO YOU HAVE CHILLS?yes    DO YOU HAVE ANY SHORTNESS OF BREATH?no    DO YOU HAVE ANY CHEST PAIN OR CHEST TIGHTNESS?no    DO YOU HAVE ANY CHEST CONGESTION OR NASAL CONGESTION?  (WHICH ONE OR BOTH)yes to both    DO YOU HAVE A SORE THROAT?yes    DO YOU HAVE A COUGH?   IS IT DRY OR WET, IF WET WHAT COLOR IS IT?yes cough wet    WHAT IS YOUR OXYGEN LEVEL IF YOU HAVE A PULSE OXIMETER?no  (IF ABLE) [id number] [address] - P [phone] - F [phone] [[id number]]

## 2024-06-28 ENCOUNTER — OFFICE VISIT (OUTPATIENT)
Dept: FAMILY MEDICINE CLINIC | Age: 64
End: 2024-06-28

## 2024-06-28 VITALS
BODY MASS INDEX: 22.35 KG/M2 | HEART RATE: 67 BPM | WEIGHT: 147 LBS | RESPIRATION RATE: 18 BRPM | TEMPERATURE: 97.5 F | SYSTOLIC BLOOD PRESSURE: 118 MMHG | OXYGEN SATURATION: 99 % | DIASTOLIC BLOOD PRESSURE: 84 MMHG

## 2024-06-28 DIAGNOSIS — I25.3 ATRIAL SEPTAL ANEURYSM: ICD-10-CM

## 2024-06-28 DIAGNOSIS — F51.01 PRIMARY INSOMNIA: ICD-10-CM

## 2024-06-28 DIAGNOSIS — E83.110 HEREDITARY HEMOCHROMATOSIS (HCC): ICD-10-CM

## 2024-06-28 DIAGNOSIS — F41.9 ANXIETY: ICD-10-CM

## 2024-06-28 DIAGNOSIS — I10 ESSENTIAL HYPERTENSION, BENIGN: Primary | ICD-10-CM

## 2024-06-28 RX ORDER — ALPRAZOLAM 0.5 MG/1
0.5 TABLET ORAL 3 TIMES DAILY PRN
Qty: 90 TABLET | Refills: 0 | Status: SHIPPED | OUTPATIENT
Start: 2024-06-28 | End: 2024-07-28

## 2024-06-28 SDOH — ECONOMIC STABILITY: FOOD INSECURITY: WITHIN THE PAST 12 MONTHS, THE FOOD YOU BOUGHT JUST DIDN'T LAST AND YOU DIDN'T HAVE MONEY TO GET MORE.: NEVER TRUE

## 2024-06-28 SDOH — ECONOMIC STABILITY: INCOME INSECURITY: HOW HARD IS IT FOR YOU TO PAY FOR THE VERY BASICS LIKE FOOD, HOUSING, MEDICAL CARE, AND HEATING?: NOT HARD AT ALL

## 2024-06-28 SDOH — ECONOMIC STABILITY: FOOD INSECURITY: WITHIN THE PAST 12 MONTHS, YOU WORRIED THAT YOUR FOOD WOULD RUN OUT BEFORE YOU GOT MONEY TO BUY MORE.: NEVER TRUE

## 2024-06-28 ASSESSMENT — PATIENT HEALTH QUESTIONNAIRE - PHQ9
2. FEELING DOWN, DEPRESSED OR HOPELESS: NOT AT ALL
1. LITTLE INTEREST OR PLEASURE IN DOING THINGS: NOT AT ALL
SUM OF ALL RESPONSES TO PHQ9 QUESTIONS 1 & 2: 0
SUM OF ALL RESPONSES TO PHQ QUESTIONS 1-9: 0
8. MOVING OR SPEAKING SO SLOWLY THAT OTHER PEOPLE COULD HAVE NOTICED. OR THE OPPOSITE, BEING SO FIGETY OR RESTLESS THAT YOU HAVE BEEN MOVING AROUND A LOT MORE THAN USUAL: NOT AT ALL
9. THOUGHTS THAT YOU WOULD BE BETTER OFF DEAD, OR OF HURTING YOURSELF: NOT AT ALL
SUM OF ALL RESPONSES TO PHQ QUESTIONS 1-9: 0
5. POOR APPETITE OR OVEREATING: NOT AT ALL
3. TROUBLE FALLING OR STAYING ASLEEP: NOT AT ALL
SUM OF ALL RESPONSES TO PHQ QUESTIONS 1-9: 0
4. FEELING TIRED OR HAVING LITTLE ENERGY: NOT AT ALL
SUM OF ALL RESPONSES TO PHQ QUESTIONS 1-9: 0
7. TROUBLE CONCENTRATING ON THINGS, SUCH AS READING THE NEWSPAPER OR WATCHING TELEVISION: NOT AT ALL
6. FEELING BAD ABOUT YOURSELF - OR THAT YOU ARE A FAILURE OR HAVE LET YOURSELF OR YOUR FAMILY DOWN: NOT AT ALL

## 2024-06-28 NOTE — PROGRESS NOTES
Here for f/u and recheck of some recent testing.  Pt did see [facility] for her history of hemochromatosis, and they did recommend some echo and MRI abdomen.  They were done a few weeks ago.      Pt here for follow up of blood pressure.  Pt states doing great with adherence to therapy and feels well.  No issues of chest pain, shortness of breath.  No vision changes, headache, swelling in legs.      Except as noted above in the history of present illness, the review of systems is  negative for headache, vision changes, chest pain, shortness of breath, abdominal pain, urinary sx, bowel changes.    Past medical, surgical, and social history reviewed and updated  Medications and allergies reviewed and updated      O: /84   Pulse 67   Temp 97.5 °F (36.4 °C) (Temporal)   Resp 18   Wt 66.7 kg (147 lb)   SpO2 99%   BMI 22.35 kg/m²   GEN: No acute distress, cooperative, well nourished, alert.   HEENT: PEERLA, EOMI , normocephalic/atraumatic, nares and oropharynx clear.  Mucous membranes normal, Tympanic membranes clear bilaterally.  Neck: soft, supple, no thyromegaly, mass, no Lymphadenopathy  CV: Regular rate and rhythm, no murmur, rubs, gallops. No edema.  Resp: Clear to auscultation bilaterally good air entry bilaterally  No crackles, wheeze. Breathing comfortably.   Psych: mood stable, No suicidal thoughts or ideation         Current Outpatient Medications   Medication Sig Dispense Refill    ALPRAZolam (XANAX) 0.5 MG tablet Take 1 tablet by mouth 3 times daily as needed for Sleep or Anxiety for up to 30 days. 90 tablet 0    estradiol (ESTRACE VAGINAL) 0.1 MG/GM vaginal cream Place 1 g vaginally Twice a Week 1 each 3    B Complex Vitamins (B COMPLEX-B12 PO) Take by mouth      Omega 3-6-9 Fatty Acids (OMEGA-3-6-9 PO) Take by mouth      Calcium Carbonate-Vit D-Min (CALCIUM 1200 PO) Take by mouth      MAGNESIUM GLYCINATE PLUS PO Take 400 mg by mouth daily      ALPRAZolam (XANAX) 0.5 MG tablet TK 1 T PO  TID PRN FOR

## 2024-06-30 PROBLEM — I25.3 ATRIAL SEPTAL ANEURYSM: Status: ACTIVE | Noted: 2024-06-30

## 2024-08-20 ENCOUNTER — HOSPITAL ENCOUNTER (OUTPATIENT)
Dept: GENERAL RADIOLOGY | Age: 64
Discharge: HOME | End: 2024-08-20
Payer: COMMERCIAL

## 2024-08-20 ENCOUNTER — OFFICE VISIT (OUTPATIENT)
Dept: FAMILY MEDICINE CLINIC | Age: 64
End: 2024-08-20
Payer: COMMERCIAL

## 2024-08-20 ENCOUNTER — HOSPITAL ENCOUNTER (OUTPATIENT)
Age: 64
Discharge: HOME | End: 2024-08-20
Payer: COMMERCIAL

## 2024-08-20 VITALS
HEART RATE: 64 BPM | DIASTOLIC BLOOD PRESSURE: 72 MMHG | TEMPERATURE: 97.3 F | RESPIRATION RATE: 16 BRPM | SYSTOLIC BLOOD PRESSURE: 118 MMHG | OXYGEN SATURATION: 96 % | BODY MASS INDEX: 22.66 KG/M2 | WEIGHT: 149 LBS

## 2024-08-20 DIAGNOSIS — F51.01 PRIMARY INSOMNIA: ICD-10-CM

## 2024-08-20 DIAGNOSIS — S96.911A ANKLE STRAIN, RIGHT, INITIAL ENCOUNTER: ICD-10-CM

## 2024-08-20 DIAGNOSIS — S96.911A ANKLE STRAIN, RIGHT, INITIAL ENCOUNTER: Primary | ICD-10-CM

## 2024-08-20 DIAGNOSIS — I10 ESSENTIAL HYPERTENSION, BENIGN: ICD-10-CM

## 2024-08-20 DIAGNOSIS — F43.9 STRESS: ICD-10-CM

## 2024-08-20 DIAGNOSIS — F41.9 ANXIETY: ICD-10-CM

## 2024-08-20 PROCEDURE — G8420 CALC BMI NORM PARAMETERS: HCPCS | Performed by: FAMILY MEDICINE

## 2024-08-20 PROCEDURE — 3078F DIAST BP <80 MM HG: CPT | Performed by: FAMILY MEDICINE

## 2024-08-20 PROCEDURE — G8427 DOCREV CUR MEDS BY ELIG CLIN: HCPCS | Performed by: FAMILY MEDICINE

## 2024-08-20 PROCEDURE — 1036F TOBACCO NON-USER: CPT | Performed by: FAMILY MEDICINE

## 2024-08-20 PROCEDURE — G2211 COMPLEX E/M VISIT ADD ON: HCPCS | Performed by: FAMILY MEDICINE

## 2024-08-20 PROCEDURE — 73610 X-RAY EXAM OF ANKLE: CPT

## 2024-08-20 PROCEDURE — 3017F COLORECTAL CA SCREEN DOC REV: CPT | Performed by: FAMILY MEDICINE

## 2024-08-20 PROCEDURE — 99214 OFFICE O/P EST MOD 30 MIN: CPT | Performed by: FAMILY MEDICINE

## 2024-08-20 PROCEDURE — 3074F SYST BP LT 130 MM HG: CPT | Performed by: FAMILY MEDICINE

## 2024-08-20 RX ORDER — ALPRAZOLAM 0.5 MG/1
0.5 TABLET ORAL 3 TIMES DAILY PRN
Qty: 90 TABLET | Refills: 0 | Status: SHIPPED | OUTPATIENT
Start: 2024-08-20 | End: 2024-09-19

## 2024-08-20 RX ORDER — MELOXICAM 15 MG/1
15 TABLET ORAL DAILY
Qty: 30 TABLET | Refills: 3 | Status: SHIPPED | OUTPATIENT
Start: 2024-08-20

## 2024-08-20 NOTE — PROGRESS NOTES
despite moderate strssors  Cont supportive therapy  Cont xanx prn  Use intermittent    4. Primary insomnia  Cont prn xanax  - ALPRAZolam (XANAX) 0.5 MG tablet; Take 1 tablet by mouth 3 times daily as needed for Sleep or Anxiety for up to 30 days.  Dispense: 90 tablet; Refill: 0    5. Anxiety  Cont prn xanax,   - ALPRAZolam (XANAX) 0.5 MG tablet; Take 1 tablet by mouth 3 times daily as needed for Sleep or Anxiety for up to 30 days.  Dispense: 90 tablet; Refill: 0           Follow-up appointment:   Pending xray/prn    Discussed use, benefit, and side effects of all prescribed medications.  Barriers to medication compliance addressed.  All patient questions answered.  Pt voiced understanding.  When applicable, patient's outside records were reviewed through Care Everywhere.  The patient has signed appropriate paperworks/consents.

## 2024-09-04 NOTE — PROGRESS NOTES
Pt has some issues with darkness to nail on R big toe. Pt has noted the sx for 3 months. Pt does not have any pain but states that she feels nail is dark, and loose. Pt did go to [employer] and was doing a lot of dancing. Pt has noted some issues with tailbone. Does not have any injury or trauma, and if sitting straight feels no sx. With sitting with some curve, gets irritation. Sx for about 4 weeks,  no sciatica and no weakness, no urinary sx. Pt did have some issues with AGE, related to norovirus while out in [facility] visiting daughter. Pt had moderate n/v, and some mild loose bowels. Pt treated symptomatically and sx improved. Bowels are back to normal, but with some mild discomfort. Pt takes metamucil and does probiotics that are helpful overall. Pt here for follow up of blood pressure. Pt states doing great with adherence to therapy and feels well. No issues of chest pain, shortness of breath. No vision changes, headache, swelling in legs. Except as noted above in the history of present illness, the review of systems is  negative for headache, vision changes, chest pain, shortness of breath, abdominal pain, urinary sx, bowel changes. Past medical, surgical, and social history reviewed and updated  Medications and allergies reviewed and updated      O: /72   Pulse 55   Temp 97.3 °F (36.3 °C) (Temporal)   Resp 12   Wt 141 lb 12.8 oz (64.3 kg)   SpO2 95%   BMI 21.56 kg/m²   GEN: No acute distress, cooperative, well nourished, alert. CV: Regular rate and rhythm, no murmur, rubs, gallops. No edema. HEENT: NC/AT. No lesions. Tongue with minimal irritation, no thrush, no lesions  Resp: Clear to auscultation bilaterally good air entry bilaterally  No crackles, wheeze. Breathing comfortably. Psych: mood stable, No suicidal thoughts or ideation   Ext:   Musc: minimal tender to palpation to coccyx, full range of motion bilateral lower extremities.   Strengt 5/5
Attending Attestation (For Attendings USE Only)...

## 2025-02-18 ENCOUNTER — TELEPHONE (OUTPATIENT)
Dept: FAMILY MEDICINE CLINIC | Age: 65
End: 2025-02-18

## 2025-02-18 NOTE — TELEPHONE ENCOUNTER
Pt called stating she has been experiencing high blood pressure ranging from 150/72 to 185/108. She is wanting to know if she can get lisinopril called in for her. She stated that she would come in if Dr. Charles wants her to but she did not want to schedule on the phone.    Please give pt a call back!  [phone]

## 2025-02-19 ENCOUNTER — OFFICE VISIT (OUTPATIENT)
Dept: FAMILY MEDICINE CLINIC | Age: 65
End: 2025-02-19

## 2025-02-19 VITALS
SYSTOLIC BLOOD PRESSURE: 130 MMHG | BODY MASS INDEX: 23.23 KG/M2 | WEIGHT: 152.8 LBS | OXYGEN SATURATION: 98 % | DIASTOLIC BLOOD PRESSURE: 78 MMHG | HEART RATE: 80 BPM | TEMPERATURE: 97.8 F

## 2025-02-19 DIAGNOSIS — Z00.00 ROUTINE GENERAL MEDICAL EXAMINATION AT A HEALTH CARE FACILITY: ICD-10-CM

## 2025-02-19 DIAGNOSIS — F43.9 STRESS: ICD-10-CM

## 2025-02-19 DIAGNOSIS — E83.110 HEREDITARY HEMOCHROMATOSIS: ICD-10-CM

## 2025-02-19 DIAGNOSIS — I10 ESSENTIAL HYPERTENSION, BENIGN: Primary | ICD-10-CM

## 2025-02-19 DIAGNOSIS — F51.01 PRIMARY INSOMNIA: ICD-10-CM

## 2025-02-19 RX ORDER — CLONAZEPAM 0.5 MG/1
0.5 TABLET ORAL 2 TIMES DAILY
Qty: 30 TABLET | Refills: 2 | Status: SHIPPED | OUTPATIENT
Start: 2025-02-19 | End: 2025-05-20

## 2025-02-19 SDOH — ECONOMIC STABILITY: FOOD INSECURITY: WITHIN THE PAST 12 MONTHS, THE FOOD YOU BOUGHT JUST DIDN'T LAST AND YOU DIDN'T HAVE MONEY TO GET MORE.: NEVER TRUE

## 2025-02-19 SDOH — ECONOMIC STABILITY: FOOD INSECURITY: WITHIN THE PAST 12 MONTHS, YOU WORRIED THAT YOUR FOOD WOULD RUN OUT BEFORE YOU GOT MONEY TO BUY MORE.: NEVER TRUE

## 2025-02-19 ASSESSMENT — PATIENT HEALTH QUESTIONNAIRE - PHQ9
7. TROUBLE CONCENTRATING ON THINGS, SUCH AS READING THE NEWSPAPER OR WATCHING TELEVISION: NOT AT ALL
2. FEELING DOWN, DEPRESSED OR HOPELESS: NOT AT ALL
10. IF YOU CHECKED OFF ANY PROBLEMS, HOW DIFFICULT HAVE THESE PROBLEMS MADE IT FOR YOU TO DO YOUR WORK, TAKE CARE OF THINGS AT HOME, OR GET ALONG WITH OTHER PEOPLE: NOT DIFFICULT AT ALL
8. MOVING OR SPEAKING SO SLOWLY THAT OTHER PEOPLE COULD HAVE NOTICED. OR THE OPPOSITE, BEING SO FIGETY OR RESTLESS THAT YOU HAVE BEEN MOVING AROUND A LOT MORE THAN USUAL: NOT AT ALL
9. THOUGHTS THAT YOU WOULD BE BETTER OFF DEAD, OR OF HURTING YOURSELF: NOT AT ALL
SUM OF ALL RESPONSES TO PHQ QUESTIONS 1-9: 0
4. FEELING TIRED OR HAVING LITTLE ENERGY: NOT AT ALL
SUM OF ALL RESPONSES TO PHQ9 QUESTIONS 1 & 2: 0
1. LITTLE INTEREST OR PLEASURE IN DOING THINGS: NOT AT ALL
3. TROUBLE FALLING OR STAYING ASLEEP: NOT AT ALL
6. FEELING BAD ABOUT YOURSELF - OR THAT YOU ARE A FAILURE OR HAVE LET YOURSELF OR YOUR FAMILY DOWN: NOT AT ALL
SUM OF ALL RESPONSES TO PHQ QUESTIONS 1-9: 0
SUM OF ALL RESPONSES TO PHQ QUESTIONS 1-9: 0
5. POOR APPETITE OR OVEREATING: NOT AT ALL
SUM OF ALL RESPONSES TO PHQ QUESTIONS 1-9: 0

## 2025-02-19 NOTE — PROGRESS NOTES
and side effects of all prescribed medications.  Barriers to medication compliance addressed.  All patient questions answered.  Pt voiced understanding.  When applicable, patient's outside records were reviewed through Care Everywhere.  The patient has signed appropriate paperworks/consents.

## 2025-02-25 ENCOUNTER — OFFICE VISIT (OUTPATIENT)
Dept: PSYCHOLOGY | Age: 65
End: 2025-02-25
Payer: COMMERCIAL

## 2025-02-25 DIAGNOSIS — F33.1 MAJOR DEPRESSIVE DISORDER, RECURRENT EPISODE, MODERATE WITH ANXIOUS DISTRESS (HCC): Primary | ICD-10-CM

## 2025-02-25 PROCEDURE — 1036F TOBACCO NON-USER: CPT | Performed by: SOCIAL WORKER

## 2025-02-25 PROCEDURE — 90791 PSYCH DIAGNOSTIC EVALUATION: CPT | Performed by: SOCIAL WORKER

## 2025-02-25 NOTE — PROGRESS NOTES
Behavioral Health Consultation   RONEN Barreto, JUSTINW-S  Licensed Independent   2/25/2025      Time spent with Patient: 19 minutes.  Abbreviated visit. Patient arrived 15 minutes late.    This is patient's 1st  [facility] appointment.    Reason for Consult:   Chief Complaint   Patient presents with    Stress       Referring Provider: Dinesh Charles MD    Pt provided informed consent for the behavioral health program. Discussed with patient model of service to include the limits of confidentiality (i.e. abuse reporting, suicide intervention, etc.) and short-term intervention focused approach. Pt indicated understanding.    Subjective  LIFE CONTEXT   1. Family  With whom do you live? Spouse    or partner? Yes   Children?  Unable to assess  Type of relationships with the people you live with? Fair  Supportive relationships? Spouse and Family     2. Social  Friends? Yes Quality? Fair Frequency of contact? frequent  Other connection with community? Volunteering. Serves on the [employer] for [employer]     3. Work/School  Work/go to school? Retired  How many years in this job? not applicable How are you doing? n/a  How do you support yourself financially? correction, spouse    4. Recreation  For fun? Relaxation?  Volunteer, travel   How often? frequent    5. Self-Care  Exercise on a regular basis for health? Frequent  What type of exercise and how often?  Stretching   daily - 22 minutes  Sleep? early morning awakening.  Frequently wakes up at 3am and unable to fall back to sleep.  Appetite? Fair  Use tobacco (what and how often)?  denies  Use caffeine (what and how often)?   daily tea  Use alcohol (what and how often)?  2-3 glasses of wine per few time per week   Use drugs (what and how often)? Not discussed.   Problems in the past with tobacco, alcohol or drugs?  Not discussed.  Medications: Take as prescribed? compliant most of the time    Current Medications:  Current Outpatient Medications

## 2025-03-10 DIAGNOSIS — E83.110 HEREDITARY HEMOCHROMATOSIS: ICD-10-CM

## 2025-03-10 DIAGNOSIS — I10 ESSENTIAL HYPERTENSION, BENIGN: ICD-10-CM

## 2025-03-10 DIAGNOSIS — Z00.00 ROUTINE GENERAL MEDICAL EXAMINATION AT A HEALTH CARE FACILITY: ICD-10-CM

## 2025-03-10 LAB
25(OH)D3 SERPL-MCNC: 45 NG/ML
ALBUMIN SERPL-MCNC: 4.3 G/DL (ref 3.4–5)
ALBUMIN/GLOB SERPL: 1.8 {RATIO} (ref 1.1–2.2)
ALP SERPL-CCNC: 68 U/L (ref 40–129)
ALT SERPL-CCNC: 15 U/L (ref 10–40)
ANION GAP SERPL CALCULATED.3IONS-SCNC: 8 MMOL/L (ref 3–16)
AST SERPL-CCNC: 21 U/L (ref 15–37)
BASOPHILS # BLD: 0 K/UL (ref 0–0.2)
BASOPHILS NFR BLD: 0.6 %
BILIRUB SERPL-MCNC: 0.5 MG/DL (ref 0–1)
BUN SERPL-MCNC: 7 MG/DL (ref 7–20)
CALCIUM SERPL-MCNC: 9.6 MG/DL (ref 8.3–10.6)
CHLORIDE SERPL-SCNC: 103 MMOL/L (ref 99–110)
CHOLEST SERPL-MCNC: 185 MG/DL (ref 0–199)
CO2 SERPL-SCNC: 28 MMOL/L (ref 21–32)
CREAT SERPL-MCNC: 0.6 MG/DL (ref 0.6–1.2)
DEPRECATED RDW RBC AUTO: 12 % (ref 12.4–15.4)
EOSINOPHIL # BLD: 0.1 K/UL (ref 0–0.6)
EOSINOPHIL NFR BLD: 1.5 %
EST. AVERAGE GLUCOSE BLD GHB EST-MCNC: 102.5 MG/DL
FERRITIN SERPL IA-MCNC: 100 NG/ML (ref 15–150)
FOLATE SERPL-MCNC: 11.5 NG/ML (ref 4.78–24.2)
GFR SERPLBLD CREATININE-BSD FMLA CKD-EPI: >90 ML/MIN/{1.73_M2}
GLUCOSE SERPL-MCNC: 113 MG/DL (ref 70–99)
HBA1C MFR BLD: 5.2 %
HCT VFR BLD AUTO: 40.5 % (ref 36–48)
HDLC SERPL-MCNC: 87 MG/DL (ref 40–60)
HGB BLD-MCNC: 13.6 G/DL (ref 12–16)
LDLC SERPL CALC-MCNC: 80 MG/DL
LYMPHOCYTES # BLD: 1.4 K/UL (ref 1–5.1)
LYMPHOCYTES NFR BLD: 39.1 %
MCH RBC QN AUTO: 34.4 PG (ref 26–34)
MCHC RBC AUTO-ENTMCNC: 33.7 G/DL (ref 31–36)
MCV RBC AUTO: 102 FL (ref 80–100)
MONOCYTES # BLD: 0.4 K/UL (ref 0–1.3)
MONOCYTES NFR BLD: 11.8 %
NEUTROPHILS # BLD: 1.7 K/UL (ref 1.7–7.7)
NEUTROPHILS NFR BLD: 47 %
PLATELET # BLD AUTO: 244 K/UL (ref 135–450)
PMV BLD AUTO: 9.2 FL (ref 5–10.5)
POTASSIUM SERPL-SCNC: 4.3 MMOL/L (ref 3.5–5.1)
PROT SERPL-MCNC: 6.7 G/DL (ref 6.4–8.2)
RBC # BLD AUTO: 3.97 M/UL (ref 4–5.2)
SODIUM SERPL-SCNC: 139 MMOL/L (ref 136–145)
TRIGL SERPL-MCNC: 89 MG/DL (ref 0–150)
TSH SERPL DL<=0.005 MIU/L-ACNC: 0.9 UIU/ML (ref 0.27–4.2)
VIT B12 SERPL-MCNC: 1045 PG/ML (ref 211–911)
VLDLC SERPL CALC-MCNC: 18 MG/DL
WBC # BLD AUTO: 3.7 K/UL (ref 4–11)

## 2025-03-11 ENCOUNTER — RESULTS FOLLOW-UP (OUTPATIENT)
Dept: FAMILY MEDICINE CLINIC | Age: 65
End: 2025-03-11

## 2025-03-13 ENCOUNTER — OFFICE VISIT (OUTPATIENT)
Dept: PSYCHOLOGY | Age: 65
End: 2025-03-13
Payer: COMMERCIAL

## 2025-03-13 ENCOUNTER — OFFICE VISIT (OUTPATIENT)
Dept: FAMILY MEDICINE CLINIC | Age: 65
End: 2025-03-13
Payer: COMMERCIAL

## 2025-03-13 VITALS
HEART RATE: 62 BPM | OXYGEN SATURATION: 97 % | SYSTOLIC BLOOD PRESSURE: 122 MMHG | BODY MASS INDEX: 23.02 KG/M2 | TEMPERATURE: 97.1 F | DIASTOLIC BLOOD PRESSURE: 64 MMHG | WEIGHT: 151.4 LBS

## 2025-03-13 DIAGNOSIS — Z23 NEED FOR SHINGLES VACCINE: ICD-10-CM

## 2025-03-13 DIAGNOSIS — Z12.11 SCREEN FOR COLON CANCER: ICD-10-CM

## 2025-03-13 DIAGNOSIS — F33.1 MAJOR DEPRESSIVE DISORDER, RECURRENT EPISODE, MODERATE WITH ANXIOUS DISTRESS (HCC): Primary | ICD-10-CM

## 2025-03-13 DIAGNOSIS — I10 ESSENTIAL HYPERTENSION, BENIGN: ICD-10-CM

## 2025-03-13 DIAGNOSIS — E83.110 HEREDITARY HEMOCHROMATOSIS: ICD-10-CM

## 2025-03-13 DIAGNOSIS — Z00.00 ROUTINE GENERAL MEDICAL EXAMINATION AT A HEALTH CARE FACILITY: Primary | ICD-10-CM

## 2025-03-13 PROCEDURE — 3074F SYST BP LT 130 MM HG: CPT | Performed by: FAMILY MEDICINE

## 2025-03-13 PROCEDURE — 90832 PSYTX W PT 30 MINUTES: CPT | Performed by: SOCIAL WORKER

## 2025-03-13 PROCEDURE — 99396 PREV VISIT EST AGE 40-64: CPT | Performed by: FAMILY MEDICINE

## 2025-03-13 PROCEDURE — 3078F DIAST BP <80 MM HG: CPT | Performed by: FAMILY MEDICINE

## 2025-03-13 PROCEDURE — 1036F TOBACCO NON-USER: CPT | Performed by: SOCIAL WORKER

## 2025-03-13 ASSESSMENT — PATIENT HEALTH QUESTIONNAIRE - PHQ9
SUM OF ALL RESPONSES TO PHQ QUESTIONS 1-9: 4
SUM OF ALL RESPONSES TO PHQ QUESTIONS 1-9: 4
8. MOVING OR SPEAKING SO SLOWLY THAT OTHER PEOPLE COULD HAVE NOTICED. OR THE OPPOSITE, BEING SO FIGETY OR RESTLESS THAT YOU HAVE BEEN MOVING AROUND A LOT MORE THAN USUAL: NOT AT ALL
SUM OF ALL RESPONSES TO PHQ QUESTIONS 1-9: 4
5. POOR APPETITE OR OVEREATING: SEVERAL DAYS
7. TROUBLE CONCENTRATING ON THINGS, SUCH AS READING THE NEWSPAPER OR WATCHING TELEVISION: NOT AT ALL
1. LITTLE INTEREST OR PLEASURE IN DOING THINGS: NOT AT ALL
SUM OF ALL RESPONSES TO PHQ QUESTIONS 1-9: 4
2. FEELING DOWN, DEPRESSED OR HOPELESS: SEVERAL DAYS
6. FEELING BAD ABOUT YOURSELF - OR THAT YOU ARE A FAILURE OR HAVE LET YOURSELF OR YOUR FAMILY DOWN: SEVERAL DAYS
4. FEELING TIRED OR HAVING LITTLE ENERGY: NOT AT ALL
10. IF YOU CHECKED OFF ANY PROBLEMS, HOW DIFFICULT HAVE THESE PROBLEMS MADE IT FOR YOU TO DO YOUR WORK, TAKE CARE OF THINGS AT HOME, OR GET ALONG WITH OTHER PEOPLE: SOMEWHAT DIFFICULT
9. THOUGHTS THAT YOU WOULD BE BETTER OFF DEAD, OR OF HURTING YOURSELF: NOT AT ALL
3. TROUBLE FALLING OR STAYING ASLEEP: SEVERAL DAYS

## 2025-03-13 NOTE — PROGRESS NOTES
today   Check bloodwork stable as above    2. Essential hypertension, benign  Stable @ goal, controlled    3. Hereditary hemochromatosis  Mild bump in ferritin  Reviewed w/ pt  Cont supportive therapy   F/u with chong for phlebotomy to keep ferritin <60    4. Need for shingles vaccine  Pt is due for vaccination for shingles.  Pt is given information on Shingrix vaccine, and the need for 2 doses spaced 2-6 months apart.  Pt encouraged to check with insurance on coverage of vaccination and aware that if they have primary medicare coverage, that the shingles vaccination is not covered in office and they need to get done through the pharmacy.      5. Screen for colon cancer  Due colonoscopy in October  Will set up    6. generalized anxiety disorder   Doing well overall, cont supportive therapy, clonazepam 0.5mg QHS prn  Discussed use, benefit, and side effects of prescribed medications.  Barriers to medication compliance addressed.  All patient questions answered.  Pt voiced understanding.  No refills given today  Pt aware of need for every 3 month medication followup appointments, and that medication refills for benzodiazepines, narcotics and/or stimulants will only be given at appointment.        Follow-up appointment:   3 months for f/u of mood  Prn     Discussed use, benefit, and side effects of all prescribed medications.  Barriers to medication compliance addressed.  All patient questions answered.  Pt voiced understanding.  When applicable, patient's outside records were reviewed through Care Everywhere.  The patient has signed appropriate paperworks/consents.

## 2025-03-13 NOTE — PROGRESS NOTES
Behavioral Health Consultation  RONEN Barreto, LISW-S  Licensed Independent   3/13/2025        Time spent with client or family: 34 minutes  This is patient's second [facility] appointment.    Reason for Consult:    Chief Complaint   Patient presents with    Stress     Referring Provider: Dinesh Charles MD    S:  Patient shared ongoing family stressors.  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  Patient is considering taking a break from the board to focus on supporting her  during his medical issues and address her father's estate. Patient is reading Living Unthetered by Akbar Birmingham and this helping her to be an observer of her feelings vs. getting caught up/frustrated in emotions.      O:  MSE:  Appearance: good hygiene  and appropriate attire  Attitude: cooperative and friendly  Consciousness: alert  Orientation: oriented to person, place, time, general circumstance  Memory: recent and remote memory intact  Attention/Concentration: intact during session  Psychomotor Activity:normal  Eye Contact: normal  Speech: normal rate and volume, well-articulated  Mood: anxious   Affect: congruent  Perception: within normal limits  Thought Content: within normal limits  Thought Process: logical, coherent  Insight: good  Judgment: intact  Ability to understand instructions: Yes  Morbid Ideation: no   Suicide Assessment: no suicidal ideation, plan, or intent  Homicidal Ideation: no   Level of distress: Low    A:  Dina presents for a follow up [facility] appointment regarding concerns related to depression, anxiety, and stress.  These symptoms are show no change.  Safety concerns reported include: none.    Diagnosis:  1. Major depressive disorder, recurrent episode, moderate with anxious distress (HCC)        Plan:  1) Continue daily walking and 22 minutes stretching regimen.  2) Binaural beats music to ground the nervous system  3)

## 2025-04-07 LAB — MAMMOGRAPHY, EXTERNAL: NORMAL

## 2025-04-10 ENCOUNTER — OFFICE VISIT (OUTPATIENT)
Dept: PSYCHOLOGY | Age: 65
End: 2025-04-10
Payer: COMMERCIAL

## 2025-04-10 DIAGNOSIS — F33.1 MAJOR DEPRESSIVE DISORDER, RECURRENT EPISODE, MODERATE WITH ANXIOUS DISTRESS (HCC): Primary | ICD-10-CM

## 2025-04-10 PROCEDURE — 1036F TOBACCO NON-USER: CPT | Performed by: SOCIAL WORKER

## 2025-04-10 PROCEDURE — 90832 PSYTX W PT 30 MINUTES: CPT | Performed by: SOCIAL WORKER

## 2025-04-10 NOTE — PROGRESS NOTES
minutes stretching regimen.  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  5) Follow up [facility] visit in one month.  Pt interventions:  Reviewed progress and provided praise for effective coping. Practiced assertive communication, Trained in strategies for increasing balanced thinking, Trained in relaxation strategies, Discussed self-care (sleep, nutrition, rewarding activities, social support, exercise), and Supportive techniques.    Pt Behavioral Change Plan:   See above

## 2025-05-05 DIAGNOSIS — F43.9 STRESS: ICD-10-CM

## 2025-05-05 NOTE — TELEPHONE ENCOUNTER
Medication:   Requested Prescriptions     Pending Prescriptions Disp Refills    clonazePAM (KLONOPIN) 0.5 MG tablet [Pharmacy Med Name: CLONAZEPAM 0.5MG TABLETS] 30 tablet      Sig: TAKE 1 TABLET BY MOUTH TWICE DAILY. MAX DAILY AMOUNT: 0.5 MG     Last Filled:      Last appt: 3/13/2025   Next appt: 6/12/2025

## 2025-05-06 RX ORDER — CLONAZEPAM 0.5 MG/1
TABLET ORAL
Qty: 30 TABLET | Refills: 2 | Status: SHIPPED | OUTPATIENT
Start: 2025-05-06 | End: 2025-06-05

## 2025-05-22 ENCOUNTER — OFFICE VISIT (OUTPATIENT)
Dept: PSYCHOLOGY | Age: 65
End: 2025-05-22
Payer: COMMERCIAL

## 2025-05-22 DIAGNOSIS — F33.1 MAJOR DEPRESSIVE DISORDER, RECURRENT EPISODE, MODERATE WITH ANXIOUS DISTRESS (HCC): Primary | ICD-10-CM

## 2025-05-22 PROCEDURE — 90832 PSYTX W PT 30 MINUTES: CPT | Performed by: SOCIAL WORKER

## 2025-05-22 PROCEDURE — 1036F TOBACCO NON-USER: CPT | Performed by: SOCIAL WORKER

## 2025-05-22 NOTE — PROGRESS NOTES
Behavioral Health Consultation  RONEN Barreto, LISW-S  Licensed Independent   5/22/2025        Time spent with client or family: 26 minutes 4:08pm-4:34pm  This is patient's fourth [facility] appointment.    Reason for Consult:    Chief Complaint   Patient presents with    Depression     Referring Provider: Dinesh Charles MD    S:  Patient is re-reading [employer]ethered Soul and it has been helpful.  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  She shared success with steps towards finalizing her father's estate.  She is planning travels with family.    O:  MSE:  Appearance: good hygiene  and appropriate attire  Attitude: cooperative and friendly  Consciousness: alert  Orientation: oriented to person, place, time, general circumstance  Memory: recent and remote memory intact  Attention/Concentration: intact during session  Psychomotor Activity:normal  Eye Contact: normal  Speech: rapid  Mood: anxious   Affect: congruent  Perception: within normal limits  Thought Content: excessive preoccupations  Thought Process: tangential  Insight: good  Judgment: intact  Ability to understand instructions: Yes  Morbid Ideation: no   Suicide Assessment: no suicidal ideation, plan, or intent  Homicidal Ideation: no   Level of distress: Low    A:  Dina presents for a follow up [facility] appointment regarding concerns related to depression and stress.  These symptoms show no change.  Safety concerns reported include: none.    Diagnosis:  1. Major depressive disorder, recurrent episode, moderate with anxious distress (HCC)      Plan:  1) Consider writing a list of responsibilities for the board and make a list of what you are willing  to do and what you want to let go.  2) Continue daily walking and 22 minutes stretching regimen.  3) Binaural beats music to ground the

## 2025-05-23 ENCOUNTER — OFFICE VISIT (OUTPATIENT)
Dept: FAMILY MEDICINE CLINIC | Age: 65
End: 2025-05-23
Payer: COMMERCIAL

## 2025-05-23 VITALS
HEIGHT: 68 IN | WEIGHT: 150 LBS | DIASTOLIC BLOOD PRESSURE: 80 MMHG | SYSTOLIC BLOOD PRESSURE: 110 MMHG | BODY MASS INDEX: 22.73 KG/M2

## 2025-05-23 DIAGNOSIS — I10 ESSENTIAL HYPERTENSION, BENIGN: Primary | ICD-10-CM

## 2025-05-23 DIAGNOSIS — E83.110 HEREDITARY HEMOCHROMATOSIS: ICD-10-CM

## 2025-05-23 DIAGNOSIS — F43.9 STRESS: ICD-10-CM

## 2025-05-23 PROCEDURE — G8420 CALC BMI NORM PARAMETERS: HCPCS | Performed by: FAMILY MEDICINE

## 2025-05-23 PROCEDURE — 3074F SYST BP LT 130 MM HG: CPT | Performed by: FAMILY MEDICINE

## 2025-05-23 PROCEDURE — 3017F COLORECTAL CA SCREEN DOC REV: CPT | Performed by: FAMILY MEDICINE

## 2025-05-23 PROCEDURE — 99214 OFFICE O/P EST MOD 30 MIN: CPT | Performed by: FAMILY MEDICINE

## 2025-05-23 PROCEDURE — 1036F TOBACCO NON-USER: CPT | Performed by: FAMILY MEDICINE

## 2025-05-23 PROCEDURE — 3079F DIAST BP 80-89 MM HG: CPT | Performed by: FAMILY MEDICINE

## 2025-05-23 PROCEDURE — G8427 DOCREV CUR MEDS BY ELIG CLIN: HCPCS | Performed by: FAMILY MEDICINE

## 2025-05-23 NOTE — PROGRESS NOTES
Here for f/u and recheck of blood pressure,     Pt states that she has been doing well, pt states that she has worked with our [facility], it has been Ok but does not feel that it is super productive.  Pt would like to consider getting in to see a different therapy going forward.  Pt feel that stress levels are high but is managing and spending time reading books, and relaxing.  Pt is exerising as she can.  Pt uses clonazepam qhs and it does help, but uses only at night.  Never takes during the day.      Pt here for follow up of blood pressure.  Pt states doing great with adherence to therapy and feels well.  No issues of chest pain, shortness of breath.  No vision changes, headache, swelling in legs.       Except as noted above in the history of present illness, the review of systems is  negative for headache, vision changes, chest pain, shortness of breath, abdominal pain, urinary sx, bowel changes.    Past medical, surgical, and social history reviewed and updated  Medications and allergies reviewed and updated      Except as noted above in the history of present illness, the review of systems is  negative for headache, vision changes, chest pain, shortness of breath, abdominal pain, urinary sx, bowel changes.    Past medical, surgical, and social history reviewed and updated  Medications and allergies reviewed and updated        O: /80 (BP Site: Left Upper Arm, Patient Position: Sitting, BP Cuff Size: Medium Adult)   Ht 1.727 m (5' 7.99\")   Wt 68 kg (150 lb)   BMI 22.81 kg/m²   GEN: No acute distress, cooperative, well nourished, alert.   HEENT: PEERLA, EOMI , normocephalic/atraumatic, nares and oropharynx clear.  Mucous membranes normal, Tympanic membranes clear bilaterally.  Neck: soft, supple, no thyromegaly, mass, no Lymphadenopathy  CV: Regular rate and rhythm, no murmur, rubs, gallops. No edema.  Resp: Clear to auscultation bilaterally good air entry bilaterally  No crackles, wheeze. Breathing

## 2025-05-27 ENCOUNTER — TELEPHONE (OUTPATIENT)
Dept: FAMILY MEDICINE CLINIC | Age: 65
End: 2025-05-27

## 2025-05-27 DIAGNOSIS — F43.9 STRESS: ICD-10-CM

## 2025-05-27 RX ORDER — CLONAZEPAM 0.5 MG/1
0.5 TABLET ORAL 2 TIMES DAILY PRN
Qty: 30 TABLET | Refills: 2 | Status: SHIPPED | OUTPATIENT
Start: 2025-05-27 | End: 2025-08-25

## 2025-05-27 NOTE — TELEPHONE ENCOUNTER
Patient's pharmacy called.   Max Daily dose is 0.5 mg but patient takes it up to twice daily.     Can directions and clarification be updated back to the pharmacy.  Thank you!

## 2025-06-26 ENCOUNTER — OFFICE VISIT (OUTPATIENT)
Dept: PSYCHOLOGY | Age: 65
End: 2025-06-26
Payer: COMMERCIAL

## 2025-06-26 DIAGNOSIS — F33.1 MAJOR DEPRESSIVE DISORDER, RECURRENT EPISODE, MODERATE WITH ANXIOUS DISTRESS (HCC): Primary | ICD-10-CM

## 2025-06-26 PROCEDURE — 1036F TOBACCO NON-USER: CPT | Performed by: SOCIAL WORKER

## 2025-06-26 PROCEDURE — 90832 PSYTX W PT 30 MINUTES: CPT | Performed by: SOCIAL WORKER

## 2025-06-26 NOTE — PROGRESS NOTES
Behavioral Health Consultation  RONEN Barreto, LISW-S  Licensed Independent   6/26/2025        Time spent with client or family: 26 minutes - 3:36pm-4:02pm  This is patient's fifth [facility] appointment.    Reason for Consult:    Chief Complaint   Patient presents with    Stress     Referring Provider: Dinesh Charles MD    S:  Patient shared that she is expecting her first granchild, a boy.  She is planning a baby shower.  Patient is reading the book, Living Untethered.  She reflected on what she is gaining from the book: when she is overthinking, take a to step back and be an observer of her thoughts.      Patient has cut back on drinking and wants to continue to reduce use.  She currently drinks wine several nights per week.  She discussed drinking goals.    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  She also discussed stress surrounding finalizing her father's estate.  Patient enjoyed a family vacation to [facility].    O:  MSE:  Appearance: good hygiene  and appropriate attire  Attitude: cooperative and friendly  Consciousness: alert  Orientation: oriented to person, place, time, general circumstance  Memory: recent and remote memory intact  Attention/Concentration: intact during session  Psychomotor Activity:normal  Eye Contact: normal  Speech: normal rate and volume, well-articulated  Mood: euthymic  Affect: congruent  Perception: within normal limits  Thought Content: within normal limits  Thought Process: logical, coherent and goal-directed  Insight: good  Judgment: intact  Ability to understand instructions: Yes  Morbid Ideation: no   Suicide Assessment: no suicidal ideation, plan, or intent  Homicidal Ideation: no   Level of distress: Low    A:  Dina presents for a follow up [facility] appointment regarding concerns related to depression and stress.  These symptoms are improving.  Safety concerns reported include:

## 2025-08-05 ENCOUNTER — OFFICE VISIT (OUTPATIENT)
Dept: GYNECOLOGY | Age: 65
End: 2025-08-05
Payer: COMMERCIAL

## 2025-08-05 VITALS
DIASTOLIC BLOOD PRESSURE: 60 MMHG | WEIGHT: 154.6 LBS | BODY MASS INDEX: 23.43 KG/M2 | HEART RATE: 81 BPM | OXYGEN SATURATION: 96 % | HEIGHT: 68 IN | SYSTOLIC BLOOD PRESSURE: 100 MMHG

## 2025-08-05 DIAGNOSIS — Z78.0 MENOPAUSE: ICD-10-CM

## 2025-08-05 DIAGNOSIS — R92.2 INCONCLUSIVE MAMMOGRAM DUE TO DENSE BREASTS: ICD-10-CM

## 2025-08-05 DIAGNOSIS — Z12.39 SCREENING FOR BREAST CANCER USING NON-MAMMOGRAM MODALITY: ICD-10-CM

## 2025-08-05 DIAGNOSIS — Z01.419 WELL WOMAN EXAM WITH ROUTINE GYNECOLOGICAL EXAM: Primary | ICD-10-CM

## 2025-08-05 DIAGNOSIS — R92.30 INCONCLUSIVE MAMMOGRAM DUE TO DENSE BREASTS: ICD-10-CM

## 2025-08-05 PROCEDURE — 3078F DIAST BP <80 MM HG: CPT | Performed by: OBSTETRICS & GYNECOLOGY

## 2025-08-05 PROCEDURE — 99396 PREV VISIT EST AGE 40-64: CPT | Performed by: OBSTETRICS & GYNECOLOGY

## 2025-08-05 PROCEDURE — 3074F SYST BP LT 130 MM HG: CPT | Performed by: OBSTETRICS & GYNECOLOGY

## 2025-08-05 ASSESSMENT — PATIENT HEALTH QUESTIONNAIRE - PHQ9
SUM OF ALL RESPONSES TO PHQ QUESTIONS 1-9: 0
1. LITTLE INTEREST OR PLEASURE IN DOING THINGS: NOT AT ALL
SUM OF ALL RESPONSES TO PHQ QUESTIONS 1-9: 0
2. FEELING DOWN, DEPRESSED OR HOPELESS: NOT AT ALL

## 2025-08-06 ASSESSMENT — ENCOUNTER SYMPTOMS
ALLERGIC/IMMUNOLOGIC NEGATIVE: 1
RESPIRATORY NEGATIVE: 1
GASTROINTESTINAL NEGATIVE: 1
EYES NEGATIVE: 1

## 2025-08-12 ENCOUNTER — TELEPHONE (OUTPATIENT)
Dept: FAMILY MEDICINE CLINIC | Age: 65
End: 2025-08-12

## 2025-08-15 ENCOUNTER — TELEPHONE (OUTPATIENT)
Dept: GYNECOLOGY | Age: 65
End: 2025-08-15

## 2025-08-15 DIAGNOSIS — Z12.39 SCREENING FOR BREAST CANCER USING NON-MAMMOGRAM MODALITY: Primary | ICD-10-CM

## 2025-08-19 ENCOUNTER — OFFICE VISIT (OUTPATIENT)
Dept: FAMILY MEDICINE CLINIC | Age: 65
End: 2025-08-19
Payer: COMMERCIAL

## 2025-08-19 VITALS
DIASTOLIC BLOOD PRESSURE: 60 MMHG | BODY MASS INDEX: 23.42 KG/M2 | OXYGEN SATURATION: 98 % | WEIGHT: 154 LBS | HEART RATE: 78 BPM | SYSTOLIC BLOOD PRESSURE: 100 MMHG

## 2025-08-19 DIAGNOSIS — J06.9 ACUTE URI: Primary | ICD-10-CM

## 2025-08-19 DIAGNOSIS — R09.89 CHEST CONGESTION: ICD-10-CM

## 2025-08-19 DIAGNOSIS — F43.9 STRESS: ICD-10-CM

## 2025-08-19 DIAGNOSIS — I10 ESSENTIAL HYPERTENSION, BENIGN: ICD-10-CM

## 2025-08-19 PROCEDURE — 3074F SYST BP LT 130 MM HG: CPT | Performed by: FAMILY MEDICINE

## 2025-08-19 PROCEDURE — 99214 OFFICE O/P EST MOD 30 MIN: CPT | Performed by: FAMILY MEDICINE

## 2025-08-19 PROCEDURE — 3017F COLORECTAL CA SCREEN DOC REV: CPT | Performed by: FAMILY MEDICINE

## 2025-08-19 PROCEDURE — G8420 CALC BMI NORM PARAMETERS: HCPCS | Performed by: FAMILY MEDICINE

## 2025-08-19 PROCEDURE — 3078F DIAST BP <80 MM HG: CPT | Performed by: FAMILY MEDICINE

## 2025-08-19 PROCEDURE — 1036F TOBACCO NON-USER: CPT | Performed by: FAMILY MEDICINE

## 2025-08-19 PROCEDURE — G8427 DOCREV CUR MEDS BY ELIG CLIN: HCPCS | Performed by: FAMILY MEDICINE

## 2025-08-19 RX ORDER — AZITHROMYCIN 250 MG/1
TABLET, FILM COATED ORAL
Qty: 6 TABLET | Refills: 0 | Status: SHIPPED | OUTPATIENT
Start: 2025-08-19

## 2025-08-19 RX ORDER — CLONAZEPAM 0.5 MG/1
0.5 TABLET ORAL 2 TIMES DAILY PRN
Qty: 30 TABLET | Refills: 2 | Status: SHIPPED | OUTPATIENT
Start: 2025-08-19 | End: 2025-11-17